# Patient Record
Sex: FEMALE | Race: WHITE | Employment: FULL TIME | URBAN - METROPOLITAN AREA
[De-identification: names, ages, dates, MRNs, and addresses within clinical notes are randomized per-mention and may not be internally consistent; named-entity substitution may affect disease eponyms.]

---

## 2020-08-19 ENCOUNTER — EMPLOYEE WELLNESS (OUTPATIENT)
Dept: OTHER | Age: 20
End: 2020-08-19

## 2020-08-19 LAB
CHOLESTEROL, TOTAL: 207 MG/DL (ref 0–199)
GLUCOSE BLD-MCNC: 70 MG/DL (ref 70–99)
HDLC SERPL-MCNC: 55 MG/DL (ref 40–60)
LDL CHOLESTEROL CALCULATED: 128 MG/DL
TRIGL SERPL-MCNC: 119 MG/DL (ref 0–150)

## 2020-10-19 VITALS — WEIGHT: 169 LBS

## 2022-01-04 ENCOUNTER — OFFICE VISIT (OUTPATIENT)
Dept: OBGYN CLINIC | Age: 22
End: 2022-01-04
Payer: COMMERCIAL

## 2022-01-04 VITALS
DIASTOLIC BLOOD PRESSURE: 70 MMHG | TEMPERATURE: 97.8 F | HEART RATE: 87 BPM | SYSTOLIC BLOOD PRESSURE: 110 MMHG | WEIGHT: 159.4 LBS

## 2022-01-04 DIAGNOSIS — B37.31 YEAST VAGINITIS: ICD-10-CM

## 2022-01-04 DIAGNOSIS — N91.2 AMENORRHEA: Primary | ICD-10-CM

## 2022-01-04 DIAGNOSIS — Z11.3 SCREENING EXAMINATION FOR STD (SEXUALLY TRANSMITTED DISEASE): ICD-10-CM

## 2022-01-04 DIAGNOSIS — Z32.01 POSITIVE PREGNANCY TEST: ICD-10-CM

## 2022-01-04 DIAGNOSIS — N91.2 AMENORRHEA: ICD-10-CM

## 2022-01-04 DIAGNOSIS — Z12.4 PAP SMEAR FOR CERVICAL CANCER SCREENING: ICD-10-CM

## 2022-01-04 DIAGNOSIS — Z01.419 WOMEN'S ANNUAL ROUTINE GYNECOLOGICAL EXAMINATION: Primary | ICD-10-CM

## 2022-01-04 LAB
CRL: NORMAL
SAC DIAMETER: NORMAL

## 2022-01-04 PROCEDURE — 76801 OB US < 14 WKS SINGLE FETUS: CPT | Performed by: OBSTETRICS & GYNECOLOGY

## 2022-01-04 PROCEDURE — 81025 URINE PREGNANCY TEST: CPT | Performed by: OBSTETRICS & GYNECOLOGY

## 2022-01-04 PROCEDURE — 99385 PREV VISIT NEW AGE 18-39: CPT | Performed by: OBSTETRICS & GYNECOLOGY

## 2022-01-04 RX ORDER — ONDANSETRON 4 MG/1
4 TABLET, ORALLY DISINTEGRATING ORAL EVERY 8 HOURS PRN
Qty: 20 TABLET | Refills: 1 | Status: ON HOLD
Start: 2022-01-04 | End: 2022-07-22 | Stop reason: HOSPADM

## 2022-01-04 RX ORDER — PRASTERONE (DHEA) 50 MG
CAPSULE ORAL
COMMUNITY
End: 2022-08-04

## 2022-01-04 RX ORDER — MULTIVIT-MIN/IRON/FOLIC ACID/K 18-600-40
CAPSULE ORAL
COMMUNITY
End: 2022-08-04

## 2022-01-04 RX ORDER — CHLORAL HYDRATE 500 MG
3000 CAPSULE ORAL 3 TIMES DAILY
Status: ON HOLD | COMMUNITY
End: 2022-07-22 | Stop reason: HOSPADM

## 2022-01-04 NOTE — PROGRESS NOTES
Patient called in to check if it is okay to get her booster for COVID and per Dr. Eugenia Garcia it is okay. Patient had COVID in Nov, 2021 and is almost in her 2nd trimester.

## 2022-01-05 LAB
BACTERIA: ABNORMAL /HPF
BILIRUBIN URINE: NEGATIVE
BLOOD, URINE: NEGATIVE
C TRACH DNA GENITAL QL NAA+PROBE: NEGATIVE
CANDIDA SPECIES, DNA PROBE: NORMAL
CLARITY: ABNORMAL
COLOR: YELLOW
COMMENT UA: ABNORMAL
EPITHELIAL CELLS, UA: 5 /HPF (ref 0–5)
GARDNERELLA VAGINALIS, DNA PROBE: NORMAL
GLUCOSE URINE: NEGATIVE MG/DL
HYALINE CASTS: 0 /LPF (ref 0–8)
KETONES, URINE: NEGATIVE MG/DL
LEUKOCYTE ESTERASE, URINE: NEGATIVE
MICROSCOPIC EXAMINATION: YES
N. GONORRHOEAE DNA: NEGATIVE
NITRITE, URINE: NEGATIVE
PH UA: 7.5 (ref 5–8)
PROTEIN UA: NEGATIVE MG/DL
RBC UA: ABNORMAL /HPF (ref 0–4)
SPECIFIC GRAVITY UA: 1.01 (ref 1–1.03)
TRICHOMONAS VAGINALIS DNA: NORMAL
URINE TYPE: ABNORMAL
UROBILINOGEN, URINE: 0.2 E.U./DL
WBC UA: 5 /HPF (ref 0–5)

## 2022-01-06 LAB — URINE CULTURE, ROUTINE: NORMAL

## 2022-01-06 NOTE — PROGRESS NOTES
Subjective:      Patient ID: Parvin Spence is a 19 y.o. female. HPI  20 y/o  female presents for well woman examination and evaluation secondary to amenorrhea/missed menses. No history of pap smear. Menarche occurred in 8th grade. Menses occur every month x 5 days, medium flow, painful in the beginning. FDLMP: 10/11/21. Denies vaginal bleeding and discharge since October. History is positive for small left ovarian cyst--diagnosed by prior provider. Has noted nausea and vomiting--resolved 1 week ago. Has required Zofran. Review of Systems   Constitutional: Negative for activity change, appetite change, chills, fatigue, fever and unexpected weight change. Respiratory: Negative for shortness of breath. Cardiovascular: Negative for chest pain. Gastrointestinal: Negative for abdominal distention, abdominal pain, constipation, diarrhea, nausea and vomiting. Endocrine: Negative for cold intolerance and heat intolerance. Genitourinary: Positive for menstrual problem and vaginal discharge. Negative for difficulty urinating, dyspareunia, dysuria, frequency, genital sores, hematuria, pelvic pain, vaginal bleeding and vaginal pain. Skin: Negative for rash. Neurological: Negative for headaches. Hematological: Does not bruise/bleed easily. Objective:   Physical Exam  Vitals and nursing note reviewed. Exam conducted with a chaperone present. Constitutional:       General: She is not in acute distress. Appearance: Normal appearance. She is well-developed. She is not ill-appearing or toxic-appearing. HENT:      Head: Normocephalic and atraumatic. Neck:      Thyroid: No thyromegaly. Trachea: Trachea normal.   Cardiovascular:      Rate and Rhythm: Normal rate and regular rhythm. Heart sounds: Normal heart sounds, S1 normal and S2 normal.   Pulmonary:      Effort: Pulmonary effort is normal. No respiratory distress. Breath sounds: Normal breath sounds.    Chest: x 1.86 cm. There is a single intrauterine pregnancy identified. A fetal pole is noted with a CRL measuring 5.85 cm, consistent with gestational age of 16 weeks and 2 days and EDC of 07/17/2022. There is a 1 day discrepancy when compared with the gestational age of 16 weeks and 1 day and ED[facility] 07[facility]). Yolk sac is absent. Fetal cardiac activity is present at 158 bpm.     IMPRESSION:    Single IUP with cardiac activity. Imaging is limited secondary to bowel gas. Patient is well aware of the limitations of ultrasound in the detection of anomalies. Assessment:       Diagnosis Orders   1. Women's annual routine gynecological examination  URINALYSIS WITH MICROSCOPIC    Culture, Urine    PAP SMEAR    VAGINAL PATHOGENS PROBE *A    C.trachomatis N.gonorrhoeae DNA   2. Pap smear for cervical cancer screening  PAP SMEAR   3. Positive pregnancy test  URINALYSIS WITH MICROSCOPIC    Culture, Urine    POCT urine pregnancy   4. Amenorrhea  URINALYSIS WITH MICROSCOPIC    Culture, Urine    VAGINAL PATHOGENS PROBE *A    C.trachomatis N.gonorrhoeae DNA   5. Yeast vaginitis  VAGINAL PATHOGENS PROBE *A   6. Screening examination for STD (sexually transmitted disease)  VAGINAL PATHOGENS PROBE *A    C.trachomatis N.gonorrhoeae DNA           Plan:      Orders Placed This Encounter   Procedures    Culture, Urine    C.trachomatis N.gonorrhoeae DNA    URINALYSIS WITH MICROSCOPIC    PAP SMEAR    VAGINAL PATHOGENS PROBE *A    PAP SMEAR    POCT urine pregnancy     Continue prenatal vitamin with folic acid. Ultrasound result reviewed. Options for treatment reviewed: NT, quad screen, free fetal DNA. Risks and benefits discussed. Follow up prn and 4 weeks for initial prenatal visit.    Rx terazole  Rx refill faviola Quintero DO

## 2022-01-09 ASSESSMENT — ENCOUNTER SYMPTOMS
DIARRHEA: 0
VOMITING: 0
ABDOMINAL PAIN: 0
NAUSEA: 0
SHORTNESS OF BREATH: 0
ABDOMINAL DISTENTION: 0
CONSTIPATION: 0

## 2022-01-27 ENCOUNTER — TELEPHONE (OUTPATIENT)
Dept: OBGYN CLINIC | Age: 22
End: 2022-01-27

## 2022-01-27 RX ORDER — ONDANSETRON 4 MG/1
4 TABLET, FILM COATED ORAL EVERY 8 HOURS PRN
Qty: 20 TABLET | Refills: 1 | Status: ON HOLD
Start: 2022-01-27 | End: 2022-07-22 | Stop reason: HOSPADM

## 2022-01-27 NOTE — TELEPHONE ENCOUNTER
Patient called and stated since she has started the zofran she is dealing with headaches, denies any blurred vision, or swelling. Is asking if there is another option besides the dissolvable tablet. stated the one she took before was a tablet she swallowed, and didn't seem to give her headaches. Stated she has used tylenol for the headaches and it does seem to help when they come on. Please advise.      Routing to Dr. Sofia Land

## 2022-01-31 ENCOUNTER — INITIAL PRENATAL (OUTPATIENT)
Dept: OBGYN CLINIC | Age: 22
End: 2022-01-31
Payer: COMMERCIAL

## 2022-01-31 VITALS — DIASTOLIC BLOOD PRESSURE: 60 MMHG | HEART RATE: 76 BPM | WEIGHT: 160 LBS | SYSTOLIC BLOOD PRESSURE: 100 MMHG

## 2022-01-31 DIAGNOSIS — O21.9 NAUSEA AND VOMITING IN PREGNANCY: ICD-10-CM

## 2022-01-31 DIAGNOSIS — Z34.02 PRIMIGRAVIDA IN SECOND TRIMESTER: Primary | ICD-10-CM

## 2022-01-31 LAB
AMPHETAMINE SCREEN, URINE: NORMAL
BARBITURATE SCREEN URINE: NORMAL
BASOPHILS ABSOLUTE: 0 K/UL (ref 0–0.2)
BASOPHILS RELATIVE PERCENT: 0.5 %
BENZODIAZEPINE SCREEN, URINE: NORMAL
BUPRENORPHINE URINE: NORMAL
CANNABINOID SCREEN URINE: NORMAL
COCAINE METABOLITE SCREEN URINE: NORMAL
EOSINOPHILS ABSOLUTE: 0.1 K/UL (ref 0–0.6)
EOSINOPHILS RELATIVE PERCENT: 0.8 %
HCT VFR BLD CALC: 36.5 % (ref 36–48)
HEMOGLOBIN: 12.4 G/DL (ref 12–16)
HEPATITIS B SURFACE ANTIGEN INTERPRETATION: NORMAL
LYMPHOCYTES ABSOLUTE: 1.6 K/UL (ref 1–5.1)
LYMPHOCYTES RELATIVE PERCENT: 22.8 %
Lab: NORMAL
MCH RBC QN AUTO: 32.5 PG (ref 26–34)
MCHC RBC AUTO-ENTMCNC: 34.1 G/DL (ref 31–36)
MCV RBC AUTO: 95.3 FL (ref 80–100)
METHADONE SCREEN, URINE: NORMAL
MONOCYTES ABSOLUTE: 0.5 K/UL (ref 0–1.3)
MONOCYTES RELATIVE PERCENT: 7.4 %
NEUTROPHILS ABSOLUTE: 4.9 K/UL (ref 1.7–7.7)
NEUTROPHILS RELATIVE PERCENT: 68.5 %
OPIATE SCREEN URINE: NORMAL
OXYCODONE URINE: NORMAL
PDW BLD-RTO: 13.9 % (ref 12.4–15.4)
PH UA: 6
PHENCYCLIDINE SCREEN URINE: NORMAL
PLATELET # BLD: 251 K/UL (ref 135–450)
PMV BLD AUTO: 8.7 FL (ref 5–10.5)
PROPOXYPHENE SCREEN: NORMAL
RBC # BLD: 3.83 M/UL (ref 4–5.2)
RUBELLA ANTIBODY IGG: 361 IU/ML
TSH REFLEX: 2.1 UIU/ML (ref 0.27–4.2)
WBC # BLD: 7.2 K/UL (ref 4–11)

## 2022-01-31 PROCEDURE — 36415 COLL VENOUS BLD VENIPUNCTURE: CPT | Performed by: OBSTETRICS & GYNECOLOGY

## 2022-01-31 PROCEDURE — 0500F INITIAL PRENATAL CARE VISIT: CPT | Performed by: OBSTETRICS & GYNECOLOGY

## 2022-01-31 NOTE — PROGRESS NOTES
Temp: 97.4  Maternal emotional well being screening form completed and reviewed with patient. Current score is 0. Patient given referral to [address] [place] [address] ([phone]): No    Patient presents to office for urine drug screen. Patient escorted to exam room, personal belongings left if room and door closed. Excorted patient to restroom. Clean catch urine collection explained. Patient notified to not flush toilet or wash hands and bring specimen out of restroom. Patient initialed ID labels and witnessed application of labels to specimen. Urine temperature read: 94.0 degree F. Patient permitted to return to restroom to flush toilet and wash hands. Escorted patient back to exam room. Kassie Rowe MA    Blood draw from L Cephalic x 1 attempt without difficulty. 3 SST, 1 PST, 2 LAV tubes drawn. Patient tolerated well.  Kassie Rowe MA

## 2022-02-01 LAB
ABO/RH: NORMAL
ANTIBODY SCREEN: NORMAL
HIV AG/AB: NORMAL
HIV ANTIGEN: NORMAL
HIV-1 ANTIBODY: NORMAL
HIV-2 AB: NORMAL
RPR: NORMAL
VARICELLA-ZOSTER VIRUS AB, IGG: NORMAL

## 2022-02-02 LAB
AFP INTERPRETATION: NORMAL
AFP MOM: 0.58
AFP SPECIMEN: NORMAL
D-INHIBIN: 86 PG/ML
DATING: NORMAL
EER MATERNAL SCREEN AFP, HCG, EST, INH: NORMAL
ESTIMATED DUE DATE: NORMAL
FETUS COUNT: NORMAL
GESTATIONAL AGE CALC AT COLLECT: NORMAL
HISTORY OF ANEUPLOIDY?: NO
HISTORY/NEURAL TUBE DEFECTS: NO
INSULIN DEP. DIABETIC: NO
MATERNAL AGE AT EDD: 24.2 YR
MATERNAL WEIGHT: NORMAL
MOM FOR HCG, 2ND TRIMESTER: 0.61
MOM FOR UE3: 0.73
MOM INHIBN: 0.58
PATIENT'S HCG, 2ND TRIMESTER: NORMAL IU/L
PT AFP: 19 NG/ML
PT UE3: 0.68 NG/ML
RACE: NORMAL
SMOKING: NO

## 2022-02-20 PROBLEM — Z34.02 PRIMIGRAVIDA IN SECOND TRIMESTER: Status: ACTIVE | Noted: 2022-02-20

## 2022-02-20 NOTE — PROGRESS NOTES
20 y/o  female at 16 weeks 0 days gestation with [facility] 22 presents for initial prenatal visit. FDLMP: 10/11/21  Pregnancy is complicated by nausea and vomiting. Denies vaginal bleeding, loss of fluid and pelvic pain. Denies headache, vision changes and RUQ pain. Admits to nausea, denies vomiting x 2 weeks. Denies fever, chills, chest pain, shortness of breath, diarrhea, constipation, dysuria and hematuria. FDLMP: 10/11/21. Denies vaginal bleeding and discharge since October. History is positive for small left ovarian cyst--diagnosed by prior provider. Has noted nausea and vomiting--resolved 1 week ago. Has required Zofran. Diagnosis Orders   1. Primigravida in second trimester  Type and Screen    CBC Auto Differential    Rubella antibody, IgG    Hepatitis B Surface Antigen    TSH with Reflex    HIV Screen    Varicella Zoster Antibody, IgG    Maternal screen 4   2. Nausea and vomiting in pregnancy       Orders Placed This Encounter   Procedures    CBC Auto Differential    Rubella antibody, IgG    Hepatitis B Surface Antigen    TSH with Reflex    HIV Screen    Varicella Zoster Antibody, IgG    Maternal screen 4    RPR Reflex to Titer and TPPA    Drug Screen Multi Urine With Bup    Type and Screen     Follow up prn and 2-3 weeks for prenatal visit and ultrasound.

## 2022-02-28 ENCOUNTER — ROUTINE PRENATAL (OUTPATIENT)
Dept: OBGYN CLINIC | Age: 22
End: 2022-02-28

## 2022-02-28 ENCOUNTER — OFFICE VISIT (OUTPATIENT)
Dept: OBGYN CLINIC | Age: 22
End: 2022-02-28
Payer: COMMERCIAL

## 2022-02-28 VITALS — DIASTOLIC BLOOD PRESSURE: 60 MMHG | HEART RATE: 81 BPM | WEIGHT: 165.2 LBS | SYSTOLIC BLOOD PRESSURE: 94 MMHG

## 2022-02-28 DIAGNOSIS — Z34.02 PRIMIGRAVIDA IN SECOND TRIMESTER: Primary | ICD-10-CM

## 2022-02-28 PROCEDURE — 0502F SUBSEQUENT PRENATAL CARE: CPT | Performed by: OBSTETRICS & GYNECOLOGY

## 2022-02-28 PROCEDURE — 76805 OB US >/= 14 WKS SNGL FETUS: CPT | Performed by: OBSTETRICS & GYNECOLOGY

## 2022-03-01 LAB
ABDOMINAL CIRCUMFERENCE: NORMAL
BIPARIETAL DIAMETER: NORMAL
ESTIMATED FETAL WEIGHT: NORMAL
FEMORAL DIAMETER: NORMAL
HC/AC: NORMAL
HEAD CIRCUMFERENCE: NORMAL

## 2022-03-05 NOTE — PROGRESS NOTES
18 y/o  female at 20 weeks 0 days gestation with [facility] 22 presents for prenatal visit and ultrasound   FDLMP: 10/11/21  Pregnancy has been complicated by nausea and vomiting. Denies vaginal bleeding, loss of fluid and pelvic pain. Denies vaginal bleeding since the first trimester. Denies headache, vision changes and RUQ pain. Admits to some diarrhea. Denies fever, chills, chest pain, shortness of breath, nausea, vomiting, constipation, dysuria and hematuria. Maternal Wellness Questionnaire reviewed--no concerns. OBSTETRIC ULTRASOUND -- SECOND TRIMESTER       DATE:  2022       PHYSICIAN: LAURA Richardson       SONOGRAPHER: Bakari Mayberry RDMS       INDICATION:  Second trimester, Anatomy screening       TYPE OF SCAN: abdominal vaginal 3.5 MHz 5MHz       FINDINGS:         A single viable intrauterine pregnancy is noted in cephalic presentation. Cardiac and somatic activity are noted.       The following values were obtained:   Fetal heart rate 149 bpm   BPD 4.79cm 70.1 %   Head Circumference 18.44cm 77.6 %    Abdominal Circumference 15.98cm 78.8 %   Femur Length 3.64cm 89.1 %   Humerus Length 3.29cm 88.3 %   Cerebellum 2.09cm 72.6 %   Amniotic fluid DVP 6.07cm   EFW 408g 96.1 percentile       Subjective amniotic fluid volume is normal. Based on sonographic criteria, the estimated fetal age is 20 weeks and 6 days with EDC of 2022. Ihsan Fuelling is a 6 day discordance with the established EDC of 2022. The patient has an anterior placenta that is adequate distance in relation to the internal cervical os. The evaluation of the lower uterine segment and cervix reveals normal appearing anatomy. Transvaginal cervical length is 5.29cm with no funneling noted. The uterus is unremarkable/gravid.  Maternal ovaries and adnexae are not well visualized due to the size of the uterus and patient's gravid state.       Normal Anatomy Seen:   Spine CSP   LVOT Kidneys Lateral ventricles   RVOT Umbilical arteries Cerebellum   Ductal arch Bladder Cisterna magna   Aortic arch Nuchal fold   Diaphragm Upper extremities   Stomach Lower extremities   ACI PCI Choroid plexus       Suboptimal anatomy seen: 4 chamber heart, face, nose/lips, profile.       Abnormal anatomy seen:       The fetal genitalia is noted to be Male.        IMPRESSION:   Single living IUP. No gross structural abnormalities are visualized. Amniotic fluid volume is subjectively normal.       The patient is well aware of the limitations of ultrasound in the detection of fetal anomalies.  The scan is limited by fetal position.        Diagnosis Orders   1. Primigravida in second trimester       Repeat ultrasound in 4 weeks--growth, 4 chamber heart, face, nose/lips, profile.   Follow up prn and 4 weeks for prenatal visit and ultrasound

## 2022-03-25 ENCOUNTER — OFFICE VISIT (OUTPATIENT)
Dept: OBGYN CLINIC | Age: 22
End: 2022-03-25
Payer: COMMERCIAL

## 2022-03-25 ENCOUNTER — ROUTINE PRENATAL (OUTPATIENT)
Dept: OBGYN CLINIC | Age: 22
End: 2022-03-25

## 2022-03-25 VITALS — WEIGHT: 165.2 LBS | DIASTOLIC BLOOD PRESSURE: 62 MMHG | SYSTOLIC BLOOD PRESSURE: 110 MMHG | HEART RATE: 83 BPM

## 2022-03-25 DIAGNOSIS — Z34.02 PRIMIGRAVIDA IN SECOND TRIMESTER: Primary | ICD-10-CM

## 2022-03-25 PROCEDURE — 0502F SUBSEQUENT PRENATAL CARE: CPT | Performed by: OBSTETRICS & GYNECOLOGY

## 2022-03-25 PROCEDURE — 76816 OB US FOLLOW-UP PER FETUS: CPT | Performed by: OBSTETRICS & GYNECOLOGY

## 2022-03-25 NOTE — PROGRESS NOTES
Temp: 97.8  Maternal emotional well being screening form completed and reviewed with patient. Current score is 1. Patient given referral to [address] [place] [address] ([phone]):  No

## 2022-03-29 NOTE — PROGRESS NOTES
18 y/o  female at 23 weeks 4 days gestation with [facility] 22 presents for prenatal visit and ultrasound   FDLMP: 10/11/21  Pregnancy has been complicated by nausea and vomiting. Denies vaginal bleeding, loss of fluid and pelvic pain. Denies vaginal bleeding since the first trimester. Denies headache, vision changes and RUQ pain. Denies fever, chills, chest pain, shortness of breath, nausea, vomiting, diarrhea, constipation, dysuria and hematuria. Maternal Wellness Questionnaire reviewed--no concerns. OBSTETRIC ULTRASOUND GROWTH    DATE: 3/25/22    PHYSICIAN: LAURA Bunn D.O.     SONOGRAPHER: Vernon Mcfadden RDMS    INDICATION: Growth, anatomy follow up    TYPE OF SCAN: abdominal    FINDINGS:  A single viable intrauterine pregnancy is noted in cephalic presentation. Cardiac and somatic activity are noted. The following values were obtained:   Fetal heart rate    138 bpm   BPD      6.13cm  87.5 %   Head Circumference    22.88cm  82.3 %    Abdominal Circumference   19.76cm  68.8 %   Femur Length     4.59cm  86.0 %   Amniotic fluid DVP    6.33cm   EFW      735g  90.4 percentile    Amniotic fluid volume is normal. Based on sonographic criteria the estimated fetal age is 24 weeks and 6 days with EDC of 22. There is a 9 day discordance with the established EDC of 22. The patient has an anterior placenta that is adequate distance in relation to the internal cervical os. The evaluation of the lower uterine segment and cervix reveals normal appearing anatomy. The uterus is unremarkable/gravid. Maternal ovaries and adnexae are not well visualized due to the size of the uterus and patient's gravid state. Anatomy seen includes: 4 chamber heart, face, nose/lips, profile, stomach, kidneys, bladder    IMPRESSION:  Single live IUP in the second trimester. Adequate interval fetal growth. Imaging is limited secondary to fetal position.   The patient is well aware of the limitations of ultrasound in the detection of anomalies. Diagnosis Orders   1. Primigravida in second trimester       Ultrasound result reviewed--reassurance. EFW: 90%. Repeat ultrasound for growth at 32 weeks gestation  Follow up prn and 4 weeks for prenatal visit, 1 hour GTT, CBC and tdap.

## 2022-04-20 ENCOUNTER — OFFICE VISIT (OUTPATIENT)
Dept: FAMILY MEDICINE CLINIC | Age: 22
End: 2022-04-20
Payer: COMMERCIAL

## 2022-04-20 VITALS
RESPIRATION RATE: 16 BRPM | DIASTOLIC BLOOD PRESSURE: 72 MMHG | HEART RATE: 93 BPM | SYSTOLIC BLOOD PRESSURE: 108 MMHG | BODY MASS INDEX: 25.91 KG/M2 | WEIGHT: 171 LBS | HEIGHT: 68 IN | OXYGEN SATURATION: 99 %

## 2022-04-20 DIAGNOSIS — L70.8 OTHER ACNE: ICD-10-CM

## 2022-04-20 DIAGNOSIS — Z83.79 FAMILY HISTORY OF ULCERATIVE COLITIS: ICD-10-CM

## 2022-04-20 DIAGNOSIS — Z00.00 WELL ADULT HEALTH CHECK: Primary | ICD-10-CM

## 2022-04-20 DIAGNOSIS — F41.9 ANXIETY: ICD-10-CM

## 2022-04-20 PROCEDURE — 99385 PREV VISIT NEW AGE 18-39: CPT | Performed by: FAMILY MEDICINE

## 2022-04-20 RX ORDER — CLINDAMYCIN PHOSPHATE 10 MG/G
GEL TOPICAL
Qty: 1 EACH | Refills: 5 | Status: SHIPPED | OUTPATIENT
Start: 2022-04-20 | End: 2022-04-27

## 2022-04-20 ASSESSMENT — PATIENT HEALTH QUESTIONNAIRE - PHQ9
6. FEELING BAD ABOUT YOURSELF - OR THAT YOU ARE A FAILURE OR HAVE LET YOURSELF OR YOUR FAMILY DOWN: 0
10. IF YOU CHECKED OFF ANY PROBLEMS, HOW DIFFICULT HAVE THESE PROBLEMS MADE IT FOR YOU TO DO YOUR WORK, TAKE CARE OF THINGS AT HOME, OR GET ALONG WITH OTHER PEOPLE: 0
SUM OF ALL RESPONSES TO PHQ QUESTIONS 1-9: 0
5. POOR APPETITE OR OVEREATING: 0
9. THOUGHTS THAT YOU WOULD BE BETTER OFF DEAD, OR OF HURTING YOURSELF: 0
SUM OF ALL RESPONSES TO PHQ9 QUESTIONS 1 & 2: 0
4. FEELING TIRED OR HAVING LITTLE ENERGY: 0
7. TROUBLE CONCENTRATING ON THINGS, SUCH AS READING THE NEWSPAPER OR WATCHING TELEVISION: 0
SUM OF ALL RESPONSES TO PHQ QUESTIONS 1-9: 0
SUM OF ALL RESPONSES TO PHQ QUESTIONS 1-9: 0
1. LITTLE INTEREST OR PLEASURE IN DOING THINGS: 0
3. TROUBLE FALLING OR STAYING ASLEEP: 0
SUM OF ALL RESPONSES TO PHQ QUESTIONS 1-9: 0
2. FEELING DOWN, DEPRESSED OR HOPELESS: 0
8. MOVING OR SPEAKING SO SLOWLY THAT OTHER PEOPLE COULD HAVE NOTICED. OR THE OPPOSITE, BEING SO FIGETY OR RESTLESS THAT YOU HAVE BEEN MOVING AROUND A LOT MORE THAN USUAL: 0

## 2022-04-20 NOTE — PATIENT INSTRUCTIONS
Jaime Grewal (and associates)  [address]  [address] [facility] Aileen Bolaños, [address]  Phone: [phone] [address]  [address] [facility]  [address]  [place], [address]  [phone]    Database for Acceptance and Commitment Therapy for CBT  [email or web address]  Then search your area for ACT therapist.    If having issues finding one that is covered just message me and we will set you up with [facility]

## 2022-04-20 NOTE — PROGRESS NOTES
4/20/2022    Carloz Jane is a 19 y.o. female here to establish care with me. HPI    Originally from the [facility] area    32 weeks pregnant with a boy  Works from home for [employer] works for [employer]. They both work from home    [facility] [employer] [facility]    Family hx of  in [facility]    Mental health  - has a counselor she has been seeing in [facility], now no longer in network for her insurance. Mostly to help with maintenance therapy for anxiety-related issues. Review of Systems    Prior to Visit Medications    Medication Sig Taking? Authorizing Provider   clindamycin (CLEOCIN-T) 1 % gel Apply topically 2 times daily. Yes Konstantin Lofton MD   Loratadine (CLARITIN PO) Take by mouth Yes Historical Provider, MD   ondansetron (ZOFRAN) 4 MG tablet Take 1 tablet by mouth every 8 hours as needed for Nausea Yes Jos Bunn DO   [id number] PO Take by mouth Yes Historical Provider, MD   Omega-3 Fatty Acids (FISH OIL) 1000 MG CAPS Take 3,000 mg by mouth 3 times daily Yes Historical Provider, MD   DHEA 50 MG CAPS Take by mouth Yes Historical Provider, MD   Cholecalciferol (VITAMIN D) 50 MCG (2000 UT) CAPS capsule Take by mouth Yes Historical Provider, MD   ondansetron (ZOFRAN-ODT) 4 MG disintegrating tablet Take 1 tablet by mouth every 8 hours as needed for Nausea Yes Gloria Ma DO     Past Medical History:   Diagnosis Date    Psychiatric problem      No past surgical history on file.   Family History   Problem Relation Age of Onset    Diabetes Paternal Grandmother    [de-identified] Abortions Mother     Spont Abortions Sister      Social History     Socioeconomic History    Marital status: Single     Spouse name: None    Number of children: None    Years of education: None    Highest education level: None   Occupational History    None   Tobacco Use    Smoking status: Never Smoker    Smokeless tobacco: Never Used   Vaping Use    Vaping Use: Never used   Substance and Sexual Activity    Alcohol use: Not Currently     Comment: socially    Drug use: Never    Sexual activity: None   Other Topics Concern    None   Social History Narrative    None     Social Determinants of Health     Financial Resource Strain:     Difficulty of Paying Living Expenses: Not on file   Food Insecurity:     Worried About Running Out of Food in the Last Year: Not on file    Mary of Food in the Last Year: Not on file   Transportation Needs:     Lack of Transportation (Medical): Not on file    Lack of Transportation (Non-Medical):  Not on file   Physical Activity:     Days of Exercise per Week: Not on file    Minutes of Exercise per Session: Not on file   Stress:     Feeling of Stress : Not on file   Social Connections:     Frequency of Communication with Friends and Family: Not on file    Frequency of Social Gatherings with Friends and Family: Not on file    Attends Jainism Services: Not on file    Active Member of [address] [place] [address] [employer] or Organizations: Not on file    Attends Club or Organization Meetings: Not on file    Marital Status: Not on file   Intimate Partner Violence:     Fear of Current or Ex-Partner: Not on file    Emotionally Abused: Not on file    Physically Abused: Not on file    Sexually Abused: Not on file   Housing Stability:     Unable to Pay for Housing in the Last Year: Not on file    Number of [place] in the Last Year: Not on file    Unstable Housing in the Last Year: Not on file     Allergies   Allergen Reactions    Seasonal        LABS:  Lab Results   Component Value Date    GLUCOSE 70 08/19/2020     No results found for: NA, K, CREATININE, GLU  Cholesterol, Total   Date Value Ref Range Status   08/19/2020 207 (H) 0 - 199 mg/dL Final     LDL Calculated   Date Value Ref Range Status   08/19/2020 128 (H) <100 mg/dL Final     HDL   Date Value Ref Range Status   08/19/2020 55 40 - 60 mg/dL Final     Triglycerides   Date Value Ref Range Status   08/19/2020 119 0 - 150 mg/dL Final     No results found for: ALT, AST, GGT, ALKPHOS, BILITOT   Lab Results   Component Value Date    WBC 7.2 01/31/2022    HGB 12.4 01/31/2022    HCT 36.5 01/31/2022    MCV 95.3 01/31/2022     01/31/2022     PHYSICAL EXAM  /72 (Site: Right Upper Arm, Position: Sitting, Cuff Size: Large Adult)   Pulse 93   Resp 16   Ht 5' 7.5\" (1.715 m)   Wt 171 lb (77.6 kg)   LMP 10/11/2021 (Approximate)   SpO2 99%   BMI 26.39 kg/m²     BP Readings from Last 3 Encounters:   04/20/22 108/72   03/25/22 110/62   02/28/22 94/60     Wt Readings from Last 3 Encounters:   04/20/22 171 lb (77.6 kg)   03/25/22 165 lb 3.2 oz (74.9 kg)   02/28/22 165 lb 3.2 oz (74.9 kg)      Physical Exam  Constitutional:       Appearance: She is well-developed. HENT:      Head: Normocephalic and atraumatic. Right Ear: Tympanic membrane normal.      Ears:      Comments: L TM with impacted cerumen, removed by irrigation  Eyes:      Conjunctiva/sclera: Conjunctivae normal.   Neck:      Thyroid: No thyromegaly. Cardiovascular:      Rate and Rhythm: Normal rate and regular rhythm. Heart sounds: Normal heart sounds. No murmur heard. Pulmonary:      Effort: Pulmonary effort is normal.      Breath sounds: Normal breath sounds. No wheezing. Abdominal:      Comments: Gravid uterus   Musculoskeletal:         General: Normal range of motion. Cervical back: Normal range of motion and neck supple. Lymphadenopathy:      Cervical: No cervical adenopathy. Skin:     General: Skin is warm and dry. Findings: No rash. Comments: Normal turgor   Neurological:      Mental Status: She is alert and oriented to person, place, and time. Deep Tendon Reflexes: Reflexes normal.   Psychiatric:         Thought Content: Thought content normal.         ASSESSMENT/PLAN:  1. Well adult health check    2. Family history of ulcerative colitis - father    3. Anxiety    4. Other acne  - clindamycin (CLEOCIN-T) 1 % gel;  Apply topically 2 times daily. Dispense: 1 each; Refill: 5  - Juany Duran MD, Dermatology, [facility]    Discussed topical tx for acne during pregnancy - can use clinda sparingly (now in 3rd trimester). Referral to [address] [place] [address] for counselors for anxiety.  Can see our [facility] [place] here if having any issues with finding one near her or insurance issues    F/u annually

## 2022-04-22 ENCOUNTER — ROUTINE PRENATAL (OUTPATIENT)
Dept: OBGYN CLINIC | Age: 22
End: 2022-04-22
Payer: COMMERCIAL

## 2022-04-22 VITALS
HEART RATE: 80 BPM | SYSTOLIC BLOOD PRESSURE: 110 MMHG | BODY MASS INDEX: 26.85 KG/M2 | DIASTOLIC BLOOD PRESSURE: 68 MMHG | WEIGHT: 174 LBS

## 2022-04-22 DIAGNOSIS — Z34.02 PRIMIGRAVIDA IN SECOND TRIMESTER: Primary | ICD-10-CM

## 2022-04-22 LAB
BASOPHILS ABSOLUTE: 0 K/UL (ref 0–0.2)
BASOPHILS RELATIVE PERCENT: 0.3 %
EOSINOPHILS ABSOLUTE: 0.1 K/UL (ref 0–0.6)
EOSINOPHILS RELATIVE PERCENT: 0.6 %
GLUCOSE CHALLENGE: 84 MG/DL
HCT VFR BLD CALC: 36.1 % (ref 36–48)
HEMOGLOBIN: 12.4 G/DL (ref 12–16)
LYMPHOCYTES ABSOLUTE: 1.6 K/UL (ref 1–5.1)
LYMPHOCYTES RELATIVE PERCENT: 17.7 %
MCH RBC QN AUTO: 33.2 PG (ref 26–34)
MCHC RBC AUTO-ENTMCNC: 34.3 G/DL (ref 31–36)
MCV RBC AUTO: 96.8 FL (ref 80–100)
MONOCYTES ABSOLUTE: 0.6 K/UL (ref 0–1.3)
MONOCYTES RELATIVE PERCENT: 6.4 %
NEUTROPHILS ABSOLUTE: 7 K/UL (ref 1.7–7.7)
NEUTROPHILS RELATIVE PERCENT: 75 %
PDW BLD-RTO: 12.9 % (ref 12.4–15.4)
PLATELET # BLD: 252 K/UL (ref 135–450)
PMV BLD AUTO: 8.6 FL (ref 5–10.5)
RBC # BLD: 3.73 M/UL (ref 4–5.2)
WBC # BLD: 9.3 K/UL (ref 4–11)

## 2022-04-22 PROCEDURE — 90715 TDAP VACCINE 7 YRS/> IM: CPT | Performed by: OBSTETRICS & GYNECOLOGY

## 2022-04-22 PROCEDURE — 36415 COLL VENOUS BLD VENIPUNCTURE: CPT | Performed by: OBSTETRICS & GYNECOLOGY

## 2022-04-22 PROCEDURE — 90471 IMMUNIZATION ADMIN: CPT | Performed by: OBSTETRICS & GYNECOLOGY

## 2022-04-22 PROCEDURE — 0502F SUBSEQUENT PRENATAL CARE: CPT | Performed by: OBSTETRICS & GYNECOLOGY

## 2022-04-22 NOTE — PROGRESS NOTES
Temp: 96.8  Maternal emotional well being screening form completed and reviewed with patient. Current score is 0. Patient given referral to [address] [place] [address] ([phone]): No    glucola 50G given at 10:53am, completed at 10:56am.    Blood draw from L Cephalic x 1 attempt without difficulty. 1 SST, 1 LAV tubes drawn. Patient tolerated well. Kayy Day MA    12:13 PM Given Tdap (Adacel) vaccine 0.5mL IM  Site:Left deltoid. Lot #[id number]  Expiration Date: 03/18/2024  . [address] #[id number]. Patient tolerated well. No reaction noted after 20 minutes. VIS sheet provided.   Administered by: Kayy Day MA

## 2022-04-22 NOTE — PROGRESS NOTES
20 y/o  female at 27 weeks 4 days gestation with [facility] 22 presents for prenatal visit   FDP: 10/11/21  Pregnancy has been complicated by nausea and vomiting--improved. Denies vaginal bleeding, loss of fluid and pelvic pain. Denies vaginal bleeding since the first trimester. Positive fetal movement. Denies headache, vision changes and RUQ pain. Denies fever, chills, chest pain, shortness of breath, nausea, vomiting, diarrhea, constipation, dysuria and hematuria. Maternal Wellness Questionnaire reviewed--no concerns. Diagnosis Orders   1. Primigravida in second trimester  Glucose Challenge Gestational    CBC with Auto Differential     Orders Placed This Encounter   Procedures    Tdap (age 6y and older) IM (BOOSTRIX)    Glucose Challenge Gestational    CBC with Auto Differential       EFW 90% at 24 weeks gestation.   Growth scan scheduled for 32 weeks gestation  Follow up prn and 2 weeks for prenatal visit

## 2022-04-27 ENCOUNTER — PATIENT MESSAGE (OUTPATIENT)
Dept: FAMILY MEDICINE CLINIC | Age: 22
End: 2022-04-27

## 2022-04-28 RX ORDER — CLINDAMYCIN PHOSPHATE 10 MG/G
GEL TOPICAL
Qty: 1 EACH | Refills: 5 | Status: SHIPPED | OUTPATIENT
Start: 2022-04-28 | End: 2022-05-05

## 2022-04-28 NOTE — TELEPHONE ENCOUNTER
From: Davon Norris  To: Dr. Sol Lofton  Sent: 4/27/2022 5:25 PM EDT  Subject: Clindamycin Prescription     Hi Dr Lofton,    I know we discussed some gel to help my acne! I honestly forgot and havent seen anything come through the pharmacy. Was the prescription sent? Thank you!   Atul Huerta

## 2022-05-02 ENCOUNTER — ROUTINE PRENATAL (OUTPATIENT)
Dept: OBGYN CLINIC | Age: 22
End: 2022-05-02

## 2022-05-02 VITALS
SYSTOLIC BLOOD PRESSURE: 98 MMHG | DIASTOLIC BLOOD PRESSURE: 62 MMHG | BODY MASS INDEX: 26.82 KG/M2 | HEART RATE: 84 BPM | WEIGHT: 173.8 LBS

## 2022-05-02 DIAGNOSIS — O26.843 UTERINE SIZE DATE DISCREPANCY PREGNANCY, THIRD TRIMESTER: ICD-10-CM

## 2022-05-02 DIAGNOSIS — Z34.03 PRIMIGRAVIDA IN THIRD TRIMESTER: Primary | ICD-10-CM

## 2022-05-02 PROCEDURE — 0502F SUBSEQUENT PRENATAL CARE: CPT | Performed by: OBSTETRICS & GYNECOLOGY

## 2022-05-02 NOTE — PROGRESS NOTES
18 y/o  female at 29 weeks 0 days gestation with [facility] 22 presents for prenatal visit   FDP: 10/11/21  Pregnancy has been complicated by nausea and vomiting--improved/resolved and growth measured at 90th % at 24 weeks. Denies vaginal bleeding, loss of fluid and pelvic pain. Denies vaginal bleeding since the first trimester. Positive fetal movement. Denies headache, vision changes and RUQ pain. Denies fever, chills, chest pain, shortness of breath, nausea, vomiting, diarrhea, constipation, dysuria and hematuria. Maternal Wellness Questionnaire reviewed--no concerns. Diagnosis Orders   1. Primigravida in third trimester     2. Uterine size date discrepancy pregnancy, third trimester       Follow up prn and 2 weeks for prenatal visit and ultrasound for growth.

## 2022-05-18 ENCOUNTER — ROUTINE PRENATAL (OUTPATIENT)
Dept: OBGYN CLINIC | Age: 22
End: 2022-05-18

## 2022-05-18 ENCOUNTER — OFFICE VISIT (OUTPATIENT)
Dept: OBGYN CLINIC | Age: 22
End: 2022-05-18
Payer: COMMERCIAL

## 2022-05-18 VITALS
BODY MASS INDEX: 27.96 KG/M2 | HEART RATE: 80 BPM | SYSTOLIC BLOOD PRESSURE: 116 MMHG | DIASTOLIC BLOOD PRESSURE: 78 MMHG | WEIGHT: 181.2 LBS

## 2022-05-18 DIAGNOSIS — Z34.03 PRIMIGRAVIDA IN THIRD TRIMESTER: Primary | ICD-10-CM

## 2022-05-18 DIAGNOSIS — O26.843 UTERINE SIZE DATE DISCREPANCY PREGNANCY, THIRD TRIMESTER: Primary | ICD-10-CM

## 2022-05-18 DIAGNOSIS — F41.9 ANXIETY: ICD-10-CM

## 2022-05-18 DIAGNOSIS — O26.843 UTERINE SIZE DATE DISCREPANCY PREGNANCY, THIRD TRIMESTER: ICD-10-CM

## 2022-05-18 DIAGNOSIS — Z34.03 PRIMIGRAVIDA IN THIRD TRIMESTER: ICD-10-CM

## 2022-05-18 PROCEDURE — 76816 OB US FOLLOW-UP PER FETUS: CPT | Performed by: OBSTETRICS & GYNECOLOGY

## 2022-05-18 PROCEDURE — 0502F SUBSEQUENT PRENATAL CARE: CPT | Performed by: OBSTETRICS & GYNECOLOGY

## 2022-05-18 RX ORDER — CLINDAMYCIN PHOSPHATE 10 MG/G
GEL TOPICAL 2 TIMES DAILY
COMMUNITY
End: 2022-08-03 | Stop reason: SDUPTHER

## 2022-05-20 NOTE — PROGRESS NOTES
22 y/o  female at 31 weeks 2 days gestation with [facility] 22 presents for prenatal visit and ultrasound for growth  FDLMP: 10/11/21  Pregnancy has been complicated by nausea and vomiting--improved/resolved and growth measured at 90th % at 24 weeks. Admits to ligamentous pain--has been utilizing belly band. Denies vaginal bleeding, loss of fluid and pelvic pain. Denies vaginal bleeding since the first trimester. Positive fetal movement. Denies headache, vision changes and RUQ pain. Denies fever, chills, chest pain, shortness of breath, nausea, vomiting, diarrhea, constipation, dysuria and hematuria. Maternal Wellness Questionnaire reviewed--no concerns. OBSTETRIC ULTRASOUND GROWTH    DATE: 22    PHYSICIAN: LAURA Bunn D.O.     SONOGRAPHER: PUNEET Dooley RDMS    INDICATION: Growth    TYPE OF SCAN: abdominal    FINDINGS:  A single viable intrauterine pregnancy is noted in [place] presentation. Cardiac and somatic activity are noted. The following values were obtained:   Fetal heart rate    137 bpm   BPD      8.50cm  98.1 %   Head Circumference    30.78cm  89.6 %    Abdominal Circumference   30.26cm  76.9 %   Femur Length     29.01cm  89.9 %   Amniotic fluid index    5.86cm   EFW      19.2g  66.4 percentile    Amniotic fluid volume is normal. Based on sonographic criteria the estimated fetal age is 33 weeks and 0 days with EDC of 22. There is a 12 day discordance with the established EDC of 22. The patient has an anterior placenta that is adequate distance in relation to the internal cervical os. The evaluation of the lower uterine segment and cervix reveals normal appearing anatomy. The uterus is unremarkable/gravid. Maternal ovaries and adnexae are not well visualized due to the size of the uterus and patient's gravid state. Anatomy seen includes: heart, stomach, kidneys, bladder    IMPRESSION:  Single live IUP in the third trimester. Adequate interval fetal growth. Imaging is limited secondary to fetal position. The patient is well aware of the limitations of ultrasound in the detection of anomalies. Ultrasound result reviewed--reassurance. Follow up prn and 2 weeks for prenatal visit.

## 2022-06-01 ENCOUNTER — ROUTINE PRENATAL (OUTPATIENT)
Dept: OBGYN CLINIC | Age: 22
End: 2022-06-01

## 2022-06-01 VITALS
WEIGHT: 182 LBS | DIASTOLIC BLOOD PRESSURE: 88 MMHG | HEART RATE: 94 BPM | BODY MASS INDEX: 28.08 KG/M2 | SYSTOLIC BLOOD PRESSURE: 130 MMHG

## 2022-06-01 DIAGNOSIS — O26.843 UTERINE SIZE DATE DISCREPANCY PREGNANCY, THIRD TRIMESTER: ICD-10-CM

## 2022-06-01 DIAGNOSIS — Z34.03 PRIMIGRAVIDA IN THIRD TRIMESTER: Primary | ICD-10-CM

## 2022-06-01 DIAGNOSIS — F41.9 ANXIETY: ICD-10-CM

## 2022-06-01 PROCEDURE — 0502F SUBSEQUENT PRENATAL CARE: CPT | Performed by: OBSTETRICS & GYNECOLOGY

## 2022-06-01 NOTE — PROGRESS NOTES
Return OB Office Visit    CC:   Chief Complaint   Patient presents with    Routine Prenatal Visit       HPI:  Pt seen and examined. No concerns/complaints. Denies VB, LOF, ctx. +FM. Maternal wellness questionnaire reviewed - no concerns today. Objective:  /88   Pulse 94   Wt 182 lb (82.6 kg)   LMP 10/11/2021 (Approximate)   BMI 28.08 kg/m²   Gen: AO, NAD  Abd: Soft, NT  FHT: 145  FH: 33cm, unk by [place]  Ext: Mild LE edema    Assessment/Plan:  23 y.o.  at 33w2d (Estimated Date of Delivery: 22) presents for FLAKO appointment:      Diagnosis Orders   1. Primigravida in third trimester     2. Anxiety     3.  Uterine size date discrepancy pregnancy, third trimester       Doing well today, routine care  - FWB reassuring on FH/doptones today  - last growth   wnl  - return/labor precautions reviewed    Dispo: RTC in 2 weeks  Cari Neri MD

## 2022-06-14 ENCOUNTER — ROUTINE PRENATAL (OUTPATIENT)
Dept: OBGYN CLINIC | Age: 22
End: 2022-06-14

## 2022-06-14 VITALS
WEIGHT: 184.2 LBS | SYSTOLIC BLOOD PRESSURE: 130 MMHG | DIASTOLIC BLOOD PRESSURE: 84 MMHG | BODY MASS INDEX: 28.42 KG/M2 | HEART RATE: 105 BPM

## 2022-06-14 DIAGNOSIS — O26.899 CARPAL TUNNEL SYNDROME DURING PREGNANCY: ICD-10-CM

## 2022-06-14 DIAGNOSIS — O26.843 UTERINE SIZE DATE DISCREPANCY PREGNANCY, THIRD TRIMESTER: ICD-10-CM

## 2022-06-14 DIAGNOSIS — O21.9 NAUSEA AND VOMITING IN PREGNANCY: ICD-10-CM

## 2022-06-14 DIAGNOSIS — G56.00 CARPAL TUNNEL SYNDROME DURING PREGNANCY: ICD-10-CM

## 2022-06-14 DIAGNOSIS — F41.9 ANXIETY: ICD-10-CM

## 2022-06-14 DIAGNOSIS — Z34.03 PRIMIGRAVIDA IN THIRD TRIMESTER: Primary | ICD-10-CM

## 2022-06-14 PROCEDURE — 0502F SUBSEQUENT PRENATAL CARE: CPT | Performed by: OBSTETRICS & GYNECOLOGY

## 2022-06-14 NOTE — PROGRESS NOTES
23 y.o.  at 35w1d EGA Estimated Date of Delivery: 22 here for FLAKO:     Pt seen and examined. No concerns/complaints for carpal tunnel. Works at [employer] and has noticed some swelling in her hands. Gave recommendations for wrist braces both at work and at night as needed. .  Denies VB, LOF, CTX. Endorses (+) FM. Denies fevers / chills / chest pain / shortness of breath. Denies HA, changes with vision, RUQ pain, edema. MWQ reviewed. Pregnancy has been complicated by nausea and vomiting--improved/resolved and growth measured at 90th % at 24 weeks. - last growth US  wnl  Admits to ligamentous pain--has been utilizing belly band. Objective:  /84   Pulse (!) 105   Wt 184 lb 3.2 oz (83.6 kg)   LMP 10/11/2021 (Approximate)   BMI 28.42 kg/m²   Gen: AO, NAD  Abd: Soft, NT, gravid   Ext: Mild LE edema  OMM: Increased lumbar lordosis    Assessment/Plan:   Diagnosis Orders   1. Primigravida in third trimester     2. Uterine size date discrepancy pregnancy, third trimester     3. Anxiety     4. Nausea and vomiting in pregnancy     5. Carpal tunnel syndrome during pregnancy        - reassuring maternal / fetal status   - questions addressed     Follow Up  Return OB precautions reviewed   Return in about 1 week (around 2022) for Return OB visit, Labs. Approximately 20 minutes spent in room counseling patient on condition and coordination of care with over 50% in direct face to face counseling.      Maggi Quiroga DO

## 2022-06-16 NOTE — TELEPHONE ENCOUNTER
Dr. Jeremy Ruiz, patient interested in free prenatal vitamins and iron per response from [id number] [facility]. Order for [facility] Baby Box pending for your convenience. Thank you,  Susana Shearer. Garett, PharmD  [address] [facility]  Department, toll free: [phone]  [address]      =======================================================================    [place] REVIEW - Be Well With Baby    TABITHA Byrd is a 23 y.o. female currently identified as interested in receiving a [facility] \"Baby Box\" containing a 1 year supply of prenatal vitamins from [facility]. Contents of Baby Box:   Welcome Letter   6 month supply of Nature's Blend Prenatal Multivitamin with Minerals (Take 1 softgel once daily) with 1 refill    Thank you  Yanira Bajwa, PharmD  [address] [facility]  Department, toll free: [phone]  [address]

## 2022-06-22 ENCOUNTER — ROUTINE PRENATAL (OUTPATIENT)
Dept: OBGYN CLINIC | Age: 22
End: 2022-06-22

## 2022-06-22 VITALS
BODY MASS INDEX: 28.39 KG/M2 | DIASTOLIC BLOOD PRESSURE: 78 MMHG | WEIGHT: 184 LBS | SYSTOLIC BLOOD PRESSURE: 115 MMHG | HEART RATE: 97 BPM

## 2022-06-22 DIAGNOSIS — O26.843 UTERINE SIZE DATE DISCREPANCY PREGNANCY, THIRD TRIMESTER: ICD-10-CM

## 2022-06-22 DIAGNOSIS — Z34.03 PRIMIGRAVIDA IN THIRD TRIMESTER: Primary | ICD-10-CM

## 2022-06-22 PROCEDURE — 0502F SUBSEQUENT PRENATAL CARE: CPT | Performed by: OBSTETRICS & GYNECOLOGY

## 2022-06-24 NOTE — PROGRESS NOTES
22 y/o  female at 39 weeks 2 days gestation with [facility] 22 presents for prenatal visit   FDP: 10/11/21  Pregnancy has been complicated by nausea and vomiting--improved/resolved and growth measured at 90th % at 24 weeks. Follow up ultrasound on 22--66%  Denies vaginal bleeding, loss of fluid and pelvic pain. Denies vaginal bleeding since the first trimester. Positive fetal movement. Denies headache, vision changes and RUQ pain. Denies fever, chills, chest pain, shortness of breath, nausea, vomiting, diarrhea, constipation, dysuria and hematuria. Maternal Wellness Questionnaire reviewed--no concerns. Closed/thick/high vertex confirmed with ultrasound     Diagnosis Orders   1. Primigravida in third trimester  Culture, Strep B Screen, Vaginal/Rectal   2. Uterine size date discrepancy pregnancy, third trimester       Orders Placed This Encounter   Procedures    Culture, Strep B Screen, Vaginal/Rectal     Labor precautions, kick counts  Follow up prn and 1 week for prenatal visit.

## 2022-06-25 LAB
GROUP B STREP CULTURE: ABNORMAL
ORGANISM: ABNORMAL

## 2022-06-29 ENCOUNTER — ROUTINE PRENATAL (OUTPATIENT)
Dept: OBGYN CLINIC | Age: 22
End: 2022-06-29

## 2022-06-29 VITALS
HEART RATE: 88 BPM | SYSTOLIC BLOOD PRESSURE: 122 MMHG | WEIGHT: 185.4 LBS | DIASTOLIC BLOOD PRESSURE: 82 MMHG | BODY MASS INDEX: 28.61 KG/M2

## 2022-06-29 DIAGNOSIS — Z34.03 PRIMIGRAVIDA IN THIRD TRIMESTER: Primary | ICD-10-CM

## 2022-06-29 DIAGNOSIS — O99.820 GROUP B STREPTOCOCCUS CARRIER, +RV CULTURE, CURRENTLY PREGNANT: ICD-10-CM

## 2022-06-29 DIAGNOSIS — F41.9 ANXIETY: ICD-10-CM

## 2022-06-29 PROCEDURE — 0502F SUBSEQUENT PRENATAL CARE: CPT | Performed by: OBSTETRICS & GYNECOLOGY

## 2022-06-29 NOTE — PROGRESS NOTES
TEMP 97.8 f   INFARED    Maternal emotional well being screening form completed and reviewed with patient. Current score is 0. Patient given referral to [facility] ([phone]):  No

## 2022-06-30 PROBLEM — O99.820 GROUP B STREPTOCOCCUS CARRIER, +RV CULTURE, CURRENTLY PREGNANT: Status: ACTIVE | Noted: 2022-06-30

## 2022-06-30 NOTE — PROGRESS NOTES
22 y/o  female at 37 weeks 2 days gestation with [facility] 22 presents for prenatal visit   [facility]  Pregnancy has been complicated by nausea and vomiting--improved/resolved and growth measured at 90th % at 24 weeks. Follow up ultrasound on 22--66%  Denies vaginal bleeding, loss of fluid and pelvic pain. Denies vaginal bleeding since the first trimester. Positive fetal movement. Denies headache, vision changes and RUQ pain. Denies fever, chills, chest pain, shortness of breath, nausea, vomiting, diarrhea, constipation, dysuria and hematuria. Maternal Wellness Questionnaire reviewed--no concerns. Closed/thick/high vertex confirmed with ultrasound     Diagnosis Orders   1. Primigravida in third trimester     2. Anxiety     3.  Group B Streptococcus carrier, +RV culture, currently pregnant         Labor precautions, kick counts  Follow up prn and 1 week for prenatal visit

## 2022-07-06 ENCOUNTER — ROUTINE PRENATAL (OUTPATIENT)
Dept: OBGYN CLINIC | Age: 22
End: 2022-07-06

## 2022-07-06 VITALS
DIASTOLIC BLOOD PRESSURE: 82 MMHG | BODY MASS INDEX: 29.29 KG/M2 | WEIGHT: 189.8 LBS | SYSTOLIC BLOOD PRESSURE: 115 MMHG | HEART RATE: 86 BPM

## 2022-07-06 DIAGNOSIS — O26.843 UTERINE SIZE DATE DISCREPANCY PREGNANCY, THIRD TRIMESTER: ICD-10-CM

## 2022-07-06 DIAGNOSIS — O99.820 GROUP B STREPTOCOCCUS CARRIER, +RV CULTURE, CURRENTLY PREGNANT: ICD-10-CM

## 2022-07-06 DIAGNOSIS — F41.9 ANXIETY: ICD-10-CM

## 2022-07-06 DIAGNOSIS — Z34.03 PRIMIGRAVIDA IN THIRD TRIMESTER: Primary | ICD-10-CM

## 2022-07-06 PROCEDURE — 0502F SUBSEQUENT PRENATAL CARE: CPT | Performed by: OBSTETRICS & GYNECOLOGY

## 2022-07-06 NOTE — PROGRESS NOTES
Temp 98.2 F   infared      Maternal emotional well being screening form completed and reviewed with patient. Current score is 0. Patient given referral to [facility] ([phone]):  No

## 2022-07-12 NOTE — PROGRESS NOTES
24 y/o  female at 45 weeks 2 days gestation with [facility] 22 presents for prenatal visit   FDP: 10/11/21   Pregnancy has been complicated by nausea and vomiting--improved/resolved and growth measured at 90th % at 24 weeks. Follow up ultrasound on 22--66%  Denies vaginal bleeding, loss of fluid and pelvic pain. Denies vaginal bleeding since the first trimester. Positive fetal movement. Denies headache, vision changes and RUQ pain. Denies fever, chills, chest pain, shortness of breath, nausea, vomiting, diarrhea, constipation, dysuria and hematuria. Maternal Wellness Questionnaire reviewed--no concerns. Closed/50/high vertex confirmed with ultrasound   Diagnosis Orders   1. Primigravida in third trimester     2. Uterine size date discrepancy pregnancy, third trimester     3. Group B Streptococcus carrier, +RV culture, currently pregnant     4. Anxiety         Options reviewed: induction of labor versus expectant management. Risks and benefits discussed.    Labor precautions, kick counts  Follow up prn and 1 week for prenatal visit

## 2022-07-13 ENCOUNTER — ROUTINE PRENATAL (OUTPATIENT)
Dept: OBGYN CLINIC | Age: 22
End: 2022-07-13

## 2022-07-13 VITALS
BODY MASS INDEX: 29.72 KG/M2 | HEART RATE: 98 BPM | DIASTOLIC BLOOD PRESSURE: 76 MMHG | SYSTOLIC BLOOD PRESSURE: 117 MMHG | WEIGHT: 192.6 LBS

## 2022-07-13 DIAGNOSIS — O99.820 GROUP B STREPTOCOCCUS CARRIER, +RV CULTURE, CURRENTLY PREGNANT: ICD-10-CM

## 2022-07-13 DIAGNOSIS — O26.843 UTERINE SIZE DATE DISCREPANCY PREGNANCY, THIRD TRIMESTER: ICD-10-CM

## 2022-07-13 DIAGNOSIS — Z34.03 PRIMIGRAVIDA IN THIRD TRIMESTER: Primary | ICD-10-CM

## 2022-07-13 PROCEDURE — 0502F SUBSEQUENT PRENATAL CARE: CPT | Performed by: OBSTETRICS & GYNECOLOGY

## 2022-07-13 NOTE — PROGRESS NOTES
Temp 98.2 F   infared   Maternal emotional well being screening form completed and reviewed with patient. Current score is 0. Patient given referral to [facility] ([phone]):  No

## 2022-07-18 ENCOUNTER — HOSPITAL ENCOUNTER (INPATIENT)
Age: 22
LOS: 3 days | Discharge: HOME | End: 2022-07-22
Attending: OBSTETRICS & GYNECOLOGY | Admitting: OBSTETRICS & GYNECOLOGY
Payer: COMMERCIAL

## 2022-07-18 ENCOUNTER — TELEPHONE (OUTPATIENT)
Dept: OBGYN CLINIC | Age: 22
End: 2022-07-18

## 2022-07-18 ENCOUNTER — ROUTINE PRENATAL (OUTPATIENT)
Dept: OBGYN CLINIC | Age: 22
End: 2022-07-18

## 2022-07-18 VITALS
HEART RATE: 84 BPM | SYSTOLIC BLOOD PRESSURE: 127 MMHG | DIASTOLIC BLOOD PRESSURE: 72 MMHG | WEIGHT: 196.8 LBS | BODY MASS INDEX: 30.37 KG/M2

## 2022-07-18 DIAGNOSIS — O26.843 UTERINE SIZE DATE DISCREPANCY PREGNANCY, THIRD TRIMESTER: ICD-10-CM

## 2022-07-18 DIAGNOSIS — O99.820 GROUP B STREPTOCOCCUS CARRIER, +RV CULTURE, CURRENTLY PREGNANT: ICD-10-CM

## 2022-07-18 DIAGNOSIS — Z34.03 PRIMIGRAVIDA IN THIRD TRIMESTER: Primary | ICD-10-CM

## 2022-07-18 PROCEDURE — 0502F SUBSEQUENT PRENATAL CARE: CPT | Performed by: OBSTETRICS & GYNECOLOGY

## 2022-07-18 NOTE — TELEPHONE ENCOUNTER
Patient called the office and stated she feels like she may be having contractions. Stated they were about a minute in duration, about 10 mins apart, 5 mins apart, 5 mins apart, then lasted about 2.5 mins. +FM movement. Denies any vaginal discharge, stated she has some pressure. Was given labor/FM? discharge/bleeding/pain precautions. When to seek care in L&D and to call office. Verbalized understanding.      Routing to Dr. Jerson Ordaz Dr/ Lio Love (on call)

## 2022-07-19 ENCOUNTER — ANESTHESIA (OUTPATIENT)
Dept: LABOR AND DELIVERY | Age: 22
End: 2022-07-19
Payer: COMMERCIAL

## 2022-07-19 ENCOUNTER — ANESTHESIA EVENT (OUTPATIENT)
Dept: LABOR AND DELIVERY | Age: 22
End: 2022-07-19
Payer: COMMERCIAL

## 2022-07-19 PROBLEM — Z37.9 NORMAL LABOR: Status: ACTIVE | Noted: 2022-07-19

## 2022-07-19 LAB
A/G RATIO: 1.3 (ref 1.1–2.2)
ABO/RH: NORMAL
ALBUMIN SERPL-MCNC: 4.1 G/DL (ref 3.4–5)
ALP BLD-CCNC: 173 U/L (ref 40–129)
ALT SERPL-CCNC: 14 U/L (ref 10–40)
AMPHETAMINE SCREEN, URINE: NORMAL
ANION GAP SERPL CALCULATED.3IONS-SCNC: 12 MMOL/L (ref 3–16)
ANTIBODY SCREEN: NORMAL
AST SERPL-CCNC: 18 U/L (ref 15–37)
BARBITURATE SCREEN URINE: NORMAL
BASOPHILS ABSOLUTE: 0.1 K/UL (ref 0–0.2)
BASOPHILS RELATIVE PERCENT: 0.7 %
BENZODIAZEPINE SCREEN, URINE: NORMAL
BILIRUB SERPL-MCNC: 0.3 MG/DL (ref 0–1)
BUN BLDV-MCNC: 8 MG/DL (ref 7–20)
BUPRENORPHINE URINE: NORMAL
CALCIUM SERPL-MCNC: 9.5 MG/DL (ref 8.3–10.6)
CANNABINOID SCREEN URINE: NORMAL
CHLORIDE BLD-SCNC: 102 MMOL/L (ref 99–110)
CO2: 17 MMOL/L (ref 21–32)
COCAINE METABOLITE SCREEN URINE: NORMAL
CREAT SERPL-MCNC: <0.5 MG/DL (ref 0.6–1.1)
CREATININE URINE: 12.8 MG/DL (ref 28–259)
EOSINOPHILS ABSOLUTE: 0.1 K/UL (ref 0–0.6)
EOSINOPHILS RELATIVE PERCENT: 0.4 %
GFR AFRICAN AMERICAN: >60
GFR NON-AFRICAN AMERICAN: >60
GLUCOSE BLD-MCNC: 97 MG/DL (ref 70–99)
HCT VFR BLD CALC: 39.3 % (ref 36–48)
HEMOGLOBIN: 13.5 G/DL (ref 12–16)
LYMPHOCYTES ABSOLUTE: 2.2 K/UL (ref 1–5.1)
LYMPHOCYTES RELATIVE PERCENT: 14.4 %
Lab: NORMAL
MCH RBC QN AUTO: 32.3 PG (ref 26–34)
MCHC RBC AUTO-ENTMCNC: 34.4 G/DL (ref 31–36)
MCV RBC AUTO: 93.9 FL (ref 80–100)
METHADONE SCREEN, URINE: NORMAL
MONOCYTES ABSOLUTE: 0.9 K/UL (ref 0–1.3)
MONOCYTES RELATIVE PERCENT: 6 %
NEUTROPHILS ABSOLUTE: 12.1 K/UL (ref 1.7–7.7)
NEUTROPHILS RELATIVE PERCENT: 78.5 %
OPIATE SCREEN URINE: NORMAL
OXYCODONE URINE: NORMAL
PDW BLD-RTO: 13.1 % (ref 12.4–15.4)
PH UA: 6.5
PHENCYCLIDINE SCREEN URINE: NORMAL
PLATELET # BLD: 246 K/UL (ref 135–450)
PMV BLD AUTO: 8.8 FL (ref 5–10.5)
POTASSIUM SERPL-SCNC: 3.7 MMOL/L (ref 3.5–5.1)
PROPOXYPHENE SCREEN: NORMAL
PROTEIN PROTEIN: <4 MG/DL
PROTEIN/CREAT RATIO: ABNORMAL MG/DL
RBC # BLD: 4.18 M/UL (ref 4–5.2)
SODIUM BLD-SCNC: 131 MMOL/L (ref 136–145)
TOTAL PROTEIN: 7.3 G/DL (ref 6.4–8.2)
TOTAL SYPHILLIS IGG/IGM: NORMAL
URIC ACID, SERUM: 4.4 MG/DL (ref 2.6–6)
WBC # BLD: 15.4 K/UL (ref 4–11)

## 2022-07-19 PROCEDURE — 86900 BLOOD TYPING SEROLOGIC ABO: CPT

## 2022-07-19 PROCEDURE — 86901 BLOOD TYPING SEROLOGIC RH(D): CPT

## 2022-07-19 PROCEDURE — 2500000003 HC RX 250 WO HCPCS: Performed by: NURSE ANESTHETIST, CERTIFIED REGISTERED

## 2022-07-19 PROCEDURE — 6370000000 HC RX 637 (ALT 250 FOR IP): Performed by: OBSTETRICS & GYNECOLOGY

## 2022-07-19 PROCEDURE — 85025 COMPLETE CBC W/AUTO DIFF WBC: CPT

## 2022-07-19 PROCEDURE — 1220000000 HC SEMI PRIVATE OB R&B

## 2022-07-19 PROCEDURE — 6360000002 HC RX W HCPCS: Performed by: OBSTETRICS & GYNECOLOGY

## 2022-07-19 PROCEDURE — 80307 DRUG TEST PRSMV CHEM ANLYZR: CPT

## 2022-07-19 PROCEDURE — 82570 ASSAY OF URINE CREATININE: CPT

## 2022-07-19 PROCEDURE — 86850 RBC ANTIBODY SCREEN: CPT

## 2022-07-19 PROCEDURE — 2580000003 HC RX 258: Performed by: OBSTETRICS & GYNECOLOGY

## 2022-07-19 PROCEDURE — 86780 TREPONEMA PALLIDUM: CPT

## 2022-07-19 PROCEDURE — 51702 INSERT TEMP BLADDER CATH: CPT

## 2022-07-19 PROCEDURE — 51701 INSERT BLADDER CATHETER: CPT

## 2022-07-19 PROCEDURE — 80053 COMPREHEN METABOLIC PANEL: CPT

## 2022-07-19 PROCEDURE — 84550 ASSAY OF BLOOD/URIC ACID: CPT

## 2022-07-19 PROCEDURE — 3700000025 EPIDURAL BLOCK: Performed by: ANESTHESIOLOGY

## 2022-07-19 PROCEDURE — 84156 ASSAY OF PROTEIN URINE: CPT

## 2022-07-19 RX ORDER — SODIUM CHLORIDE 0.9 % (FLUSH) 0.9 %
5-40 SYRINGE (ML) INJECTION EVERY 12 HOURS SCHEDULED
Status: DISCONTINUED | OUTPATIENT
Start: 2022-07-19 | End: 2022-07-20

## 2022-07-19 RX ORDER — ONDANSETRON 2 MG/ML
4 INJECTION INTRAMUSCULAR; INTRAVENOUS EVERY 6 HOURS PRN
Status: DISCONTINUED | OUTPATIENT
Start: 2022-07-19 | End: 2022-07-20

## 2022-07-19 RX ORDER — SODIUM CHLORIDE 0.9 % (FLUSH) 0.9 %
5-40 SYRINGE (ML) INJECTION PRN
Status: DISCONTINUED | OUTPATIENT
Start: 2022-07-19 | End: 2022-07-20

## 2022-07-19 RX ORDER — SODIUM CHLORIDE 9 MG/ML
25 INJECTION, SOLUTION INTRAVENOUS PRN
Status: DISCONTINUED | OUTPATIENT
Start: 2022-07-19 | End: 2022-07-20

## 2022-07-19 RX ORDER — ACETAMINOPHEN 325 MG/1
650 TABLET ORAL EVERY 4 HOURS PRN
Status: DISCONTINUED | OUTPATIENT
Start: 2022-07-19 | End: 2022-07-20

## 2022-07-19 RX ORDER — BUPIVACAINE HYDROCHLORIDE 5 MG/ML
INJECTION, SOLUTION EPIDURAL; INTRACAUDAL PRN
Status: DISCONTINUED | OUTPATIENT
Start: 2022-07-19 | End: 2022-07-22 | Stop reason: SDUPTHER

## 2022-07-19 RX ORDER — SODIUM CHLORIDE, SODIUM LACTATE, POTASSIUM CHLORIDE, CALCIUM CHLORIDE 600; 310; 30; 20 MG/100ML; MG/100ML; MG/100ML; MG/100ML
INJECTION, SOLUTION INTRAVENOUS CONTINUOUS
Status: DISCONTINUED | OUTPATIENT
Start: 2022-07-19 | End: 2022-07-20

## 2022-07-19 RX ORDER — SODIUM CHLORIDE, SODIUM LACTATE, POTASSIUM CHLORIDE, AND CALCIUM CHLORIDE .6; .31; .03; .02 G/100ML; G/100ML; G/100ML; G/100ML
1000 INJECTION, SOLUTION INTRAVENOUS PRN
Status: DISCONTINUED | OUTPATIENT
Start: 2022-07-19 | End: 2022-07-20

## 2022-07-19 RX ORDER — SODIUM CHLORIDE, SODIUM LACTATE, POTASSIUM CHLORIDE, AND CALCIUM CHLORIDE .6; .31; .03; .02 G/100ML; G/100ML; G/100ML; G/100ML
500 INJECTION, SOLUTION INTRAVENOUS PRN
Status: DISCONTINUED | OUTPATIENT
Start: 2022-07-19 | End: 2022-07-20

## 2022-07-19 RX ORDER — BUPIVACAINE HYDROCHLORIDE 2.5 MG/ML
INJECTION, SOLUTION EPIDURAL; INFILTRATION; INTRACAUDAL PRN
Status: DISCONTINUED | OUTPATIENT
Start: 2022-07-19 | End: 2022-07-22 | Stop reason: SDUPTHER

## 2022-07-19 RX ADMIN — SODIUM CHLORIDE, POTASSIUM CHLORIDE, SODIUM LACTATE AND CALCIUM CHLORIDE: 600; 310; 30; 20 INJECTION, SOLUTION INTRAVENOUS at 04:13

## 2022-07-19 RX ADMIN — Medication 2.5 MILLION UNITS: at 11:05

## 2022-07-19 RX ADMIN — Medication 2.5 MILLION UNITS: at 19:02

## 2022-07-19 RX ADMIN — Medication 15 ML/HR: at 03:55

## 2022-07-19 RX ADMIN — Medication 2.5 MILLION UNITS: at 22:55

## 2022-07-19 RX ADMIN — Medication 2.5 MILLION UNITS: at 06:49

## 2022-07-19 RX ADMIN — ACETAMINOPHEN 650 MG: 325 TABLET ORAL at 23:55

## 2022-07-19 RX ADMIN — DEXTROSE MONOHYDRATE 5 MILLION UNITS: 5 INJECTION INTRAVENOUS at 02:57

## 2022-07-19 RX ADMIN — ACETAMINOPHEN 650 MG: 325 TABLET ORAL at 14:07

## 2022-07-19 RX ADMIN — BUPIVACAINE HYDROCHLORIDE 5 ML: 2.5 INJECTION, SOLUTION EPIDURAL; INFILTRATION; INTRACAUDAL; PERINEURAL at 03:50

## 2022-07-19 RX ADMIN — SODIUM CHLORIDE, POTASSIUM CHLORIDE, SODIUM LACTATE AND CALCIUM CHLORIDE: 600; 310; 30; 20 INJECTION, SOLUTION INTRAVENOUS at 02:57

## 2022-07-19 RX ADMIN — BUPIVACAINE HYDROCHLORIDE 7 ML: 5 INJECTION, SOLUTION EPIDURAL; INTRACAUDAL; PERINEURAL at 17:23

## 2022-07-19 RX ADMIN — SODIUM CHLORIDE, POTASSIUM CHLORIDE, SODIUM LACTATE AND CALCIUM CHLORIDE: 600; 310; 30; 20 INJECTION, SOLUTION INTRAVENOUS at 23:56

## 2022-07-19 RX ADMIN — Medication 1 MILLI-UNITS/MIN: at 07:07

## 2022-07-19 RX ADMIN — SODIUM CHLORIDE, POTASSIUM CHLORIDE, SODIUM LACTATE AND CALCIUM CHLORIDE: 600; 310; 30; 20 INJECTION, SOLUTION INTRAVENOUS at 03:33

## 2022-07-19 RX ADMIN — BUPIVACAINE HYDROCHLORIDE 7 ML: 2.5 INJECTION, SOLUTION EPIDURAL; INFILTRATION; INTRACAUDAL; PERINEURAL at 10:26

## 2022-07-19 RX ADMIN — Medication 2.5 MILLION UNITS: at 15:00

## 2022-07-19 RX ADMIN — BUPIVACAINE HYDROCHLORIDE 7 ML: 5 INJECTION, SOLUTION EPIDURAL; INTRACAUDAL; PERINEURAL at 21:15

## 2022-07-19 RX ADMIN — BUPIVACAINE HYDROCHLORIDE 5 ML: 5 INJECTION, SOLUTION EPIDURAL; INTRACAUDAL; PERINEURAL at 03:50

## 2022-07-19 ASSESSMENT — PAIN SCALES - GENERAL
PAINLEVEL_OUTOF10: 6
PAINLEVEL_OUTOF10: 5

## 2022-07-19 ASSESSMENT — PAIN DESCRIPTION - DESCRIPTORS: DESCRIPTORS: DISCOMFORT

## 2022-07-19 ASSESSMENT — PAIN - FUNCTIONAL ASSESSMENT: PAIN_FUNCTIONAL_ASSESSMENT: ACTIVITIES ARE NOT PREVENTED

## 2022-07-19 ASSESSMENT — PAIN DESCRIPTION - LOCATION
LOCATION: HEAD
LOCATION: HEAD

## 2022-07-19 NOTE — PROGRESS NOTES
24 y/o  female at 44 weeks 2 days gestation with [facility] 22 presents for prenatal visit   FDP: 10/11/21   Pregnancy has been complicated by nausea and vomiting--improved/resolved and growth measured at 90th % at 24 weeks. Follow up ultrasound on 22--66%  Denies vaginal bleeding, loss of fluid and pelvic pain. Denies vaginal bleeding since the first trimester. Admits to some abdominal tightening last week. Positive fetal movement. Denies headache, vision changes and RUQ pain. Denies fever, chills, chest pain, shortness of breath, nausea, vomiting, diarrhea, constipation, dysuria and hematuria. Maternal Wellness Questionnaire reviewed--no concerns. Closed/50/high vertex confirmed with ultrasound   Diagnosis Orders   1. Primigravida in third trimester        2. Uterine size date discrepancy pregnancy, third trimester        3. Group B Streptococcus carrier, +RV culture, currently pregnant          Options reviewed:  Expectant management versus induction of labor.    Induction of labor scheduled for 22  Antibiotics while in labor  Labor precautions, kick counts  Follow up in 1 week for prenatal visit

## 2022-07-19 NOTE — PLAN OF CARE
Problem: Pain  Goal: Verbalizes/displays adequate comfort level or baseline comfort level  2022 07 by Jayson Galvez RN  Outcome: Progressing Towards Goal  2022 0403 by Tiffanie Garcia RN  Outcome: Progressing Towards Goal  Flowsheets (Taken 2022 0000)  Verbalizes/displays adequate comfort level or baseline comfort level:   Encourage patient to monitor pain and request assistance   Assess pain using appropriate pain scale   Administer analgesics based on type and severity of pain and evaluate response   Implement non-pharmacological measures as appropriate and evaluate response   Consider cultural and social influences on pain and pain management   Notify Licensed Independent Practitioner if interventions unsuccessful or patient reports new pain     Problem: Vaginal Birth or  Section  Goal: Fetal and maternal status remain reassuring during the birth process  Description:  Birth OB-Pregnancy care plan goal which identifies if the fetal and maternal status remain reassuring during the birth process  2022 by Jayson Galvez RN  Outcome: Progressing Towards Goal  2022 0403 by Tiffanie Garcia RN  Outcome: Progressing Towards Goal     Problem: Infection - Adult  Goal: Absence of infection at discharge  2022 0732 by Jayson Galvez RN  Outcome: Progressing Towards Goal  20223 by Tiffanie Garcia RN  Outcome: Progressing Towards Goal  Goal: Absence of infection during hospitalization  2022 by Jayson Galvez RN  Outcome: Progressing Towards Goal  2022 by Tiffanie Garcia RN  Outcome: Progressing Towards Goal  Goal: Absence of fever/infection during anticipated neutropenic period  2022 07 by Jayson Galvez RN  Outcome: Progressing Towards Goal  2022 0403 by Tiffanie Garcia RN  Outcome: Progressing Towards Goal     Problem: Safety - Adult  Goal: Free from fall injury  2022 by Jayson Galvez RN  Outcome: Progressing Towards Goal  7/19/2022 0403 by Doron Morrow RN  Outcome: Progressing Towards Goal     Problem: Discharge Planning  Goal: Discharge to home or other facility with appropriate resources  7/19/2022 0732 by Chasidy Hooper RN  Outcome: Progressing Towards Goal  7/19/2022 0403 by Doron Morrow RN  Outcome: Progressing Towards Goal     Problem: Chronic Conditions and Co-morbidities  Goal: Patient's chronic conditions and co-morbidity symptoms are monitored and maintained or improved  7/19/2022 0732 by Chasidy Hooper RN  Outcome: Progressing Towards Goal  7/19/2022 0403 by Doron Morrow RN  Outcome: Progressing Towards Goal

## 2022-07-19 NOTE — H&P
Obstetrics Admission History and Physical    CC:   Chief Complaint   Patient presents with    Contractions     Contractions started on 22 at 2130       HPI: Heather Red is a 23 y.o. Thedore Portal at 40w1d who presents with complaints of contractions. Started on  at 2130, progressively got worse so came to hospital. No VB, LOF. +FM. Comfortable now with epidural, no complaints. Review of Systems: The following ROS was otherwise negative, except as noted in the HPI: constitutional, HEENT, respiratory, cardiovascular, gastrointestinal, genitourinary, skin, musculoskeletal, neurological, psych. OBGYN Provider : [facility]    Obstetrical History:  OB History    Para Term  AB Living   1 0 0 0 0 0   SAB IAB Ectopic Molar Multiple Live Births   0 0 0 0 0 0      # Outcome Date GA Lbr Reji/2nd Weight Sex Delivery Anes PTL Lv   1 Current                Gynecologic History:  Denies hx of abnl pap smears. Denies hx of STIs. Past Medical History:   Past Medical History:   Diagnosis Date    Carpal tunnel syndrome during pregnancy      - both hands    Psychiatric problem     anxiety       Medications:  No current facility-administered medications on file prior to encounter. Current Outpatient Medications on File Prior to Encounter   Medication Sig Dispense Refill    Misc. Devices KIT Nature's Blend Prenatal Multivitamin with Minerals [facility] Baby Box)  Nam Velasco 47: [address]-73; Take 1 softgel by mouth daily or as instructed by provider;  #qs for 6 months 1 kit 1    clindamycin (CLINDAGEL) 1 % gel Apply topically 2 times daily Apply topically 2 times daily.       Loratadine (CLARITIN PO) Take by mouth      ondansetron (ZOFRAN) 4 MG tablet Take 1 tablet by mouth every 8 hours as needed for Nausea (Patient not taking: Reported on 2022) 20 tablet 1    [id number] PO Take by mouth (Patient not taking: Reported on 2022)      Omega-3 Fatty Acids (FISH OIL) 1000 MG CAPS Take 3,000 mg by mouth 3 times daily      DHEA 50 MG CAPS Take by mouth      Cholecalciferol (VITAMIN D) 50 MCG (2000 UT) CAPS capsule Take by mouth      ondansetron (ZOFRAN-ODT) 4 MG disintegrating tablet Take 1 tablet by mouth every 8 hours as needed for Nausea (Patient not taking: Reported on 7/18/2022) 20 tablet 1       Allergies:  Seasonal    Surgical History:  Past Surgical History:   Procedure Laterality Date    WISDOM TOOTH EXTRACTION         Family History:  Family History   Problem Relation Age of Onset    Diabetes Paternal Grandmother     Spont Abortions Mother     [de-identified] Abortions Sister        Social History:  Social History     Substance and Sexual Activity   Alcohol Use Not Currently    Comment: socially     Social History     Substance and Sexual Activity   Drug Use Never     Social History     Tobacco Use   Smoking Status Never   Smokeless Tobacco Never       Physical Exam:  /89   Pulse 90   Temp 97.7 °F (36.5 °C) (Oral)   Resp 16   Ht 5' 6\" (1.676 m)   Wt 196 lb (88.9 kg)   LMP 10/11/2021 (Approximate)   SpO2 99%   BMI 31.64 kg/m²   Physical Exam  HENT:      Head: Normocephalic. Cardiovascular:      Rate and Rhythm: Normal rate. Pulmonary:      Effort: Pulmonary effort is normal.   Abdominal:      Palpations: Abdomen is soft. Tenderness: There is no abdominal tenderness. Neurological:      Mental Status: She is alert.    Psychiatric:         Mood and Affect: Mood normal.       Cervix: fingertip/50/-2 --> 1/80/-2 on admission, now 3/90/-2    Fetal Heart Monitor Interpretation:   FHT: 130 bpm, moderate variability, + accels, no decels  [place]: q2-4 minutes    Labs:  Admission on 07/18/2022   Component Date Value    WBC 07/19/2022 15.4 (A)    RBC 07/19/2022 4.18     Hemoglobin 07/19/2022 13.5     Hematocrit 07/19/2022 39.3     MCV 07/19/2022 93.9     MCH 07/19/2022 32.3     MCHC 07/19/2022 34.4     RDW 07/19/2022 13.1     Platelets 61/92/3206 246     MPV 07/19/2022 8.8     Neutrophils % 07/19/2022 78.5     Lymphocytes % 07/19/2022 14.4     Monocytes % 07/19/2022 6.0     Eosinophils % 07/19/2022 0.4     Basophils % 07/19/2022 0.7     Neutrophils Absolute 07/19/2022 12.1 (A)    Lymphocytes Absolute 07/19/2022 2.2     Monocytes Absolute 07/19/2022 0.9     Eosinophils Absolute 07/19/2022 0.1     Basophils Absolute 07/19/2022 0.1     ABO/Rh 07/19/2022 A POS     Antibody Screen 07/19/2022 NEG     Total Syphillis IgG/IgM 07/19/2022 Non-Reactive     Amphetamine Screen, Urine 07/19/2022 Neg     Barbiturate Screen, Ur 07/19/2022 Neg     Benzodiazepine Screen, U* 07/19/2022 Neg     Cannabinoid Scrn, Ur 07/19/2022 Neg     Cocaine Metabolite Scree* 07/19/2022 Neg     Opiate Scrn, Ur 07/19/2022 Neg     PCP Screen, Urine 07/19/2022 Neg     Methadone Screen, Urine 07/19/2022 Neg     Propoxyphene Scrn, Ur 07/19/2022 Neg     Oxycodone Urine 07/19/2022 Neg     Buprenorphine Urine 07/19/2022 Neg     pH, UA 07/19/2022 6.5     Drug Screen Comment: 07/19/2022 see below     Sodium 07/19/2022 131 (A)    Potassium 07/19/2022 3.7     Chloride 07/19/2022 102     CO2 07/19/2022 17 (A)    Anion Gap 07/19/2022 12     Glucose 07/19/2022 97     BUN 07/19/2022 8     CREATININE 07/19/2022 <0.5 (A)    GFR Non- 07/19/2022 >60     GFR  07/19/2022 >60     Calcium 07/19/2022 9.5     Total Protein 07/19/2022 7.3     Albumin 07/19/2022 4.1     Albumin/Globulin Ratio 07/19/2022 1.3     Total Bilirubin 07/19/2022 0.3     Alkaline Phosphatase 07/19/2022 173 (A)    ALT 07/19/2022 14     AST 07/19/2022 18     Uric Acid, Serum 07/19/2022 4.4     Protein, Ur 07/19/2022 <4.00     Creatinine, Ur 07/19/2022 12.8 (A)    Protein/Creat Ratio 07/19/2022 see below        Assessment/Plan:   23 y.o. Bernard Phillips at 40w1d admitted for labor    FWB - reassuring  - cEFM  - intermittent category 2 tracing, improved now and category 1 with accels    2.  Labor  - admitted to L&D  - brief period of pitocin with late decelerations so this was discontinued  - making cervical change now, will continue to monitor off pitocin and recheck in 2-3 hours or PRN pain/pressure  - comfortable with epidural    3. Elevated blood pressure  - on admission, normotensive mostly since epidural  - HELLP labs stable  - continue to monitor    4.  A+/ab-, RI, GBS+  - on PCN    Dispo: admit for labor, delivery and Jane Hargrove MD Yes - the patient is able to be screened

## 2022-07-19 NOTE — PROGRESS NOTES
Informed Dr Pietro Astudillo that pt is now fingertip/90/-3, rates pain 9/10, chris every 3 - 5 minutes consistently, CAT I tracing, and Bps 130s-140s/80s-90s. Received order to admit pt, start Penicillin, obtain PEC labs, recheck cervix in 2 hours (call Dr Pietro Astudillo) with possibly starting pitocin. Pt does not have any covid symptoms and fully vaccinated with booster. Received order not to do rapid covid swab. Discussed plan with pt. Pt and  agree with plan.

## 2022-07-19 NOTE — PROGRESS NOTES
Labor Progress Note    Subjective:   Pt seen and examined. Doing well, no concerns/complaints. +FM. Some increased pain earlier, improved with epidural redose. No VB/LOF. Objective:  BP (!) 143/92   Pulse 80   Temp 97.8 °F (36.6 °C) (Oral)   Resp 16   Ht 5' 6\" (1.676 m)   Wt 196 lb (88.9 kg)   LMP 10/11/2021 (Approximate)   SpO2 99%   BMI 31.64 kg/m²   Cervix: 4/90/-1      Fetal Monitoring Interpretation   FHTs: 130 baseline, mod domi, +accels, intermittent variable and late decels with <50% of contractions  [place]: q2-4 min, pitocin now at 1mU    Infusions:    lactated ringers Stopped (07/19/22 0932)    sodium chloride      oxytocin 1 yuniel-units/min (07/19/22 1633)     ? Assessment/Plan:  1. Intrapartum  - FHTs overall reassuring, curretnly category 1 with intermittent cat 2  - Continue expectant mgmt, titrate pitocin per protocol and recheck in 3-4 hours.  If unchanged at that time consider AROM with internal monitors as needed    Shira Mora MD

## 2022-07-19 NOTE — PROGRESS NOTES
40 weeks 0 days, Spoke with Dr Pietro Astudillo regarding pt's admission to triage for chris every 4 - 5 minutes, that started at 2130 this evening. Informed Dr Pietro Astudillo of initial /89 and repeat BP 15 min later was 128/80. Pt is fingertip/50/-2. Received orders from Dr Pietro Astudillo to continuing monitoring, recheck cervix in 1 hour and call back.

## 2022-07-19 NOTE — ANESTHESIA PRE PROCEDURE
Department of Anesthesiology  Preprocedure Note       Name:  Maddie Thomas   Age:  23 y. o.  :  2000                                          MRN:  [id number]         Date:  2022      Surgeon: * No surgeons listed *    Procedure: * No procedures listed *    Medications prior to admission:   Prior to Admission medications    Medication Sig Start Date End Date Taking? Authorizing Provider   Misc. Devices KIT Nature's Blend Prenatal Multivitamin with Minerals [facility] Baby Box)  Nam Velasco 45: [address]-61; Take 1 softgel by mouth daily or as instructed by provider;  #qs for 6 months 22   Nereyda Avitia MD   clindamycin (CLINDAGEL) 1 % gel Apply topically 2 times daily Apply topically 2 times daily.     Historical Provider, MD   Loratadine (CLARITIN PO) Take by mouth    Historical Provider, MD   ondansetron (ZOFRAN) 4 MG tablet Take 1 tablet by mouth every 8 hours as needed for Nausea  Patient not taking: Reported on 2022   Dipika Bunn DO   [id number] PO Take by mouth  Patient not taking: Reported on 2022    Historical Provider, MD   Omega-3 Fatty Acids (FISH OIL) 1000 MG CAPS Take 3,000 mg by mouth 3 times daily    Historical Provider, MD   DHEA 50 MG CAPS Take by mouth    Historical Provider, MD   Cholecalciferol (VITAMIN D) 50 MCG (2000) CAPS capsule Take by mouth    Historical Provider, MD   ondansetron (ZOFRAN-ODT) 4 MG disintegrating tablet Take 1 tablet by mouth every 8 hours as needed for Nausea  Patient not taking: Reported on 2022   Dipika Bunn DO       Current medications:    Current Facility-Administered Medications   Medication Dose Route Frequency Provider Last Rate Last Admin    lactated ringers infusion   IntraVENous Continuous Kristeen Pies,  mL/hr at 22 0333 New Bag at 22 0333    lactated ringers bolus  500 mL IntraVENous PRN Kristeen Pies, DO        Or    lactated ringers bolus  1,000 mL IntraVENous PRN Rolando Bethel, DO        sodium chloride flush 0.9 % injection 5-40 mL  5-40 mL IntraVENous 2 times per day Rolando Bethel, DO        sodium chloride flush 0.9 % injection 5-40 mL  5-40 mL IntraVENous PRN Rolando Bethel, DO        0.9 % sodium chloride infusion  25 mL IntraVENous PRN Rolando Bethel, DO        ondansetron [facility] injection 4 mg  4 mg IntraVENous Q6H PRN Rolando Bethel, DO        acetaminophen (TYLENOL) tablet 650 mg  650 mg Oral Q4H PRN Rolando Bethel, DO        penicillin G potassium in d5w IVPB 2.5 Million Units  2.5 Million Units IntraVENous Q4H Rolando Bethel, DO        witch hazel-glycerin (TUCKS) pad   Topical PRN Rolando Bethel, DO        benzocaine-menthol (DERMOPLAST) 20-0.5 % spray   Topical PRN Rolando Bethel, DO        sodium chloride 0.9 % 200 mL with fentaNYL 500 mcg, bupivacaine 0.5% 50 mL (OB) epidural                Allergies:     Allergies   Allergen Reactions    Seasonal Other (See Comments)     Sneezing, runny nose, cough       Problem List:    Patient Active Problem List   Diagnosis Code    Primigravida in third trimester Z34.03    Family history of ulcerative colitis - father Z80.78   [facility] Anxiety F41.9    Other acne L70.8    Uterine size date discrepancy pregnancy, third trimester O26.843    Group B Streptococcus carrier, +RV culture, currently pregnant O99.820    Normal labor O80, Z37.9       Past Medical History:        Diagnosis Date    Carpal tunnel syndrome during pregnancy     2022 - both hands    Psychiatric problem        Past Surgical History:        Procedure Laterality Date    WISDOM TOOTH EXTRACTION         Social History:    Social History     Tobacco Use    Smoking status: Never    Smokeless tobacco: Never   Substance Use Topics    Alcohol use: Not Currently     Comment: socially                                Counseling given: Not Answered      Vital Signs (Current):   Vitals:    07/19/22 0343 07/19/22 0346 07/19/22 0350 07/19/22 0352   BP: (!) 140/101 132/88 138/88 (!) 145/83   Pulse: 93 89 (!) 103 100   Resp: 18      Temp:       TempSrc:       SpO2:       Weight:       Height:                                                  BP Readings from Last 3 Encounters:   07/19/22 (!) 145/83   07/18/22 127/72   07/13/22 117/76       NPO Status:                                                                                 BMI:   Wt Readings from Last 3 Encounters:   07/18/22 196 lb (88.9 kg)   07/18/22 196 lb 12.8 oz (89.3 kg)   07/13/22 192 lb 9.6 oz (87.4 kg)     Body mass index is 31.64 kg/m². CBC:   Lab Results   Component Value Date/Time    WBC 15.4 07/19/2022 02:50 AM    RBC 4.18 07/19/2022 02:50 AM    HGB 13.5 07/19/2022 02:50 AM    HCT 39.3 07/19/2022 02:50 AM    MCV 93.9 07/19/2022 02:50 AM    RDW 13.1 07/19/2022 02:50 AM     07/19/2022 02:50 AM       CMP:   Lab Results   Component Value Date/Time     07/19/2022 02:50 AM    K 3.7 07/19/2022 02:50 AM     07/19/2022 02:50 AM    CO2 17 07/19/2022 02:50 AM    BUN 8 07/19/2022 02:50 AM    CREATININE <0.5 07/19/2022 02:50 AM    GFRAA >60 07/19/2022 02:50 AM    AGRATIO 1.3 07/19/2022 02:50 AM    LABGLOM >60 07/19/2022 02:50 AM    GLUCOSE 97 07/19/2022 02:50 AM    PROT 7.3 07/19/2022 02:50 AM    CALCIUM 9.5 07/19/2022 02:50 AM    BILITOT 0.3 07/19/2022 02:50 AM    ALKPHOS 173 07/19/2022 02:50 AM    AST 18 07/19/2022 02:50 AM    ALT 14 07/19/2022 02:50 AM       POC Tests: No results for input(s): POCGLU, POCNA, POCK, POCCL, POCBUN, POCHEMO, POCHCT in the last 72 hours.     Coags: No results found for: PROTIME, INR, APTT    HCG (If Applicable): No results found for: PREGTESTUR, PREGSERUM, HCG, HCGQUANT     ABGs: No results found for: PHART, PO2ART, WGZ8CZO, OQL5UPX, BEART, W0MBBEVI     Type & Screen (If Applicable):  No results found for: LABABO, LABRH    Drug/Infectious Status (If Applicable):  No results found for: HIV, HEPCAB    COVID-19 Screening (If Applicable): No results found for: COVID19        Anesthesia Evaluation  Patient summary reviewed and Nursing notes reviewed no history of anesthetic complications:   Airway: Mallampati: II  TM distance: >3 FB   Neck ROM: full  Mouth opening: > = 3 FB   Dental:          Pulmonary:Negative Pulmonary ROS and normal exam                               Cardiovascular:Negative CV ROS  Exercise tolerance: good (>4 METS),            Beta Blocker:  Not on Beta Blocker         Neuro/Psych:   (+) depression/anxiety              ROS comment: Anxiety GI/Hepatic/Renal:            ROS comment: Obesity with pregnancy; BMI-31.6. Endo/Other:                      ROS comment: GBS+ Abdominal:   (+) obese,           Vascular: Other Findings:           Anesthesia Plan      epidural     ASA 2 - emergent     (Patient request epidural for labor and delivery. Discussed procedure and risks including but not limited to changes in VSS, allergic reaction, infection, intravascular injection, paresthesia, n/v, severe headache, temporary or permanent rare neurologic sequelae and failed block. All questions answered. Patient agreed to proceed)        Anesthetic plan and risks discussed with patient and spouse. Use of blood products discussed with patient whom consented to blood products. Plan discussed with CRNA.                 Height: 5' 6\" (1.676 m), Weight: 196 lb (88.9 kg), BP: (!) 145/83, Pain 0-10: Pain Level: 9;     Lab Results   Component Value Date    WBC 15.4 (H) 07/19/2022    HGB 13.5 07/19/2022    HCT 39.3 07/19/2022    MCV 93.9 07/19/2022     07/19/2022         MIKHAIL Hardin - CRNA   7/19/2022

## 2022-07-19 NOTE — PLAN OF CARE
Problem: Pain  Goal: Verbalizes/displays adequate comfort level or baseline comfort level  Outcome: Progressing Towards Goal  Flowsheets (Taken 2022 0000)  Verbalizes/displays adequate comfort level or baseline comfort level:   Encourage patient to monitor pain and request assistance   Assess pain using appropriate pain scale   Administer analgesics based on type and severity of pain and evaluate response   Implement non-pharmacological measures as appropriate and evaluate response   Consider cultural and social influences on pain and pain management   Notify Licensed Independent Practitioner if interventions unsuccessful or patient reports new pain     Problem: Vaginal Birth or  Section  Goal: Fetal and maternal status remain reassuring during the birth process  Description:  Birth OB-Pregnancy care plan goal which identifies if the fetal and maternal status remain reassuring during the birth process  Outcome: Progressing Towards Goal     Problem: Infection - Adult  Goal: Absence of infection at discharge  Outcome: Progressing Towards Goal  Goal: Absence of infection during hospitalization  Outcome: Progressing Towards Goal  Goal: Absence of fever/infection during anticipated neutropenic period  Outcome: Progressing Towards Goal     Problem: Safety - Adult  Goal: Free from fall injury  Outcome: Progressing Towards Goal     Problem: Discharge Planning  Goal: Discharge to home or other facility with appropriate resources  Outcome: Progressing Towards Goal     Problem: Chronic Conditions and Co-morbidities  Goal: Patient's chronic conditions and co-morbidity symptoms are monitored and maintained or improved  Outcome: Progressing Towards Goal

## 2022-07-19 NOTE — ANESTHESIA PROCEDURE NOTES
Epidural Block    Patient location during procedure: OB  Start time: 7/19/2022 3:40 AM  End time: 7/19/2022 3:44 AM  Reason for block: labor epidural  Staffing  Performed: resident/CRNA   Anesthesiologist: Shavon Duke MD  Resident/CRNA: Jasmyn Enriquez APRN - CRNA  Epidural  Patient position: sitting  Prep: ChloraPrep  Patient monitoring: continuous pulse ox and frequent blood pressure checks  Approach: midline  Location: L3-4  Injection technique: CARRILLO air  Guidance: paresthesia technique  Provider prep: mask and sterile gloves  Needle  Needle type: Tuohy   Needle gauge: 17 G  Needle length: 3.5 in  Needle insertion depth: 4 cm  Catheter type: side hole  Catheter size: 19 G  Catheter at skin depth: 8 cm  Test dose: negativeCatheter Secured: tegaderm  Assessment  Sensory level: T10  Hemodynamics: stable  Attempts: 1  Outcomes: uncomplicated and patient tolerated procedure well  Additional Notes  After sterile prep and drape, Skin localized with 1% lidocaine L3-4, #17g tuohy easily placed x 1, Catheter threaded easily without hem, csf, or parasthesias. Negative test dose. Sterile dressing applied. Pt alejandra well. VSS. VASD 9-10/10 to 0/10.   Preanesthetic Checklist  Completed: patient identified, IV checked, site marked, risks and benefits discussed, equipment checked, pre-op evaluation, timeout performed, anesthesia consent given, oxygen available, monitors applied/VS acknowledged, fire risk safety assessment completed and verbalized and blood product R/B/A discussed and consented

## 2022-07-20 PROCEDURE — 59510 CESAREAN DELIVERY: CPT | Performed by: OBSTETRICS & GYNECOLOGY

## 2022-07-20 PROCEDURE — 1220000000 HC SEMI PRIVATE OB R&B

## 2022-07-20 PROCEDURE — 6360000002 HC RX W HCPCS: Performed by: NURSE ANESTHETIST, CERTIFIED REGISTERED

## 2022-07-20 PROCEDURE — 7100000000 HC PACU RECOVERY - FIRST 15 MIN: Performed by: OBSTETRICS & GYNECOLOGY

## 2022-07-20 PROCEDURE — 3700000000 HC ANESTHESIA ATTENDED CARE: Performed by: OBSTETRICS & GYNECOLOGY

## 2022-07-20 PROCEDURE — 2500000003 HC RX 250 WO HCPCS: Performed by: NURSE ANESTHETIST, CERTIFIED REGISTERED

## 2022-07-20 PROCEDURE — 3700000001 HC ADD 15 MINUTES (ANESTHESIA): Performed by: OBSTETRICS & GYNECOLOGY

## 2022-07-20 PROCEDURE — 2580000003 HC RX 258: Performed by: OBSTETRICS & GYNECOLOGY

## 2022-07-20 PROCEDURE — 7100000001 HC PACU RECOVERY - ADDTL 15 MIN: Performed by: OBSTETRICS & GYNECOLOGY

## 2022-07-20 PROCEDURE — 3609079900 HC CESAREAN SECTION: Performed by: OBSTETRICS & GYNECOLOGY

## 2022-07-20 PROCEDURE — 2709999900 HC NON-CHARGEABLE SUPPLY: Performed by: OBSTETRICS & GYNECOLOGY

## 2022-07-20 PROCEDURE — 6360000002 HC RX W HCPCS: Performed by: OBSTETRICS & GYNECOLOGY

## 2022-07-20 PROCEDURE — 6370000000 HC RX 637 (ALT 250 FOR IP): Performed by: OBSTETRICS & GYNECOLOGY

## 2022-07-20 RX ORDER — KETOROLAC TROMETHAMINE 30 MG/ML
30 INJECTION, SOLUTION INTRAMUSCULAR; INTRAVENOUS EVERY 6 HOURS
Status: DISPENSED | OUTPATIENT
Start: 2022-07-20 | End: 2022-07-21

## 2022-07-20 RX ORDER — OXYTOCIN 10 [USP'U]/ML
INJECTION, SOLUTION INTRAMUSCULAR; INTRAVENOUS PRN
Status: DISCONTINUED | OUTPATIENT
Start: 2022-07-20 | End: 2022-07-22 | Stop reason: SDUPTHER

## 2022-07-20 RX ORDER — ACETAMINOPHEN 500 MG
1000 TABLET ORAL EVERY 8 HOURS
Status: DISCONTINUED | OUTPATIENT
Start: 2022-07-20 | End: 2022-07-22 | Stop reason: HOSPADM

## 2022-07-20 RX ORDER — SODIUM CHLORIDE 0.9 % (FLUSH) 0.9 %
10 SYRINGE (ML) INJECTION EVERY 12 HOURS SCHEDULED
Status: DISCONTINUED | OUTPATIENT
Start: 2022-07-20 | End: 2022-07-20

## 2022-07-20 RX ORDER — OXYCODONE HYDROCHLORIDE 5 MG/1
10 TABLET ORAL EVERY 6 HOURS PRN
Status: DISCONTINUED | OUTPATIENT
Start: 2022-07-20 | End: 2022-07-22 | Stop reason: HOSPADM

## 2022-07-20 RX ORDER — SODIUM CHLORIDE 9 MG/ML
INJECTION, SOLUTION INTRAVENOUS PRN
Status: DISCONTINUED | OUTPATIENT
Start: 2022-07-20 | End: 2022-07-20

## 2022-07-20 RX ORDER — ONDANSETRON 2 MG/ML
4 INJECTION INTRAMUSCULAR; INTRAVENOUS EVERY 6 HOURS PRN
Status: DISCONTINUED | OUTPATIENT
Start: 2022-07-20 | End: 2022-07-22 | Stop reason: HOSPADM

## 2022-07-20 RX ORDER — POLYETHYLENE GLYCOL 3350 17 G/17G
17 POWDER, FOR SOLUTION ORAL DAILY
Status: DISCONTINUED | OUTPATIENT
Start: 2022-07-20 | End: 2022-07-22 | Stop reason: HOSPADM

## 2022-07-20 RX ORDER — SODIUM CHLORIDE, SODIUM LACTATE, POTASSIUM CHLORIDE, CALCIUM CHLORIDE 600; 310; 30; 20 MG/100ML; MG/100ML; MG/100ML; MG/100ML
INJECTION, SOLUTION INTRAVENOUS CONTINUOUS
Status: DISCONTINUED | OUTPATIENT
Start: 2022-07-20 | End: 2022-07-20

## 2022-07-20 RX ORDER — IBUPROFEN 800 MG/1
800 TABLET ORAL EVERY 8 HOURS
Status: DISCONTINUED | OUTPATIENT
Start: 2022-07-21 | End: 2022-07-22 | Stop reason: HOSPADM

## 2022-07-20 RX ORDER — SODIUM CHLORIDE 0.9 % (FLUSH) 0.9 %
10 SYRINGE (ML) INJECTION PRN
Status: DISCONTINUED | OUTPATIENT
Start: 2022-07-20 | End: 2022-07-20

## 2022-07-20 RX ORDER — SIMETHICONE 80 MG
80 TABLET,CHEWABLE ORAL EVERY 6 HOURS PRN
Status: DISCONTINUED | OUTPATIENT
Start: 2022-07-20 | End: 2022-07-22 | Stop reason: HOSPADM

## 2022-07-20 RX ORDER — SODIUM CHLORIDE 9 MG/ML
INJECTION, SOLUTION INTRAVENOUS PRN
Status: DISCONTINUED | OUTPATIENT
Start: 2022-07-20 | End: 2022-07-22 | Stop reason: HOSPADM

## 2022-07-20 RX ORDER — SODIUM CHLORIDE, SODIUM LACTATE, POTASSIUM CHLORIDE, CALCIUM CHLORIDE 600; 310; 30; 20 MG/100ML; MG/100ML; MG/100ML; MG/100ML
INJECTION, SOLUTION INTRAVENOUS CONTINUOUS
Status: DISCONTINUED | OUTPATIENT
Start: 2022-07-20 | End: 2022-07-22 | Stop reason: HOSPADM

## 2022-07-20 RX ORDER — FAMOTIDINE 10 MG/ML
INJECTION, SOLUTION INTRAVENOUS
Status: DISCONTINUED
Start: 2022-07-20 | End: 2022-07-20

## 2022-07-20 RX ORDER — TRISODIUM CITRATE DIHYDRATE AND CITRIC ACID MONOHYDRATE 500; 334 MG/5ML; MG/5ML
30 SOLUTION ORAL ONCE
Status: DISCONTINUED | OUTPATIENT
Start: 2022-07-20 | End: 2022-07-20

## 2022-07-20 RX ORDER — FAMOTIDINE 10 MG/ML
20 INJECTION, SOLUTION INTRAVENOUS 2 TIMES DAILY
Status: DISCONTINUED | OUTPATIENT
Start: 2022-07-20 | End: 2022-07-22 | Stop reason: HOSPADM

## 2022-07-20 RX ORDER — LANOLIN 100 %
OINTMENT (GRAM) TOPICAL
Status: DISCONTINUED | OUTPATIENT
Start: 2022-07-20 | End: 2022-07-22 | Stop reason: HOSPADM

## 2022-07-20 RX ORDER — SODIUM CHLORIDE 0.9 % (FLUSH) 0.9 %
5-40 SYRINGE (ML) INJECTION PRN
Status: DISCONTINUED | OUTPATIENT
Start: 2022-07-20 | End: 2022-07-22 | Stop reason: HOSPADM

## 2022-07-20 RX ORDER — DOCUSATE SODIUM 100 MG/1
100 CAPSULE, LIQUID FILLED ORAL 2 TIMES DAILY
Status: DISCONTINUED | OUTPATIENT
Start: 2022-07-20 | End: 2022-07-22 | Stop reason: HOSPADM

## 2022-07-20 RX ORDER — DIPHENHYDRAMINE HYDROCHLORIDE 50 MG/ML
25 INJECTION INTRAMUSCULAR; INTRAVENOUS EVERY 6 HOURS PRN
Status: DISCONTINUED | OUTPATIENT
Start: 2022-07-20 | End: 2022-07-22 | Stop reason: HOSPADM

## 2022-07-20 RX ORDER — MORPHINE SULFATE 1 MG/ML
INJECTION, SOLUTION EPIDURAL; INTRATHECAL; INTRAVENOUS PRN
Status: DISCONTINUED | OUTPATIENT
Start: 2022-07-20 | End: 2022-07-22 | Stop reason: SDUPTHER

## 2022-07-20 RX ORDER — LIDOCAINE HYDROCHLORIDE AND EPINEPHRINE BITARTRATE 20; .01 MG/ML; MG/ML
INJECTION, SOLUTION SUBCUTANEOUS PRN
Status: DISCONTINUED | OUTPATIENT
Start: 2022-07-20 | End: 2022-07-22 | Stop reason: SDUPTHER

## 2022-07-20 RX ORDER — AZITHROMYCIN 500 MG/1
INJECTION, POWDER, LYOPHILIZED, FOR SOLUTION INTRAVENOUS
Status: DISCONTINUED
Start: 2022-07-20 | End: 2022-07-20

## 2022-07-20 RX ORDER — OXYCODONE HYDROCHLORIDE 5 MG/1
5 TABLET ORAL EVERY 6 HOURS PRN
Status: DISCONTINUED | OUTPATIENT
Start: 2022-07-20 | End: 2022-07-22 | Stop reason: HOSPADM

## 2022-07-20 RX ORDER — SODIUM CHLORIDE 0.9 % (FLUSH) 0.9 %
5-40 SYRINGE (ML) INJECTION EVERY 12 HOURS SCHEDULED
Status: DISCONTINUED | OUTPATIENT
Start: 2022-07-20 | End: 2022-07-22 | Stop reason: HOSPADM

## 2022-07-20 RX ORDER — SODIUM CHLORIDE, SODIUM LACTATE, POTASSIUM CHLORIDE, AND CALCIUM CHLORIDE .6; .31; .03; .02 G/100ML; G/100ML; G/100ML; G/100ML
1000 INJECTION, SOLUTION INTRAVENOUS ONCE
Status: DISCONTINUED | OUTPATIENT
Start: 2022-07-20 | End: 2022-07-20

## 2022-07-20 RX ADMIN — KETOROLAC TROMETHAMINE 30 MG: 30 INJECTION, SOLUTION INTRAMUSCULAR; INTRAVENOUS at 19:40

## 2022-07-20 RX ADMIN — ACETAMINOPHEN 1000 MG: 500 TABLET ORAL at 22:34

## 2022-07-20 RX ADMIN — Medication 87.3 MILLI-UNITS/MIN: at 09:28

## 2022-07-20 RX ADMIN — SODIUM CHLORIDE, POTASSIUM CHLORIDE, SODIUM LACTATE AND CALCIUM CHLORIDE: 600; 310; 30; 20 INJECTION, SOLUTION INTRAVENOUS at 09:05

## 2022-07-20 RX ADMIN — AZITHROMYCIN MONOHYDRATE 500 MG: 500 INJECTION, POWDER, LYOPHILIZED, FOR SOLUTION INTRAVENOUS at 08:23

## 2022-07-20 RX ADMIN — OXYCODONE 10 MG: 5 TABLET ORAL at 15:24

## 2022-07-20 RX ADMIN — SODIUM CHLORIDE, POTASSIUM CHLORIDE, SODIUM LACTATE AND CALCIUM CHLORIDE: 600; 310; 30; 20 INJECTION, SOLUTION INTRAVENOUS at 08:39

## 2022-07-20 RX ADMIN — Medication 2.5 MILLION UNITS: at 06:43

## 2022-07-20 RX ADMIN — Medication 1 MILLI-UNITS/MIN: at 04:25

## 2022-07-20 RX ADMIN — LIDOCAINE HYDROCHLORIDE AND EPINEPHRINE 10 ML: 20; 10 INJECTION, SOLUTION INFILTRATION; PERINEURAL at 08:10

## 2022-07-20 RX ADMIN — OXYTOCIN 20 ML: 10 INJECTION, SOLUTION INTRAMUSCULAR; INTRAVENOUS at 08:39

## 2022-07-20 RX ADMIN — LIDOCAINE HYDROCHLORIDE AND EPINEPHRINE 5 ML: 20; 10 INJECTION, SOLUTION INFILTRATION; PERINEURAL at 08:15

## 2022-07-20 RX ADMIN — SODIUM CHLORIDE, POTASSIUM CHLORIDE, SODIUM LACTATE AND CALCIUM CHLORIDE: 600; 310; 30; 20 INJECTION, SOLUTION INTRAVENOUS at 09:28

## 2022-07-20 RX ADMIN — BUPIVACAINE HYDROCHLORIDE 7 ML: 5 INJECTION, SOLUTION EPIDURAL; INTRACAUDAL; PERINEURAL at 05:41

## 2022-07-20 RX ADMIN — MORPHINE SULFATE 3 MG: 1 INJECTION EPIDURAL; INTRATHECAL; INTRAVENOUS at 08:44

## 2022-07-20 RX ADMIN — SODIUM CHLORIDE, POTASSIUM CHLORIDE, SODIUM LACTATE AND CALCIUM CHLORIDE: 600; 310; 30; 20 INJECTION, SOLUTION INTRAVENOUS at 04:24

## 2022-07-20 RX ADMIN — Medication 2.5 MILLION UNITS: at 03:09

## 2022-07-20 RX ADMIN — CEFAZOLIN 2000 MG: 10 INJECTION, POWDER, FOR SOLUTION INTRAVENOUS at 08:19

## 2022-07-20 RX ADMIN — BUPIVACAINE HYDROCHLORIDE 7 ML: 5 INJECTION, SOLUTION EPIDURAL; INTRACAUDAL; PERINEURAL at 04:08

## 2022-07-20 RX ADMIN — KETOROLAC TROMETHAMINE 30 MG: 30 INJECTION, SOLUTION INTRAMUSCULAR; INTRAVENOUS at 11:12

## 2022-07-20 ASSESSMENT — PAIN DESCRIPTION - LOCATION
LOCATION: ABDOMEN
LOCATION: ABDOMEN
LOCATION: ABDOMEN;INCISION
LOCATION: ABDOMEN

## 2022-07-20 ASSESSMENT — PAIN SCALES - GENERAL
PAINLEVEL_OUTOF10: 7
PAINLEVEL_OUTOF10: 5
PAINLEVEL_OUTOF10: 7
PAINLEVEL_OUTOF10: 6

## 2022-07-20 ASSESSMENT — PAIN DESCRIPTION - ORIENTATION: ORIENTATION: LOWER

## 2022-07-20 ASSESSMENT — PAIN DESCRIPTION - DESCRIPTORS
DESCRIPTORS: DULL;SORE
DESCRIPTORS: ACHING
DESCRIPTORS: SORE
DESCRIPTORS: SORE

## 2022-07-20 NOTE — PROGRESS NOTES
Spoke with Dr. Abbie Fitzgerald that pt has had a few lates, moderate variability, and chris every 1 - 5 min. Pt was repositioned. Informed Dr Abbie Fitzgerald that pit was restarted at 56 and remains at 1 mu. Pt had an epidural bolus for pain 8/10 but now denies pain. No new orders noted. Dr Abbie Fitzgerald stated that Dr Clementine Miller will be in to do a cervical check this morning. Continue to monitor.

## 2022-07-20 NOTE — PLAN OF CARE
Problem: Pain  Goal: Verbalizes/displays adequate comfort level or baseline comfort level  Outcome: Progressing  Flowsheets (Taken 2022)  Verbalizes/displays adequate comfort level or baseline comfort level:   Encourage patient to monitor pain and request assistance   Assess pain using appropriate pain scale   Administer analgesics based on type and severity of pain and evaluate response   Implement non-pharmacological measures as appropriate and evaluate response   Notify Licensed Independent Practitioner if interventions unsuccessful or patient reports new pain   Consider cultural and social influences on pain and pain management     Problem: Vaginal Birth or  Section  Goal: Fetal and maternal status remain reassuring during the birth process  Description:  Birth OB-Pregnancy care plan goal which identifies if the fetal and maternal status remain reassuring during the birth process  Outcome: Progressing     Problem: Infection - Adult  Goal: Absence of infection at discharge  Outcome: Progressing  Flowsheets (Taken 2022)  Absence of infection at discharge: Assess and monitor for signs and symptoms of infection  Goal: Absence of infection during hospitalization  Outcome: Progressing  Goal: Absence of fever/infection during anticipated neutropenic period  Outcome: Progressing     Problem: Safety - Adult  Goal: Free from fall injury  Outcome: Progressing     Problem: Discharge Planning  Goal: Discharge to home or other facility with appropriate resources  Outcome: Progressing     Problem: Chronic Conditions and Co-morbidities  Goal: Patient's chronic conditions and co-morbidity symptoms are monitored and maintained or improved  Outcome: Progressing     Problem: Skin/Tissue Integrity  Goal: Absence of new skin breakdown  Description: 1. Monitor for areas of redness and/or skin breakdown  2. Assess vascular access sites hourly  3.   Every 4-6 hours minimum:  Change oxygen saturation probe

## 2022-07-20 NOTE — PLAN OF CARE
Problem: Pain  Goal: Verbalizes/displays adequate comfort level or baseline comfort level  2022 0853 by Nhung Ramirez RN  Outcome: Progressing  2022 004 by Claudene Bellows, RN  Outcome: Progressing  Flowsheets (Taken 2022)  Verbalizes/displays adequate comfort level or baseline comfort level:   Encourage patient to monitor pain and request assistance   Assess pain using appropriate pain scale   Administer analgesics based on type and severity of pain and evaluate response   Implement non-pharmacological measures as appropriate and evaluate response   Notify Licensed Independent Practitioner if interventions unsuccessful or patient reports new pain   Consider cultural and social influences on pain and pain management     Problem: Vaginal Birth or  Section  Goal: Fetal and maternal status remain reassuring during the birth process  Description:  Birth OB-Pregnancy care plan goal which identifies if the fetal and maternal status remain reassuring during the birth process  2022 0853 by Nhung Ramirez RN  Outcome: Progressing  2022 by Claudene Bellows, RN  Outcome: Progressing     Problem: Infection - Adult  Goal: Absence of infection at discharge  2022 0853 by Nhung Ramirez RN  Outcome: Progressing  2022 004 by Claudene Bellows, RN  Outcome: Progressing  Flowsheets (Taken 2022)  Absence of infection at discharge: Assess and monitor for signs and symptoms of infection  Goal: Absence of infection during hospitalization  2022 0853 by Nhung Ramirez RN  Outcome: Progressing  2022 by Claudene Bellows, RN  Outcome: Progressing  Goal: Absence of fever/infection during anticipated neutropenic period  2022 0853 by Nhung Ramirez RN  Outcome: Progressing  2022 by Claudene Bellows, RN  Outcome: Progressing     Problem: Safety - Adult  Goal: Free from fall injury  2022 0853 by Nhung Ramirez RN  Outcome: Progressing  7/20/2022 0045 by Dante Herndon RN  Outcome: Progressing     Problem: Discharge Planning  Goal: Discharge to home or other facility with appropriate resources  7/20/2022 0853 by Susan Molina RN  Outcome: Progressing  7/20/2022 0045 by Dante Herndon RN  Outcome: Progressing     Problem: Chronic Conditions and Co-morbidities  Goal: Patient's chronic conditions and co-morbidity symptoms are monitored and maintained or improved  7/20/2022 0853 by Susan Molina RN  Outcome: Progressing  7/20/2022 0045 by Dante Herndon RN  Outcome: Progressing     Problem: Skin/Tissue Integrity  Goal: Absence of new skin breakdown  Description: 1. Monitor for areas of redness and/or skin breakdown  2. Assess vascular access sites hourly  3. Every 4-6 hours minimum:  Change oxygen saturation probe site  4. Every 4-6 hours:  If on nasal continuous positive airway pressure, respiratory therapy assess nares and determine need for appliance change or resting period.   7/20/2022 0853 by Susan Molina RN  Outcome: Progressing  7/20/2022 0045 by Dante Herndon RN  Outcome: Progressing

## 2022-07-20 NOTE — PROGRESS NOTES
Discussed fetal tracing with Dr Raghavendra Dodge. Informed Dr Raghavendra Dodge that pt cervical exam at [place] [id number] remains the same 5/90/-1. Dr Raghavendra Dodge reviewed strip and aware of late decels;pt has been turned on left side, right side, and high fowlers, and is getting 2nd IV bolus. Received orders from Dr Raghavendra Dodge to continue to assess strip for 1 hour, check cervix, and if no late decels and appropriate, restart pitocin. Continue to monitor.

## 2022-07-20 NOTE — LACTATION NOTE
This note was copied from a baby's chart. Lactation Progress Note      Data:     Follow up consult for primip on DOS with an infant born at 40.2 weeks gestation. MOB reports breast feeding has been going well so far but latch is sometimes pinchy. Action: Introduced self to patient as lactation, name and phone number written on white board in room. Infant was displaying feeding cues so suggested MOB try to get infant latched. MOB agreeable. Assisted MOB get infant into football position at left breast and infant latched right on. MOB states latch is still a little pinchy. Infants top lip noted to be tucked in. Showed MOB & FOB how to flip infants lip to flange outward and MOB states latch was then very comfortable; infant had FLOR with SRS noted. Infant nursed for 20 minutes before coming off the breast. Nipple well rounded upon release. Reviewed with mother what to expect over the next  24-48 hours with infant feedings, infant output, how to know infant is getting enough, the importance of a deep latch and how to achieve it, how to break suction and try again if latch is shallow, normal  behavior, how to wake a sleepy infant to feed, how the breasts work to make milk, protecting milk supply, breastfeeding recommendations for exclusivity and duration, what to expect with cluster feeding, and breast care. Educated mother on how to hand express colostrum. Reviewed infant feeding cues and encouraged mother to allow infant to breast feed on demand anytime feeding cues are shown and if no feeding cues are shown to attempt to wake infant to feed every 2-3 hours. If infant is still too sleepy to latch to hand express colostrum into infants mouth for about ten minutes, then try again in 2-3 hours. After the first day of life to breast feed a minimum of 8-12 times a day per 24 hour period.  Also encouraged mother to avoid giving infant a pacifier, bottle, or pump for at least the first two weeks of life or until breast feeding is well established. Encouraged good hydration, nutrition, and rest, and to keep taking prenatal vitamin while lactating. Encouraged much skin to skin between mother and infant and father and infant. Breast feeding log reviewed, all questions answered. Mother instructed to call lactation for F/U care as needed. Response: MOB please with feed & verbalized an understanding of education provided and will call for assistance as needed.

## 2022-07-20 NOTE — PROGRESS NOTES
Labor Progress Note  [facility]    Subjective:   Patient is seen and examined. Is comfortable with epidural.       Objective:  /78   Pulse 78   Temp 98.9 °F (37.2 °C) (Oral)   Resp 16   Ht 5' 6\" (1.676 m)   Wt 196 lb (88.9 kg)   LMP 10/11/2021 (Approximate)   SpO2 99%   BMI 31.64 kg/m²     Cervix:  Dilation (cm): 5  Effacement: 90  Cervical Characteristics: Mid-Position  Station: -1  Presentation: Vertex  OB Examiner: Amy Butler    FHTs: 135 baseline, mod domi, +absent, late decelerations with approximately 50% of contractions   [place]: q2-5 min    Infusions:    lactated ringers 125 mL/hr at 07/20/22 0424    sodium chloride      oxytocin 1 yuniel-units/min (07/20/22 0700)     ? Assessment/Plan:  1. Intrapartum  - FHTs persistent category 2 throughout the night, overall reassuring given moderate variability and periods of accelerations. However, pitocin has not been able to be increased higher than 1-2 mU/min due to late decelerations with administration of pitocin. FHR has been resuscitative with position changes, IV fluid bolus and decreasing pitocin. - Cervix has remained unchanged for approx 12 hours  - Risks, benefits and alternatives were reviewed with the patient. All questions were answered to the patient's satisfaction. Consents are signed and in chart.     - Proceed to OR for PLTCS for fetal intolerance to labor    Jan Bhardwaj DO

## 2022-07-20 NOTE — PROGRESS NOTES
This RN reviewed all documentation and supervised all care done by Fabricio Molina RN.   Latosha Mendez RN

## 2022-07-20 NOTE — L&D DELIVERY NOTE
in chart. Procedure Details   The patient was taken to the operating room where epidural anesthesia from labor was dosed and found to be adequate. Patient was placed on the operating table in the dorsal supine position with a leftward tilt. Patient was prepped and draped in the normal sterile fashion including a vaginal prep. A universal time out was performed and all parties agreed to the patient and procedure information. Patient received Ancef and Azithromycin for antibiotic prophylaxis. A Pfannenstiel incision was created with a scalpel and carried down to the fascia. The fascia was incised in the midline and extended in a curvilinear fashion with Hickman scissors. The superior fascial reflection was grasped with Kocher clamps and the underlying rectus muscles were dissected off bluntly and sharply using Hickman scissors. The inferior fascial reflection was then grasped using Kocher clamps and the underlying rectus muscles were dissected off bluntly and sharply to the level of the pubic bone. The peritoneum was identified and and entered bluntly. The peritoneal incision was extended using manual traction. The bladder blade was placed to keep the bladder out of the operative field. The vesicouterine peritoneum was identified and bladder flap was created with Metzenbaum scissors. The bladder was mobilized in a caudad fashion using blunt dissection and the bladder blade was replaced in the bladder flap. The lower uterine segment was identified and incised in a curvilinear fashion using a scalpel. The uterine incision was extended using manual traction. The surgeons hand was placed in the incision and the infants head was elevated to the level of the incision. Bladder blade was removed and the infant was delivered atraumatically in the occiput posterior position using gentle fundal pressure. The infant was noted to have good tone and spontaneous cry. The mouth and nose were bulb suctioned.   The cord was clamped and cut and the infant was handed off to awaiting nursing staff. Cord segment was taken for cord gasses and later discarded due to infant status. Cord blood collected. The placenta was delivered intact with a 3 vessel cord using fundal massage and gentle cord traction. The uterus was exteriorized and cleared of all clots and debris. The uterine incision was re-approximated using 0 Vicryl suture in a running locked fashion. A second imbricating layer was placed using 0-Vicryl with excellent hemostasis. The posterior cul-de-sac was cleared of all clot and debris. The uterine incision was inspected and noted to be hemostatic. The uterus, tubes and ovaries were returned to the abdomen. Bilateral gutters were cleared of all clot and debris. The uterine incision was inspected with excellent hemostasis noted. The peritoneum was reapproximated in a running fashion using 2-0 Vicryl suture. The undersurface of the fascia and rectus muscles were inspected and excellent hemostasis was noted. The fascia was re-approximated using 1-Vicryl suture in a running fashion. Hemostats and bovie cautery were utilized in the subcutaneous tissue to provide excellent hemostasis of the perforating vessels. The subcutaneous tissue was re-approximated in a running fashion using 2-0 Vicryl suture. The skin was closed in a running fashion using 3-0 Monocryl. Steri strips and a sterile bandage was applied. Using fundal pressure a moderate amount of clots were removed from the lower uterine segment and vagina. All sponge, lap and needle counts were noted to be correct. The patient tolerated the procedure well. She was taken to her postpartum room in stable condition. Attending Attestation: I performed the procedure.     Von Cummins DO

## 2022-07-20 NOTE — LACTATION NOTE
This note was copied from a baby's chart. Lactation Progress Note      Data:   RN requesting [facility] assistance with first breast feed after c/section delivery. Baby currently skin to skin and showing feeding readiness cues. Action: Assisted with good position at breast. Colostrum expressed to encourage feed. Baby rooting and a good deep comfortable latch was achieved after few attempts with SRS and AS. Breast feeding education initiated. Encouraged to allow baby to go to breast ad jonathan and stressed the importance of always achieving a good deep comfortable latch. Offered f/u LC support prn. Discouraged paci, bottles and pumping while baby is establishing a good milk supply. Encouraged good hydration and nutrition when able after c/sec delivery. [facility] number on board for f/u. Response: Pleased with first breast feed. Verbalized understanding but will need f/u due to maternal fatigue.

## 2022-07-20 NOTE — PROGRESS NOTES
Dr. Dejuan Arauz updated on pt's status including FHR tracing with moderate variability and subtle late decels x 2. Pitocin remains at 3 milliunits/minute. Verified orders to have house officer AROM pt if no cervical change at 2030 and to insert IUPC.

## 2022-07-20 NOTE — PROGRESS NOTES
Epi cath removed with tip viewed. Assisted up to bathroom by 2. Steady gait. Elizabeth care and chopra care done. New pad and underwear on. Gown changed. Assisted to chair. Comfort pad placed with linens changed. Pt remains up in chair per her request.  Call light in reach. Educated to call when ready to get back to bed.

## 2022-07-20 NOTE — PROGRESS NOTES
Informed Dr Gema Antonio cervix is 5/90/-1, no change. Informed Gema Antonio that pt has had 3-4 lates in last hour with 2 in the last 30 min. Pt continues to contract approximately every 2 - 5 min. Received orders to restart pitocin. Dr Gema Antonio stated that if pt starts having lates, will consider c/s. Discussed plan with pt. Pt listening, asking questions, and agrees to restart pitocin. Pt aware of possible c/s and agrees.

## 2022-07-21 LAB
A/G RATIO: 1 (ref 1.1–2.2)
ALBUMIN SERPL-MCNC: 3.2 G/DL (ref 3.4–5)
ALP BLD-CCNC: 145 U/L (ref 40–129)
ALT SERPL-CCNC: 13 U/L (ref 10–40)
ANION GAP SERPL CALCULATED.3IONS-SCNC: 11 MMOL/L (ref 3–16)
AST SERPL-CCNC: 28 U/L (ref 15–37)
BASOPHILS ABSOLUTE: 0.1 K/UL (ref 0–0.2)
BASOPHILS RELATIVE PERCENT: 0.6 %
BILIRUB SERPL-MCNC: <0.2 MG/DL (ref 0–1)
BUN BLDV-MCNC: 7 MG/DL (ref 7–20)
CALCIUM SERPL-MCNC: 9.3 MG/DL (ref 8.3–10.6)
CHLORIDE BLD-SCNC: 104 MMOL/L (ref 99–110)
CO2: 22 MMOL/L (ref 21–32)
CREAT SERPL-MCNC: <0.5 MG/DL (ref 0.6–1.1)
EOSINOPHILS ABSOLUTE: 0.1 K/UL (ref 0–0.6)
EOSINOPHILS RELATIVE PERCENT: 0.9 %
GFR AFRICAN AMERICAN: >60
GFR NON-AFRICAN AMERICAN: >60
GLUCOSE BLD-MCNC: 86 MG/DL (ref 70–99)
HCT VFR BLD CALC: 35.6 % (ref 36–48)
HEMOGLOBIN: 12 G/DL (ref 12–16)
LYMPHOCYTES ABSOLUTE: 2.3 K/UL (ref 1–5.1)
LYMPHOCYTES RELATIVE PERCENT: 20.1 %
MCH RBC QN AUTO: 32.1 PG (ref 26–34)
MCHC RBC AUTO-ENTMCNC: 33.7 G/DL (ref 31–36)
MCV RBC AUTO: 95.1 FL (ref 80–100)
MONOCYTES ABSOLUTE: 0.7 K/UL (ref 0–1.3)
MONOCYTES RELATIVE PERCENT: 6.6 %
NEUTROPHILS ABSOLUTE: 8.1 K/UL (ref 1.7–7.7)
NEUTROPHILS RELATIVE PERCENT: 71.8 %
PDW BLD-RTO: 13.3 % (ref 12.4–15.4)
PLATELET # BLD: 202 K/UL (ref 135–450)
PMV BLD AUTO: 8.2 FL (ref 5–10.5)
POTASSIUM SERPL-SCNC: 4.1 MMOL/L (ref 3.5–5.1)
RBC # BLD: 3.74 M/UL (ref 4–5.2)
SODIUM BLD-SCNC: 137 MMOL/L (ref 136–145)
TOTAL PROTEIN: 6.5 G/DL (ref 6.4–8.2)
WBC # BLD: 11.3 K/UL (ref 4–11)

## 2022-07-21 PROCEDURE — 2580000003 HC RX 258: Performed by: OBSTETRICS & GYNECOLOGY

## 2022-07-21 PROCEDURE — 6360000002 HC RX W HCPCS: Performed by: OBSTETRICS & GYNECOLOGY

## 2022-07-21 PROCEDURE — 85025 COMPLETE CBC W/AUTO DIFF WBC: CPT

## 2022-07-21 PROCEDURE — 80053 COMPREHEN METABOLIC PANEL: CPT

## 2022-07-21 PROCEDURE — 2500000003 HC RX 250 WO HCPCS: Performed by: OBSTETRICS & GYNECOLOGY

## 2022-07-21 PROCEDURE — 99024 POSTOP FOLLOW-UP VISIT: CPT | Performed by: OBSTETRICS & GYNECOLOGY

## 2022-07-21 PROCEDURE — 6370000000 HC RX 637 (ALT 250 FOR IP): Performed by: OBSTETRICS & GYNECOLOGY

## 2022-07-21 PROCEDURE — 36415 COLL VENOUS BLD VENIPUNCTURE: CPT

## 2022-07-21 PROCEDURE — 1220000000 HC SEMI PRIVATE OB R&B

## 2022-07-21 RX ADMIN — MICONAZOLE NITRATE: 2 POWDER TOPICAL at 17:33

## 2022-07-21 RX ADMIN — SODIUM CHLORIDE, PRESERVATIVE FREE 10 ML: 5 INJECTION INTRAVENOUS at 09:00

## 2022-07-21 RX ADMIN — DOCUSATE SODIUM 100 MG: 100 CAPSULE, LIQUID FILLED ORAL at 12:54

## 2022-07-21 RX ADMIN — ACETAMINOPHEN 1000 MG: 500 TABLET ORAL at 17:24

## 2022-07-21 RX ADMIN — KETOROLAC TROMETHAMINE 30 MG: 30 INJECTION, SOLUTION INTRAMUSCULAR; INTRAVENOUS at 02:54

## 2022-07-21 RX ADMIN — IBUPROFEN 800 MG: 800 TABLET, FILM COATED ORAL at 21:11

## 2022-07-21 RX ADMIN — POLYETHYLENE GLYCOL 3350 17 G: 17 POWDER, FOR SOLUTION ORAL at 12:54

## 2022-07-21 RX ADMIN — ACETAMINOPHEN 1000 MG: 500 TABLET ORAL at 06:23

## 2022-07-21 RX ADMIN — IBUPROFEN 800 MG: 800 TABLET, FILM COATED ORAL at 12:55

## 2022-07-21 RX ADMIN — DOCUSATE SODIUM 100 MG: 100 CAPSULE, LIQUID FILLED ORAL at 21:10

## 2022-07-21 ASSESSMENT — PAIN DESCRIPTION - DESCRIPTORS
DESCRIPTORS: ACHING
DESCRIPTORS: SORE
DESCRIPTORS: DISCOMFORT
DESCRIPTORS: DISCOMFORT;DULL
DESCRIPTORS: BURNING

## 2022-07-21 ASSESSMENT — PAIN DESCRIPTION - LOCATION
LOCATION: ABDOMEN;INCISION
LOCATION: INCISION
LOCATION: ABDOMEN
LOCATION: ABDOMEN;INCISION
LOCATION: ABDOMEN

## 2022-07-21 ASSESSMENT — PAIN SCALES - GENERAL
PAINLEVEL_OUTOF10: 5
PAINLEVEL_OUTOF10: 7
PAINLEVEL_OUTOF10: 6
PAINLEVEL_OUTOF10: 6

## 2022-07-21 ASSESSMENT — PAIN - FUNCTIONAL ASSESSMENT: PAIN_FUNCTIONAL_ASSESSMENT: ACTIVITIES ARE NOT PREVENTED

## 2022-07-21 NOTE — PROGRESS NOTES
[facility] Ob/Gyn  Post Partum Progress Note    Subjective:   Patient is a 23 y.o. y/o  female S/P PLTCS at 40w2d EGA. PPD # 1. The pregnancy was complicated by: elevated BP on admission, fetal intolerance in labor. Doing well today. No current complaints are noted including headache, change in vision, fever, chills, chest pain, shortness of breath, nausea, vomiting, diarrhea or constipation. The patient denies dizziness with ambulation or calf tenderness. Voiding spontaneously. Lochia is described as mild. The patient plans to breastfeed. Objective:   GENERAL APPEARANCE: alert, well appearing, in no apparent distress  LUNGS: Resp effort normal   HEART: Reg rate   ABDOMEN POSTPARTUM: benign non-tender, without masses or organomegaly palpable . Uterine Fundus firm, NT, Below umbilicus. Dressing in place / Incision Dry/Clean/Intact.     EXTREMITIES: no redness or tenderness in the calves or thighs, no edema  SKIN: normal coloration and turgor, no rashes, no suspicious skin lesions noted    Pertinent Labs:   H/H: 13.5/39.3 > 12.0/35.6    Assessment / Plan:  1) Post partum    - meeting post partum milestones    - hemodynamically stable     2) A pos / RI / GBS pos    3) Baby Information    - Delivery Time / Date: 2022 0838   - APGARS: 8, 9    - Birth weight: 7 lb 4.4 oz (3.3 kg   - Feeding: breast    - Gender: male - s/p circ     Disposition:   Post Partum Instructions reviewed   Anticipate discharge on PPD # 2-3 pending clinical status     Catarina Raza DO

## 2022-07-21 NOTE — LACTATION NOTE
This note was copied from a baby's chart. Lactation Progress Note      Data:     F/u during lactation rounds with primip breast feeder, 1 po. MOB c/o soreness to right nipple, and bleeding after feeding earlier today, states doing better with feedings as the day progressed and was able to obtain deeper latching. Observed infant offering the left breast on consult, infant rooting with wide open mouth, FLOR achieved with SRS and AS. Action: Introduced self as  [address] on and offered much support. Reassured of FLOR observed at the breast on consult. Educated on importance of deep latch, tips to achieve, and how a good latch should look and feel. Instructed how to break the suction of the latch if ever shallow, pinching or painful. Instructed to notice nipple shape, explaining that nipple should be rounded without creasing with release. Reviewed care of sore, cracked nipple. Provided hydrogel pads and lanolin and instructed on use. Breast feeding education reviewed including breast care, size of infant's tummy, types of milk mom will produce, how milk production works, when to expect mature milk supply, expected  feeding behaviors during the first 24-48 hours of life, and how to know infant is getting enough at the breast including appropriate feedings, output, and weight trends. Encouraged much STS, offering the breast exclusively, when infant is first begining to wake and show hunger cues, and every 2-3 hours if baby is sleepy and without cues. Gave tips to wake sleepy baby as needed. Encouraged much STS and hand expression of colostrum when offering the breast. Educated on normalcy of cluster feeding behaviors and what to expect as well as the important role they play on bringing in and establishing a good milk supply. Reviewed risks related to pacifiers, artificial nipples, and formula supplements when given without medical indication.  Encouraged exclusive breast feeding, waiting 4 weeks to pump, offer bottles of EBM or pacifiers, unless medical indication were to arise. Name and number provided on whiteboard. Encouraged to call for f/u support prn. Response: Verbalized understanding of teaching provided. Comfortable with deeper latching this afternoon. Will call for f/u support prn.

## 2022-07-21 NOTE — ANESTHESIA POSTPROCEDURE EVALUATION
Department of Anesthesiology  Postprocedure Note    Patient: Myla Wade  MRN: [id number]  YOB: 2000  Date of evaluation: 7/21/2022      Procedure Summary     Date: 07/19/22 Room / Location:     Anesthesia Start: 0332 Anesthesia Stop:     Procedure: Labor Analgesia Diagnosis:     Scheduled Providers:  Responsible Provider: Jennifer Thompson MD    Anesthesia Type: epidural ASA Status: 2 - Emergent          Anesthesia Type: No value filed.     Eliu Phase I: Eliu Score: 9    Eliu Phase II: Eliu Score: 9      Anesthesia Post Evaluation    Patient location during evaluation: bedside  Patient participation: complete - patient participated  Level of consciousness: awake and alert  Airway patency: patent  Nausea & Vomiting: no nausea and no vomiting  Complications: no  Cardiovascular status: blood pressure returned to baseline  Respiratory status: acceptable  Hydration status: stable

## 2022-07-22 VITALS
DIASTOLIC BLOOD PRESSURE: 70 MMHG | RESPIRATION RATE: 16 BRPM | HEIGHT: 66 IN | HEART RATE: 90 BPM | SYSTOLIC BLOOD PRESSURE: 125 MMHG | OXYGEN SATURATION: 97 % | BODY MASS INDEX: 31.5 KG/M2 | WEIGHT: 196 LBS | TEMPERATURE: 97.6 F

## 2022-07-22 PROCEDURE — 99024 POSTOP FOLLOW-UP VISIT: CPT | Performed by: OBSTETRICS & GYNECOLOGY

## 2022-07-22 PROCEDURE — 6370000000 HC RX 637 (ALT 250 FOR IP): Performed by: OBSTETRICS & GYNECOLOGY

## 2022-07-22 RX ORDER — IBUPROFEN 800 MG/1
800 TABLET ORAL EVERY 8 HOURS
Qty: 120 TABLET | Refills: 3 | Status: SHIPPED | OUTPATIENT
Start: 2022-07-22

## 2022-07-22 RX ORDER — OXYCODONE HYDROCHLORIDE AND ACETAMINOPHEN 5; 325 MG/1; MG/1
1 TABLET ORAL EVERY 6 HOURS PRN
Qty: 28 TABLET | Refills: 0 | Status: SHIPPED | OUTPATIENT
Start: 2022-07-22 | End: 2022-07-29

## 2022-07-22 RX ORDER — PSEUDOEPHEDRINE HCL 30 MG
100 TABLET ORAL 2 TIMES DAILY
Qty: 60 CAPSULE | Refills: 0 | Status: SHIPPED | OUTPATIENT
Start: 2022-07-22 | End: 2022-08-04

## 2022-07-22 RX ADMIN — IBUPROFEN 800 MG: 800 TABLET, FILM COATED ORAL at 05:37

## 2022-07-22 RX ADMIN — POLYETHYLENE GLYCOL 3350 17 G: 17 POWDER, FOR SOLUTION ORAL at 09:07

## 2022-07-22 RX ADMIN — ACETAMINOPHEN 1000 MG: 500 TABLET ORAL at 01:14

## 2022-07-22 RX ADMIN — DOCUSATE SODIUM 100 MG: 100 CAPSULE, LIQUID FILLED ORAL at 09:07

## 2022-07-22 RX ADMIN — ACETAMINOPHEN 1000 MG: 500 TABLET ORAL at 09:07

## 2022-07-22 ASSESSMENT — PAIN DESCRIPTION - ORIENTATION: ORIENTATION: LOWER

## 2022-07-22 ASSESSMENT — PAIN DESCRIPTION - LOCATION
LOCATION: ABDOMEN;INCISION

## 2022-07-22 ASSESSMENT — PAIN DESCRIPTION - DESCRIPTORS
DESCRIPTORS: CRAMPING;SORE
DESCRIPTORS: CRAMPING;DISCOMFORT;DULL;SORE

## 2022-07-22 ASSESSMENT — PAIN - FUNCTIONAL ASSESSMENT
PAIN_FUNCTIONAL_ASSESSMENT: ACTIVITIES ARE NOT PREVENTED

## 2022-07-22 ASSESSMENT — PAIN SCALES - GENERAL
PAINLEVEL_OUTOF10: 5
PAINLEVEL_OUTOF10: 6
PAINLEVEL_OUTOF10: 8

## 2022-07-22 NOTE — PROGRESS NOTES
Discahrge instructions reviewed with patient, all questions answered at bedside. Patient handed prescriptions for colace, percocet, and mortin. Patient denies any further needs at this time.

## 2022-07-22 NOTE — PROGRESS NOTES
Discharge Phone Call    Patient Name: Félix Wilson     [facility] Care Provider: Ant Wagner DO Discharge Date: 2022    Disposition of baby:    Phone Number: [phone] (home)     Attempts to Contact:  Date:    Caller  Date:    Caller  Date:    Caller    Information for the patient's : Dayne Dio, Baby Antione Borja [[id number]]   Delivery Method: , Low Transverse     1. Now that you are at home is your pain being well controlled? Y/N   If no, instruct to call       provider. 2. Are you breastfeeding? Y/N    Do you need any extra support from our lactation staff? Y/N    If yes, provide number for lactation. 3. Have you made or already had your first appointment with the baby's doctor? Y/N   If no, do      you know when to schedule it? Y/N    4. Have you scheduled your follow-up appointment? Y/N  If no, do you know when to schedule       it? Y/N   If no, they can find it on printed discharge instructions. 5. Did staff discuss safe sleep during your stay? Y/N   6. Did we explain things in a way you could understand? Y/N  7. Were we respectful of your preferences for labor and birth and include you in the plan of       care? Y/N  If no, please explain _______________________________________________  8. Is there anyone in particular you would like to mention who provided care for you? _______      _________________________________________________________________________     9. Were you given a Post-Birth Warning Signs handout? Y/N  Do you have it somewhere      easily accessible? Y/N  If no, please send them a copy and ask them to put it somewhere      easily found. 10. Have you been crying excessively, having anger or mood swings that feel out of control, or       feel like you can't cope with caring for yourself or baby? Y/N   If yes, they may be showing       signs of postpartum depression and should call provider.  There is also a        depression test on page C5 in their discharge booklet they can take. 13. Do you have any other questions or concerns I can address today?  Y/N  ______________      _________________________________________________________________________    Information provided during call :_________________________________________________  ___________________________________________________________________________    Call completed by:____________________________    Date:_________ Time:___________

## 2022-07-22 NOTE — PROGRESS NOTES
[facility] Ob/Gyn  Post Partum Progress Note    Subjective:   Cristy Apodaca is a 23 y. o. s/p PLTCS at 40w2d, POD #2. Pregnancy complicated by fetal intolerance to labor, GBS positive and elevated BP on L&D admission. Patient is doing well today without complaints. Reports lochia is decreasing. Pain is well controlled. Tolerating regular diet without nausea or vomiting. Ambulating without difficulty, denies dizziness on standing. Voiding without difficulty. Denies headache, vision changes, RUQ pain. Denies chest pain, shortness of breath, fever, chills, calf pain. Postpartum Depression: Low Risk     Last EPDS Total Score: 0    Last EPDS Self Harm Result: Never        Objective:   Vitals:    22 0118   BP: 130/73   Pulse: 90   Resp: 16   Temp:    SpO2: 97%      GENERAL APPEARANCE: alert, well appearing, in no apparent distress  LUNGS: clear to auscultation, no wheezes, rales or rhonchi, symmetric air entry  HEART: regular rate and rhythm, no murmurs  ABDOMEN POSTPARTUM: Soft, non-tender to palpation, non-distended. No rebound or guarding. Fundus firm and non-tender to palpation. Incision with steri-strips in place without evidence of infection. EXTREMITIES: no redness or tenderness in the calves or thighs, trace bilateral edema  SKIN: normal coloration and turgor, no rashes  NEUROLOGIC: alert, oriented, normal speech, no focal findings or movement disorder noted. Patellar reflexes 2+, no clonus.      Impression: S/P     Pertinent Labs:   Lab Results   Component Value Date    WBC 11.3 (H) 2022    HGB 12.0 2022    HCT 35.6 (L) 2022    MCV 95.1 2022     2022        Lab Results   Component Value Date     2022    K 4.1 2022     2022    CO2 22 2022    BUN 7 2022    CREATININE <0.5 (L) 2022    GLUCOSE 86 2022    CALCIUM 9.3 2022    PROT 6.5 2022    LABALBU 3.2 (L) 2022 BILITOT <0.2 2022    ALKPHOS 145 (H) 2022    AST 28 2022    ALT 13 2022    LABGLOM >60 2022    GFRAA >60 2022    AGRATIO 1.0 (L) 2022       Assessment / Plan:  Gurjit Roper is a 23 y.o.  s/p PLTCS at 40w2d     1. POD #2  - Doing well, meeting milestones  - Continue routine care - see orders    2. A pos / RI / GBS pos     3. Elevated BP on admission  - Stable PP - normotensive  - UPC too low to calculate  - Asymptomatic  - Will continue to follow    4. Breast feeding  - Lactation support as needed    5.  Infant information  - Delivery date/time: 2022 at 3873  - Gender: Male  - Weight: 7lbs 4.4oz  - Apgars: 8 & 9   - s/p Circumcision POD #1    Disposition:   Stable for discharge home  Discharge instructions reviewed   Follow Up in the office in 1 weeks  Given Rx for Ibuprofen and Percocet for pain   Given Rx for Colace for constipation    Yuli Henderson DO

## 2022-07-22 NOTE — DISCHARGE SUMMARY
[facility] Ob/Gyn  Obstetric Discharge Summary        Admitting Diagnosis:   1. Mayito Alfredo is a 23 y.o. Katharyn Dacia at 40w2d   2. Spontaneous labor  3. Elevated BP on admission  4. A pos / RI / GBS pos    Discharge Diagnosis:  1. Mayito Alfredo is a 23 y.o. Katharyn Dacia at 40w2d s/p PLTCS  2. Spontaneous labor  3. Elevated BP on admission  4. A pos / RI / GBS pos    Procedures:   1. Primary low transverse  delivery    Referrals:    - Lactation Consultant      Information:   - Delivery date/time: 2022 at 6795  - Gender: Male  - Weight: 7lbs 4.4oz  - Apgars: 8 & 9  - s/p Circumcision POD #1    Discharge Instructions:  Please call for increased pain not controlled by pain medications, significant vaginal bleeding greater than 1 pad/hour, temperature greater than 101 degrees F or any other concerns. Plan to follow up in 1 week. Diet:common adult    Activity:  Increase activity gradually. No heavy lifting or driving for 2 weeks. Pelvic rest for 6 weeks. No intercourse, tampons, douching, baths.        Medications:   - Motrin    - Percocet   - Colace     Disposition: Home    Condition on discharge: Stable    Follow up: in 1 week(s)    Faustina Martino DO

## 2022-07-22 NOTE — DISCHARGE INSTRUCTIONS
Thank you for the opportunity to care for you and your family. We hope that you are happy with the care we provided during your stay in the [facility]. We want to ensure that you have the help that you need when you leave the hospital. If there is anything that we can assist you with please let us know. Breastfeeding mothers may contact our lactation specialists with any problems or questions. [facility] lactation services phone number is [phone]. Leave a message and your call will be returned. Please refer to the information provided in the \"Caring for Yourself\" tab in your discharge binder (Guidelines for [employer]). The following are warning signs to remember. Call 911 if you have:    Chest pain or pressure  Shortness of breath, even at rest  Thoughts of hurting yourself or your baby  Seizures    Call your healthcare provider if you have:    Temperature of 100.4 degrees or higher  Stitches that are not healing             --Swelling, bleeding, drainage, foul odor, redness or warmth in/around your                 stitches, staples, or incision (scar)               -- Bad smelling blood or discharge from the vagina  Vaginal bleeding that has increased             --Soaking through one pad in an hour             --You are passing clots larger than the size of a lemon. Red, warm tender area(s) in your breast or calf. Headache that does not get better, even after taking medicine; or headache with vision changes    Remember to notify all healthcare providers from your date of delivery up to one year after birth! CARING FOR YOURSELF      DIET/ACTIVITY    Eat a well balanced diet focusing on food high in fiber and protein. Drink plenty of fluids, especially water. To avoid constipation you may take a mild stool softener as recommended by your doctor or midwife. Gradually increase your activity.  Resume an exercise regime only after being advised by your doctor or midwife. When sitting or lying down, keep your legs elevated to reduce swelling. Avoid lifting anything heavier than your baby or a gallon of milk. Avoid driving for two weeks or while taking narcotics. No sexual intercourse for 6 weeks, or until advised by your OB provider. Nothing in the vagina: intercourse, tampons or douching. Be prepared to discuss family planning at your follow up OB visit. If you feel tired and have a lack of energy, you may continue to take your prenatal vitamins. Nap when your baby naps to catch up on sleep. EMOTIONS    You may feel alaniz, sad, teary and overwhelmed. Contact your OB provider if you think you may be showing signs of postpartum depression. Please refer to the \"Going Home\" tab in your discharge binder. If your baby will not stop crying, contact another adult to help or place the baby in its crib on its back and take a break. Never shake your baby! INCISIONAL/FANTASMA CARE    Clean your incision in the shower with mild soap. After shower pat the incision area dry and allow it to be open to the air. If used, steri strips should be removed by 2 weeks. If you are discharged with staples in place, call your OB provider's office to schedule removal.  Vaginal bleeding will decrease in amount over the next few weeks. Cleanse your perineum from front to back using the fantasma bottle, after toileting, until bleeding stops. You will notice that as your activity increases, your flow may also increase. This is a sign that you need to rest more often. Call your OB provider if you are saturating more than one maxi pad in an hour and rest does not help. BREAST CARE    FOR BREASTFEEDING MOMS:    If you become engorged, feeding may be more difficult or painful for 1 to 2 days. You may find it helpful to hand express some milk so that the infant can latch more easily. While breastfeeding continue to take your prenatal vitamins as directed.   Refer to the breastfeeding information in the discharge binder. FOR NON-BREASTFEEDING MOMS:    You may apply ice packs to your breasts, over your bra, for 20 minutes at a time for comfort. Do not express breast milk. Avoid stimulation to your breasts. When showering, allow the water to strike only your back. Wear a good fitting bra, such as a sports bra, until your milk dries up. OTHER REASONS TO CALL YOUR DOCTOR    Your abdomen is tender to touch or you have pain that cannot be relieved. Flu-like symptoms such as achy muscles and joints or you are experiencing extreme weakness or dizziness. Persistent burning or increased urgency in urination. LITERATURE PROVIDED    For Moms and Those who Care about Them. Your [employer], [address]. Breastfeeding Best for [employer], Best for Mom. [address] Information about Universal Hearing Screening. Controlling Pain. I have received the educational material listed above,  The  Channel has provided me with the opportunity to view instructional videos pertaining to infant care and the care of myself. I verify that I have received the above information and have no further questions and feel confident to care for myself and my baby. For more information about postpartum care or baby care and feeding, create a [facility] My Chart account, sign in and search using the magnifying glass and type in postpartum, breastfeeding or formula feeding. [email or web address]

## 2022-07-22 NOTE — PLAN OF CARE
Problem: Pain  Goal: Verbalizes/displays adequate comfort level or baseline comfort level  Outcome: Progressing     Problem: Vaginal Birth or  Section  Goal: Fetal and maternal status remain reassuring during the birth process  Description:  Birth OB-Pregnancy care plan goal which identifies if the fetal and maternal status remain reassuring during the birth process  Outcome: Progressing     Problem: Infection - Adult  Goal: Absence of infection at discharge  Outcome: Progressing  Goal: Absence of infection during hospitalization  Outcome: Progressing  Goal: Absence of fever/infection during anticipated neutropenic period  Outcome: Progressing     Problem: Safety - Adult  Goal: Free from fall injury  Outcome: Progressing     Problem: Discharge Planning  Goal: Discharge to home or other facility with appropriate resources  Outcome: Progressing     Problem: Chronic Conditions and Co-morbidities  Goal: Patient's chronic conditions and co-morbidity symptoms are monitored and maintained or improved  Outcome: Progressing     Problem: Skin/Tissue Integrity  Goal: Absence of new skin breakdown  Description: 1. Monitor for areas of redness and/or skin breakdown  2. Assess vascular access sites hourly  3. Every 4-6 hours minimum:  Change oxygen saturation probe site  4. Every 4-6 hours:  If on nasal continuous positive airway pressure, respiratory therapy assess nares and determine need for appliance change or resting period.   Outcome: Progressing

## 2022-07-22 NOTE — LACTATION NOTE
This note was copied from a baby's chart. Lactation Progress Note      Data:  F/u with during lactation rounds with primip breast feeder, 2 po. Mother reports latching is much more comfortable today and nipples are healing. Reports doing well and feels comfortable with breast feeding and discharge home. Infant at 4% weight loss, with good output and feedings. Action:  Introduced self as [address] on for the day and offered support. Reviewed breast feeding guide booklet for discharge teaching. Educated on benefits of exclusive breast feeding, how milk production works, and tips to encourage and protect a good milk supply during breast feeding relationship. Education provided on the importance of obtaining a good deep comfortable latch and gave tips to achieve. Explained how a good latch should look and feel, and the importance to break the suction of the latch if shallow, pinching or painful. Discharge breast feeding education reviewed including breast care, changes to milk supply and prevention/treatment of engorgement, reviewed signs of hunger/satiety, size of infant's stomach, expected  feeding behaviors, and how to know baby is getting enough at the breast including daily goals for infant feedings, output, and anticipated weight trends. Encouraged to offer the breast when infant first begins rooting, and every 2-3 hours if baby is sleepy and without feeding cues. Instructed that baby should have a minimum of 8-12 good feedings in a 24 hour period after the first DOL. Explained what to expect with cluster feeding behaviors and growth spurts, and the important role they play on bringing in and establishing a good milk supply. Educated on risks related to offering formula supplements to milk supply and breast feeding relationship.  Discussed importance to wait to pump, offer bottles of EBM, and pacifiers for the first 4 weeks, focusing on exclusive breast feeding to establish a good milk supply unless medical indication for supplementation were to arise. Reviewed tips for preparing for return to work including pumping, collection, storage, and preperation of EBM, and how to introduce bottles of EBM while maintaining breast feeding relationship. Shown well fed baby check list and f/u outpatient lactation support services and how to contact for f/u as needed after discharge home. Name and number provided on whiteboard. Encouraged to call for [facility] to assess latch and for f/u support and assistance as needed. Response: Verbalized understanding of teaching provided. Confident with breast feeding and discharge home. Will call for f/u support prn.

## 2022-07-22 NOTE — PLAN OF CARE
Problem: Pain  Goal: Verbalizes/displays adequate comfort level or baseline comfort level  2022 09 by Kameron Smith RN  Outcome: Completed  Flowsheets (Taken 2022 8016)  Verbalizes/displays adequate comfort level or baseline comfort level:   Encourage patient to monitor pain and request assistance   Assess pain using appropriate pain scale   Administer analgesics based on type and severity of pain and evaluate response   Implement non-pharmacological measures as appropriate and evaluate response  2022 by Femi Liu RN  Outcome: Progressing     Problem: Vaginal Birth or  Section  Goal: Fetal and maternal status remain reassuring during the birth process  Description:  Birth OB-Pregnancy care plan goal which identifies if the fetal and maternal status remain reassuring during the birth process  2022 09 by Kameron Smith RN  Outcome: Completed  2022 by Femi Liu RN  Outcome: Progressing     Problem: Infection - Adult  Goal: Absence of infection at discharge  2022 09 by Kameron Smith RN  Outcome: Completed  2022 by Femi Liu RN  Outcome: Progressing  Goal: Absence of infection during hospitalization  2022 0947 by Kameron Smith RN  Outcome: Completed  2022 by Femi Liu RN  Outcome: Progressing  Goal: Absence of fever/infection during anticipated neutropenic period  2022 09 by Kameron Smith RN  Outcome: Completed  2022 by Femi Liu RN  Outcome: Progressing     Problem: Safety - Adult  Goal: Free from fall injury  2022 09 by Kameron Smith RN  Outcome: Completed  2022 by Femi Liu RN  Outcome: Progressing     Problem: Discharge Planning  Goal: Discharge to home or other facility with appropriate resources  2022 09 by Kameron Smith RN  Outcome: Completed  2022 by Femi Liu RN  Outcome: Progressing     Problem: Chronic Conditions and Co-morbidities  Goal: Patient's chronic conditions and co-morbidity symptoms are monitored and maintained or improved  7/22/2022 0947 by Padmini Camara RN  Outcome: Completed  7/21/2022 2038 by Rosio Moncada RN  Outcome: Progressing     Problem: Skin/Tissue Integrity  Goal: Absence of new skin breakdown  Description: 1. Monitor for areas of redness and/or skin breakdown  2. Assess vascular access sites hourly  3. Every 4-6 hours minimum:  Change oxygen saturation probe site  4. Every 4-6 hours:  If on nasal continuous positive airway pressure, respiratory therapy assess nares and determine need for appliance change or resting period.   7/22/2022 0947 by Padmini Camara RN  Outcome: Completed  7/21/2022 2038 by Rosio Moncada RN  Outcome: Progressing

## 2022-07-25 NOTE — PROGRESS NOTES
Return OB Office Visit    CC:   Chief Complaint   Patient presents with    Routine Prenatal Visit       HPI:  Patient seen and examined. Patient is doing well today without complaints. Denies VB, LOF, ctx. +FM. Denies headaches, vision changes, RUQ pain, increased LE edema. Denies chest pain, shortness of breath, fever, chills, nausea, vomiting. Review of Systems: The following ROS was otherwise negative, except as noted in the HPI: constitutional, HEENT, respiratory, cardiovascular, gastrointestinal, genitourinary, skin, musculoskeletal, neurological, psych    Objective:  /72   Pulse 84   Wt 196 lb 12.8 oz (89.3 kg)   LMP 10/11/2021 (Approximate)   BMI 30.37 kg/m²     Physical Exam  Vitals reviewed. Exam conducted with a chaperone present. Constitutional:       General: She is not in acute distress. Appearance: She is well-developed. HENT:      Head: Normocephalic and atraumatic. Eyes:      Conjunctiva/sclera: Conjunctivae normal.   Cardiovascular:      Rate and Rhythm: Normal rate. Pulmonary:      Effort: Pulmonary effort is normal. No respiratory distress. Abdominal:      General: There is no distension. Palpations: Abdomen is soft. Tenderness: There is no abdominal tenderness. There is no guarding or rebound. Musculoskeletal:         General: No swelling. Skin:     General: Skin is warm and dry. Neurological:      Mental Status: She is alert and oriented to person, place, and time. Psychiatric:         Mood and Affect: Mood normal.         Behavior: Behavior normal.         Thought Content: Thought content normal.       FHR: 138 bpm by doppler    Assessment/Plan:   Steph Hawkins is a 23 y.o. [place] at 40w2d who presents for routine OB visit    1.  Primigravida in third trimester     - Doing well today without complaints     - Fetal wellbeing reassuring by FH and FHR     - Maternal wellness questionnaire reviewed - no concerns today, score 0     - Labor, decreased FM, VB, LOF precautions reviewed      - Return precautions reviewed     - Has IOL scheduled 7/24/2022    2. Group B Streptococcus carrier, +RV culture, currently pregnant     - Will plan for antibiotics at time of labor     3.  Uterine size date discrepancy pregnancy, third trimester     - EFW 66.4 percentile     - Will continue to follow     - IOL scheduled 7/24/2022    Valentina Suazo DO

## 2022-07-28 ENCOUNTER — POSTPARTUM VISIT (OUTPATIENT)
Dept: OBGYN CLINIC | Age: 22
End: 2022-07-28

## 2022-07-28 VITALS
WEIGHT: 173.8 LBS | TEMPERATURE: 98.1 F | SYSTOLIC BLOOD PRESSURE: 118 MMHG | HEART RATE: 74 BPM | DIASTOLIC BLOOD PRESSURE: 76 MMHG | HEIGHT: 66 IN | BODY MASS INDEX: 27.93 KG/M2

## 2022-07-28 PROCEDURE — 0503F POSTPARTUM CARE VISIT: CPT | Performed by: OBSTETRICS & GYNECOLOGY

## 2022-07-28 NOTE — PROGRESS NOTES
Postpartum Visit    CC:   Chief Complaint   Patient presents with    Postpartum Care       23 y.o. Louie Mata s/p  Section on 2022 here for her postpartum visit:    HPI:    Patient is doing well today. Postpartum course has been uncomplicated. Reports lochia is decreasing. Pain is well controlled. Ambulating without difficulty, denies dizziness with standing. Denies concerns with bowel or bladder function. Denies headaches, vision changes, RUQ pain, increased LE edema. Denies chest pain, shortness of breath, fever, chills, nausea, vomiting. Reports moods have been doing well without concerns. Patient is breast feeding, which is going well. Reports infant has been doing well without problems. Patient is not sexually active. She is interested in natural family planning for contraception. Review of Systems - The following ROS was otherwise negative, except as noted in the HPI: constitutional, respiratory, cardiovascular, gastrointestinal, genitourinary    OB History  OB History    Para Term  AB Living   1 1 1 0 0 1   SAB IAB Ectopic Molar Multiple Live Births   0 0 0 0 0 1      # Outcome Date GA Lbr Reji/2nd Weight Sex Delivery Anes PTL Lv   1 Term 22 40w2d  7 lb 4.4 oz (3.3 kg) M CS-LTranv EPI N ISHAN      Complications: Fetal Intolerance       Medications:  Prior to Admission medications    Medication Sig Start Date End Date Taking? Authorizing Provider   clindamycin (CLINDAGEL) 1 % gel Apply topically 2 times daily Apply topically 2 times daily. 8/3/22   Tanya Lofton MD   ibuprofen (ADVIL;MOTRIN) 800 MG tablet Take 1 tablet by mouth in the morning and 1 tablet at noon and 1 tablet in the evening.   Patient not taking: Reported on 2022   Jan Bhardwaj DO   Omega-3 Fatty Acids (FISH OIL) 1000 MG CAPS Take 3,000 mg by mouth 3 times daily  22  Historical Provider, MD       Objective:  /76 (Site: Left Upper Arm, Position: Sitting, Cuff Size: Medium Adult)   Pulse 74   Temp 98.1 °F (36.7 °C) (Infrared)   Ht 5' 6\" (1.676 m)   Wt 173 lb 12.8 oz (78.8 kg)   LMP 10/11/2021 (Approximate)   BMI 28.05 kg/m²     Physical Exam  Vitals reviewed. Constitutional:       General: She is not in acute distress. Appearance: She is well-developed. HENT:      Head: Normocephalic and atraumatic. Eyes:      Conjunctiva/sclera: Conjunctivae normal.   Cardiovascular:      Rate and Rhythm: Normal rate. Pulmonary:      Effort: Pulmonary effort is normal. No respiratory distress. Abdominal:      General: There is no distension. Palpations: Abdomen is soft. Tenderness: There is no abdominal tenderness. There is no guarding or rebound. Comments: Steri-strips removed without difficulty today. Incision is healing well without evidence of infection. Musculoskeletal:         General: No swelling. Skin:     General: Skin is warm and dry. Neurological:      Mental Status: She is alert and oriented to person, place, and time. Psychiatric:         Mood and Affect: Mood normal.         Behavior: Behavior normal.         Thought Content: Thought content normal.        Assessment/Plan:   Cory Gomez is a 23 y.o. Ricelacey Martin s/p  Section on 2022 here for her postpartum visit:    1.  Postpartum care following  delivery     - Patient is doing well today without complaints     - Meeting milestones     - Maternal wellness questionnaire reviewed - no concerns today, score 2     - FP: natural family planning     - Postpartum instructions and precautions reviewed     - Return precautions reviewed     - Will follow-up for 6 week postpartum visit    [facility], DO

## 2022-08-04 PROBLEM — Z34.03 PRIMIGRAVIDA IN THIRD TRIMESTER: Status: RESOLVED | Noted: 2022-02-20 | Resolved: 2022-08-04

## 2022-08-04 PROBLEM — Z37.9 NORMAL LABOR: Status: RESOLVED | Noted: 2022-07-19 | Resolved: 2022-08-04

## 2022-08-04 PROBLEM — O26.843 UTERINE SIZE DATE DISCREPANCY PREGNANCY, THIRD TRIMESTER: Status: RESOLVED | Noted: 2022-05-02 | Resolved: 2022-08-04

## 2022-08-30 ENCOUNTER — POSTPARTUM VISIT (OUTPATIENT)
Dept: OBGYN CLINIC | Age: 22
End: 2022-08-30

## 2022-08-30 VITALS
BODY MASS INDEX: 27.92 KG/M2 | TEMPERATURE: 98.4 F | WEIGHT: 173 LBS | HEART RATE: 101 BPM | DIASTOLIC BLOOD PRESSURE: 64 MMHG | SYSTOLIC BLOOD PRESSURE: 110 MMHG

## 2022-08-30 PROCEDURE — 0503F POSTPARTUM CARE VISIT: CPT | Performed by: OBSTETRICS & GYNECOLOGY

## 2022-09-04 PROBLEM — O99.820 GROUP B STREPTOCOCCUS CARRIER, +RV CULTURE, CURRENTLY PREGNANT: Status: RESOLVED | Noted: 2022-06-30 | Resolved: 2022-09-04

## 2022-09-04 ASSESSMENT — ENCOUNTER SYMPTOMS
GASTROINTESTINAL NEGATIVE: 1
NAUSEA: 0
SHORTNESS OF BREATH: 0
RESPIRATORY NEGATIVE: 1
ABDOMINAL PAIN: 0
DIARRHEA: 0
VOMITING: 0
CONSTIPATION: 0

## 2022-09-04 NOTE — PROGRESS NOTES
Subjective:      Patient ID: Lamar Garcia is a 23 y.o. female. HPI    23 y.o.  female presents for postop/postpartum visit. Patient is 6 weeks s/p  Section on 2022. Patient is doing well today. Postpartum course has been uncomplicated. Stopped bleeding a couple of weeks ago. Pain is well controlled. Denies concerns with bowel or bladder function. Constipation is improving. Denies headaches, vision changes, RUQ pain, increased LE edema. Denies chest pain, shortness of breath, fever, chills, nausea, vomiting. Reports moods have been doing well without concerns. Noted some anxiety the first week postpartum. Patient is breast feeding. Reports infant has been doing well without problems. Patient is not sexually active. She is interested in natural family planning for contraception. Last pap smear 22--normal     Review of Systems   Constitutional: Negative. Negative for activity change, appetite change, chills, fatigue, fever and unexpected weight change. Eyes:  Negative for visual disturbance. Respiratory: Negative. Negative for shortness of breath. Cardiovascular: Negative. Negative for chest pain. Gastrointestinal: Negative. Negative for abdominal pain, constipation, diarrhea, nausea and vomiting. Endocrine: Negative for cold intolerance and heat intolerance. Genitourinary:  Negative for difficulty urinating, dysuria, hematuria, pelvic pain, vaginal bleeding, vaginal discharge and vaginal pain. Skin: Negative. Neurological:  Negative for headaches. Hematological:  Does not bruise/bleed easily. Psychiatric/Behavioral: Negative. Negative for dysphoric mood and suicidal ideas. The patient is not nervous/anxious. Objective:   Physical Exam  Vitals and nursing note reviewed. Constitutional:       General: She is not in acute distress. Appearance: Normal appearance. She is well-developed.  She is not ill-appearing, toxic-appearing or diaphoretic. Pulmonary:      Effort: Pulmonary effort is normal.   Abdominal:      General: There is no distension. Palpations: Abdomen is soft. Tenderness: There is no abdominal tenderness. There is no guarding. Comments: Incision clean dry intact. Well healed. Skin:     General: Skin is warm and dry. Neurological:      Mental Status: She is alert and oriented to person, place, and time. Psychiatric:         Mood and Affect: Mood normal.         Behavior: Behavior normal.         Thought Content: Thought content normal.         Judgment: Judgment normal.       Assessment:       Diagnosis Orders   1. Postpartum care and examination        2.  delivery delivered        3. Lactating mother                Plan:      Declines contraception. Appropriate pregnancy spacing reviewed. Follow up prn and 1 year for well woman examination.            Tom Bunn DO

## 2022-09-12 ENCOUNTER — TELEPHONE (OUTPATIENT)
Dept: OBGYN CLINIC | Age: 22
End: 2022-09-12

## 2022-09-12 NOTE — TELEPHONE ENCOUNTER
Started working out last Monday and has continued 4 times lasting 30 minutes. The second time she had intercourse she spotted for a few days not enough blood to need a panty liner. Patient would like to know if she needs to back down on activity? She is having some light spotting today, no pain. She will monitor symptoms. Pain and bleeding precautions given, she will report to the ED if needed. Routing to Dr. Mason Kerr. Tylenol 650 mg orally as needed every 4-6 hours

## 2022-09-13 NOTE — TELEPHONE ENCOUNTER
Pt notified of provider recommendations. Bleeding precautions reviewed at this time. Pt had no further questions.

## 2022-11-18 ENCOUNTER — OFFICE VISIT (OUTPATIENT)
Dept: FAMILY MEDICINE CLINIC | Age: 22
End: 2022-11-18
Payer: COMMERCIAL

## 2022-11-18 VITALS
BODY MASS INDEX: 26.68 KG/M2 | HEIGHT: 66 IN | OXYGEN SATURATION: 99 % | SYSTOLIC BLOOD PRESSURE: 108 MMHG | WEIGHT: 166 LBS | HEART RATE: 70 BPM | DIASTOLIC BLOOD PRESSURE: 72 MMHG | RESPIRATION RATE: 16 BRPM

## 2022-11-18 DIAGNOSIS — D22.5 ATYPICAL NEVUS OF ABDOMINAL WALL: ICD-10-CM

## 2022-11-18 DIAGNOSIS — F41.9 ANXIETY: ICD-10-CM

## 2022-11-18 DIAGNOSIS — F41.9 ANXIETY: Primary | ICD-10-CM

## 2022-11-18 DIAGNOSIS — R79.89 LOW SERUM PROGESTERONE: ICD-10-CM

## 2022-11-18 DIAGNOSIS — R79.89 ELEVATED PROLACTIN LEVEL: ICD-10-CM

## 2022-11-18 LAB — TSH REFLEX FT4: 2.21 UIU/ML (ref 0.27–4.2)

## 2022-11-18 PROCEDURE — 99214 OFFICE O/P EST MOD 30 MIN: CPT | Performed by: FAMILY MEDICINE

## 2022-11-18 RX ORDER — SERTRALINE HYDROCHLORIDE 25 MG/1
TABLET, FILM COATED ORAL
Qty: 30 TABLET | Refills: 1 | Status: SHIPPED | OUTPATIENT
Start: 2022-11-18

## 2022-11-18 SDOH — ECONOMIC STABILITY: FOOD INSECURITY: WITHIN THE PAST 12 MONTHS, THE FOOD YOU BOUGHT JUST DIDN'T LAST AND YOU DIDN'T HAVE MONEY TO GET MORE.: NEVER TRUE

## 2022-11-18 SDOH — ECONOMIC STABILITY: FOOD INSECURITY: WITHIN THE PAST 12 MONTHS, YOU WORRIED THAT YOUR FOOD WOULD RUN OUT BEFORE YOU GOT MONEY TO BUY MORE.: NEVER TRUE

## 2022-11-18 ASSESSMENT — SOCIAL DETERMINANTS OF HEALTH (SDOH): HOW HARD IS IT FOR YOU TO PAY FOR THE VERY BASICS LIKE FOOD, HOUSING, MEDICAL CARE, AND HEATING?: NOT HARD AT ALL

## 2022-11-18 NOTE — PROGRESS NOTES
11/18/2022    This is a 23 y.o. female   Chief Complaint   Patient presents with    Anxiety     Pt wants to talk about anxiety meds, she is having anxiety pretty much every day, she is having issues with anxiety about going back to work and her baby going to       HPI    Anxiety  - chronic and has been in counseling for a good while. Is working on CBT and is found many of the tools helpful. Since having her baby this past year, notes anxiety has been a little more heightened. Has had good conversations with her  and feels that the anxiety often is impacting her quality of life and is interested in starting medication.  -Reports good sleep habits, healthy relationships, active spiritual life    Has an atypical nevus in the abdominal wall she would like examined    Review of Systems     Current Outpatient Medications   Medication Sig Dispense Refill    sertraline (ZOLOFT) 25 MG tablet Take 1/2 tab by mouth for first two weeks, followed by one tab by mouth daily. 30 tablet 1    clindamycin (CLINDAGEL) 1 % gel Apply topically 2 times daily Apply topically 2 times daily. 2 each 5     No current facility-administered medications for this visit. /72 (Site: Left Upper Arm, Position: Sitting, Cuff Size: Large Adult)   Pulse 70   Resp 16   Ht 5' 6\" (1.676 m)   Wt 166 lb (75.3 kg)   SpO2 99%   BMI 26.79 kg/m²     Physical Exam  4 x 4 millimeter nevus of abdominal wall  Wt Readings from Last 3 Encounters:   11/18/22 166 lb (75.3 kg)   08/30/22 173 lb (78.5 kg)   07/28/22 173 lb 12.8 oz (78.8 kg)     BP Readings from Last 3 Encounters:   11/18/22 108/72   08/30/22 110/64   07/28/22 118/76     Assessment/Plan:  1. Anxiety  She is following closely with a counselor and has been working on other good healthy coping skills. Warrants medication treatment based on impact of day-to-day living.   Start Zoloft at low-dose and titrate up slowly, reviewed potential side effects  - sertraline (ZOLOFT) 25 MG tablet; Take 1/2 tab by mouth for first two weeks, followed by one tab by mouth daily. Dispense: 30 tablet; Refill: 1  - TSH with Reflex to FT4; Future    2. Elevated prolactin level  Noted on prior labs,    3. Low serum progesterone  Noted on prior labs, will likely supplement with progesterone when cycles return later postpartum    4. Atypical nevus of abdominal wall - 4x4mm, monitor  Measured today. We will monitor. If it increases in size, recommend biopsy    Return in about 6 weeks (around 12/30/2022) for anxiety f/u.

## 2022-12-05 ENCOUNTER — PATIENT MESSAGE (OUTPATIENT)
Dept: FAMILY MEDICINE CLINIC | Age: 22
End: 2022-12-05

## 2022-12-05 RX ORDER — ONDANSETRON 4 MG/1
4 TABLET, FILM COATED ORAL 3 TIMES DAILY PRN
Qty: 15 TABLET | Refills: 0 | Status: SHIPPED | OUTPATIENT
Start: 2022-12-05

## 2022-12-30 ENCOUNTER — OFFICE VISIT (OUTPATIENT)
Dept: FAMILY MEDICINE CLINIC | Age: 22
End: 2022-12-30
Payer: COMMERCIAL

## 2022-12-30 VITALS
HEIGHT: 66 IN | RESPIRATION RATE: 16 BRPM | BODY MASS INDEX: 26.03 KG/M2 | HEART RATE: 70 BPM | SYSTOLIC BLOOD PRESSURE: 110 MMHG | OXYGEN SATURATION: 98 % | WEIGHT: 162 LBS | DIASTOLIC BLOOD PRESSURE: 68 MMHG

## 2022-12-30 DIAGNOSIS — F41.9 ANXIETY: ICD-10-CM

## 2022-12-30 PROCEDURE — 99213 OFFICE O/P EST LOW 20 MIN: CPT | Performed by: FAMILY MEDICINE

## 2022-12-30 RX ORDER — SERTRALINE HYDROCHLORIDE 25 MG/1
TABLET, FILM COATED ORAL
Qty: 90 TABLET | Refills: 1 | Status: SHIPPED | OUTPATIENT
Start: 2022-12-30

## 2022-12-30 NOTE — PROGRESS NOTES
12/30/2022    This is a 23 y.o. female   Chief Complaint   Patient presents with    Anxiety     Patient is here for a medication follow up     HPI    Here for follow up for anxiety  -Started Zoloft at her last visit. She reports tolerating the medication well. She had some nausea that is improving.  -Notably, her son was also starting . This was a source of anxiety. She reports he is doing well there and she is happy with his care there. This is also helped the anxiety.  -Has good support system from family, .  -Sleep is as good as can be expected with young baby. -Overall notes improvement on the medication is happy with current dose    Review of Systems     Current Outpatient Medications   Medication Sig Dispense Refill    sertraline (ZOLOFT) 25 MG tablet Take one tab by mouth daily 90 tablet 1    ondansetron (ZOFRAN) 4 MG tablet Take 1 tablet by mouth 3 times daily as needed for Nausea or Vomiting 15 tablet 0    clindamycin (CLINDAGEL) 1 % gel Apply topically 2 times daily Apply topically 2 times daily. 2 each 5     No current facility-administered medications for this visit. /68 (Site: Right Upper Arm, Position: Sitting, Cuff Size: Large Adult)   Pulse 70   Resp 16   Ht 5' 6\" (1.676 m)   Wt 162 lb (73.5 kg)   SpO2 98%   BMI 26.15 kg/m²     Physical Exam    Wt Readings from Last 3 Encounters:   12/30/22 162 lb (73.5 kg)   11/18/22 166 lb (75.3 kg)   08/30/22 173 lb (78.5 kg)     BP Readings from Last 3 Encounters:   12/30/22 110/68   11/18/22 108/72   08/30/22 110/64     Assessment/Plan:  1. Anxiety  - sertraline (ZOLOFT) 25 MG tablet; Take one tab by mouth daily  Dispense: 90 tablet; Refill: 1    Continue current low-dose since she is doing well on it. We discussed reassessing every 6 months to see if the medication is still needed, call sooner if needed for any concerns or for dose increase    Return in about 6 months (around 6/30/2023) for anxiety f/u.

## 2023-02-16 ENCOUNTER — OFFICE VISIT (OUTPATIENT)
Dept: FAMILY MEDICINE CLINIC | Age: 23
End: 2023-02-16
Payer: COMMERCIAL

## 2023-02-16 VITALS
TEMPERATURE: 98 F | HEIGHT: 66 IN | HEART RATE: 90 BPM | RESPIRATION RATE: 16 BRPM | OXYGEN SATURATION: 100 % | BODY MASS INDEX: 26.15 KG/M2

## 2023-02-16 DIAGNOSIS — B96.89 ACUTE BACTERIAL SINUSITIS: Primary | ICD-10-CM

## 2023-02-16 DIAGNOSIS — H92.03 EAR PAIN, BILATERAL: ICD-10-CM

## 2023-02-16 DIAGNOSIS — J01.90 ACUTE BACTERIAL SINUSITIS: Primary | ICD-10-CM

## 2023-02-16 PROCEDURE — 99213 OFFICE O/P EST LOW 20 MIN: CPT | Performed by: FAMILY MEDICINE

## 2023-02-16 RX ORDER — AMOXICILLIN AND CLAVULANATE POTASSIUM 875; 125 MG/1; MG/1
1 TABLET, FILM COATED ORAL 2 TIMES DAILY
Qty: 14 TABLET | Refills: 0 | Status: SHIPPED | OUTPATIENT
Start: 2023-02-16 | End: 2023-02-23

## 2023-02-16 RX ORDER — FLUCONAZOLE 150 MG/1
150 TABLET ORAL ONCE
Qty: 2 TABLET | Refills: 0 | Status: SHIPPED | OUTPATIENT
Start: 2023-02-16 | End: 2023-02-16

## 2023-02-16 SDOH — ECONOMIC STABILITY: FOOD INSECURITY: WITHIN THE PAST 12 MONTHS, THE FOOD YOU BOUGHT JUST DIDN'T LAST AND YOU DIDN'T HAVE MONEY TO GET MORE.: NEVER TRUE

## 2023-02-16 SDOH — ECONOMIC STABILITY: TRANSPORTATION INSECURITY
IN THE PAST 12 MONTHS, HAS LACK OF TRANSPORTATION KEPT YOU FROM MEETINGS, WORK, OR FROM GETTING THINGS NEEDED FOR DAILY LIVING?: NO

## 2023-02-16 SDOH — ECONOMIC STABILITY: HOUSING INSECURITY
IN THE LAST 12 MONTHS, WAS THERE A TIME WHEN YOU DID NOT HAVE A STEADY PLACE TO SLEEP OR SLEPT IN A SHELTER (INCLUDING NOW)?: NO

## 2023-02-16 SDOH — ECONOMIC STABILITY: FOOD INSECURITY: WITHIN THE PAST 12 MONTHS, YOU WORRIED THAT YOUR FOOD WOULD RUN OUT BEFORE YOU GOT MONEY TO BUY MORE.: NEVER TRUE

## 2023-02-16 SDOH — ECONOMIC STABILITY: INCOME INSECURITY: HOW HARD IS IT FOR YOU TO PAY FOR THE VERY BASICS LIKE FOOD, HOUSING, MEDICAL CARE, AND HEATING?: NOT HARD AT ALL

## 2023-02-16 NOTE — PROGRESS NOTES
2/16/2023    This is a 23 y.o. female   Chief Complaint   Patient presents with    Otalgia     Bi lateral ear pain, pt has sinus pain and headaches congestion, cough, just tired and drain coughing and blowing out a yellow color      HPI    Here for concerns for not feeling well. Symptoms began a couple weeks ago with viral URI. She was improving but then had double sickening symptoms a few days ago. She notes sinus pain and pressure, ear pain bilaterally, drainage. No significant cough. No fever or systemic symptoms    Review of Systems     Current Outpatient Medications   Medication Sig Dispense Refill    amoxicillin-clavulanate (AUGMENTIN) 875-125 MG per tablet Take 1 tablet by mouth 2 times daily for 7 days 14 tablet 0    fluconazole (DIFLUCAN) 150 MG tablet Take 1 tablet by mouth once for 1 dose Repeat in 3 days. 2 tablet 0    sertraline (ZOLOFT) 25 MG tablet Take one tab by mouth daily 90 tablet 1    ondansetron (ZOFRAN) 4 MG tablet Take 1 tablet by mouth 3 times daily as needed for Nausea or Vomiting 15 tablet 0    clindamycin (CLINDAGEL) 1 % gel Apply topically 2 times daily Apply topically 2 times daily. 2 each 5     No current facility-administered medications for this visit. Pulse 90   Temp 98 °F (36.7 °C) (Tympanic)   Resp 16   Ht 5' 6\" (1.676 m)   SpO2 100%   BMI 26.15 kg/m²     Physical Exam  Constitutional:       Appearance: Normal appearance. HENT:      Head:      Comments: Sinus tenderness to palpation, frontal     Ears:      Comments: Bilateral TMs with moderate erythema but minimal bulging     Nose: Congestion present. Mouth/Throat:      Pharynx: No oropharyngeal exudate. Cardiovascular:      Rate and Rhythm: Normal rate and regular rhythm. Pulmonary:      Effort: Pulmonary effort is normal.      Breath sounds: Normal breath sounds. Musculoskeletal:      Cervical back: Normal range of motion. Neurological:      Mental Status: She is alert.        Wt Readings from Last 3 Encounters:   12/30/22 162 lb (73.5 kg)   11/18/22 166 lb (75.3 kg)   08/30/22 173 lb (78.5 kg)     BP Readings from Last 3 Encounters:   12/30/22 110/68   11/18/22 108/72   08/30/22 110/64     Assessment/Plan:  1. Acute bacterial sinusitis  - amoxicillin-clavulanate (AUGMENTIN) 875-125 MG per tablet; Take 1 tablet by mouth 2 times daily for 7 days  Dispense: 14 tablet; Refill: 0    2. Ear pain, bilateral    Pt with significant sinus symptoms warranting antibiotic treatment based on duration and severity. Normal lung exam. Discussed med may cause diarrhea and encouraged use of probiotic. Take mucinex for congestion and increase water intake. Call if symptoms worsen or fail to improve.

## 2023-03-06 ENCOUNTER — PATIENT MESSAGE (OUTPATIENT)
Dept: FAMILY MEDICINE CLINIC | Age: 23
End: 2023-03-06

## 2023-03-06 RX ORDER — FLUCONAZOLE 150 MG/1
150 TABLET ORAL ONCE
Qty: 2 TABLET | Refills: 0 | Status: SHIPPED | OUTPATIENT
Start: 2023-03-06 | End: 2023-03-06

## 2023-03-06 RX ORDER — AZITHROMYCIN 250 MG/1
TABLET, FILM COATED ORAL
Qty: 6 TABLET | Refills: 0 | Status: SHIPPED | OUTPATIENT
Start: 2023-03-06

## 2023-03-06 RX ORDER — PREDNISONE 10 MG/1
TABLET ORAL
Qty: 12 TABLET | Refills: 0 | Status: SHIPPED | OUTPATIENT
Start: 2023-03-06

## 2023-03-24 RX ORDER — CEFDINIR 300 MG/1
300 CAPSULE ORAL 2 TIMES DAILY
Qty: 14 CAPSULE | Refills: 0 | Status: SHIPPED | OUTPATIENT
Start: 2023-03-24 | End: 2023-03-31

## 2023-03-24 RX ORDER — FLUCONAZOLE 150 MG/1
150 TABLET ORAL ONCE
Qty: 2 TABLET | Refills: 0 | Status: SHIPPED | OUTPATIENT
Start: 2023-03-24 | End: 2023-03-24

## 2023-04-17 ENCOUNTER — PATIENT MESSAGE (OUTPATIENT)
Dept: FAMILY MEDICINE CLINIC | Age: 23
End: 2023-04-17

## 2023-04-17 DIAGNOSIS — M65.4 DE QUERVAIN'S DISEASE (RADIAL STYLOID TENOSYNOVITIS): Primary | ICD-10-CM

## 2023-05-01 ENCOUNTER — HOSPITAL ENCOUNTER (OUTPATIENT)
Dept: PHYSICAL THERAPY | Age: 23
Setting detail: THERAPIES SERIES
Discharge: HOME | End: 2023-05-01
Payer: COMMERCIAL

## 2023-05-01 PROCEDURE — 97110 THERAPEUTIC EXERCISES: CPT | Performed by: PHYSICAL THERAPIST

## 2023-05-01 PROCEDURE — 97161 PT EVAL LOW COMPLEX 20 MIN: CPT | Performed by: PHYSICAL THERAPIST

## 2023-05-01 PROCEDURE — 97140 MANUAL THERAPY 1/> REGIONS: CPT | Performed by: PHYSICAL THERAPIST

## 2023-05-01 NOTE — PLAN OF CARE
and/or comorbidities that impact the plan of care  []3 personal factors and/or comorbidities that impact the plan of care  [x] An examination of body systems using standardized tests and measures addressing any of the following: body structures and functions (impairments), activity limitations, and/or participation restrictions;:  [] a total of 1-2 or more elements   [] a total of 3 or more elements   [x] a total of 4 or more elements   [x] A clinical presentation with:  [x] stable and/or uncomplicated characteristics   [] evolving clinical presentation with changing characteristics  [] unstable and unpredictable characteristics;   [x] Clinical decision making of [x] low, [] moderate, [] high complexity using standardized patient assessment instrument and/or measurable assessment of functional outcome.     [x] EVAL (LOW) 82470 (typically 20 minutes face-to-face)  [] EVAL (MOD) 31783 (typically 30 minutes face-to-face)  [] EVAL (HIGH) 50968 (typically 45 minutes face-to-face)  [] RE-EVAL 72402        Electronically signed by:  Elio Arias, PT, DPT, Board-Certified Clinical Specialist in Orthopaedic Physical Therapy [id number]

## 2023-05-01 NOTE — FLOWSHEET NOTE
100 [facility] Performance and Rehabilitation a Department of [address]  [address] [place] [address], [address]  Office: [phone]  Fax:  [phone]      Physical Therapy Treatment Note/ Progress Report:           Date:  2023    Patient Name:  Keiry Brink    :  2000  MRN: [id number]  Restrictions/Precautions:    Medical/Treatment Diagnosis Information:  Diagnosis: Bilateral Quervain's M65.4  Treatment Diagnosis: Bilateral wrist pain-  I92.644  Insurance/Certification information:  PT Insurance Information: [facility]  Physician Information:  Referring Provider (secondary): Day  Has the plan of care been signed (Y/N):        []  Yes  [x]  No     Date of Patient follow up with Physician: 2023      Is this a Progress Report:     []  Yes  [x]  No        If Yes:  Date Range for reporting period:  Beginning 2023   Ending    Progress report will be due (10 Rx or 30 days whichever is less): 29 May 2023          Visit # Insurance Allowable Auth Required    30 []  Yes [x]  No      Latex Allergy:  [x]NO      []YES  Preferred Language for Healthcare:   [x]English       []other:     Functional Scale:  UEFI-65%, QD- 43.2%, work QD-37.5%, sports- 56.25%   Date assessed: 2023    Pain level:  See eval     SUBJECTIVE:  See eval    OBJECTIVE: See eval  Observation:   Test measurements:    ROM PROM AROM Comments    Left Right Left Right    Elbow flexion        Elbow extension        Pronation        Supination        Wrist flexion        Wrist extension        Wrist radial deviation        Wrist ulnar deviation          Strength Left Right Comments   Elbow flexion      Elbow extension      Pronation      Supination      Wrist flexion      Wrist extension      Wrist radial deviation      Wrist ulnar deviation         Left Right Comments   Level 1      Level 2      Level 3      Level 4      Level 5      other            RESTRICTIONS/PRECAUTIONS: anxiety,

## 2023-05-08 ENCOUNTER — HOSPITAL ENCOUNTER (OUTPATIENT)
Dept: PHYSICAL THERAPY | Age: 23
Setting detail: THERAPIES SERIES
Discharge: HOME | End: 2023-05-08
Payer: COMMERCIAL

## 2023-05-08 PROCEDURE — 97140 MANUAL THERAPY 1/> REGIONS: CPT | Performed by: PHYSICAL THERAPIST

## 2023-05-08 PROCEDURE — 97112 NEUROMUSCULAR REEDUCATION: CPT | Performed by: PHYSICAL THERAPIST

## 2023-05-08 PROCEDURE — 97110 THERAPEUTIC EXERCISES: CPT | Performed by: PHYSICAL THERAPIST

## 2023-05-08 NOTE — FLOWSHEET NOTE
I was able to elicit paresthesias in several positions/testing positions as listed above. She and I did discuss working on thoracic ext t/o the day and trying to sit more upright with work. Pt did report improved opposition alejandra. She will continue to benefit from skilled PT to improve strength, pain, symptoms, function. PLAN: If pt doesn't return, this note can be considered a D/C note.     [x] Continue per plan of care [] Alter current plan (see comments above)  [] Plan of care initiated [] Hold pending MD visit [] Discharge    Electronically signed by: Leeann Interiano, PT, DPT, Board-Certified Clinical Specialist in Orthopaedic Physical Therapy [id number]

## 2023-05-15 ENCOUNTER — HOSPITAL ENCOUNTER (OUTPATIENT)
Dept: PHYSICAL THERAPY | Age: 23
Setting detail: THERAPIES SERIES
Discharge: HOME | End: 2023-05-15
Payer: COMMERCIAL

## 2023-05-15 PROCEDURE — 97112 NEUROMUSCULAR REEDUCATION: CPT | Performed by: PHYSICAL THERAPIST

## 2023-05-15 PROCEDURE — 97140 MANUAL THERAPY 1/> REGIONS: CPT | Performed by: PHYSICAL THERAPIST

## 2023-05-15 PROCEDURE — 97110 THERAPEUTIC EXERCISES: CPT | Performed by: PHYSICAL THERAPIST

## 2023-05-15 PROCEDURE — 97530 THERAPEUTIC ACTIVITIES: CPT | Performed by: PHYSICAL THERAPIST

## 2023-05-15 NOTE — FLOWSHEET NOTE
100 [facility] Performance and Rehabilitation a Department of [address]  [address] [place] [address], [address]  Office: [phone]  Fax:  [phone]      Physical Therapy Treatment Note/ Progress Report:           Date:  5/15/2023    Patient Name:  Jim Weir    :  2000  MRN: [id number]  Restrictions/Precautions:    Medical/Treatment Diagnosis Information:  Diagnosis: Bilateral Quervain's M65.4  Treatment Diagnosis: Bilateral wrist pain-  R55.370  Insurance/Certification information:  PT Insurance Information: [facility]  Physician Information:  Referring Provider (secondary): Day  Has the plan of care been signed (Y/N):        []  Yes  [x]  No     Date of Patient follow up with Physician: 2023      Is this a Progress Report:     []  Yes  [x]  No        If Yes:  Date Range for reporting period:  Beginning 2023   Ending    Progress report will be due (10 Rx or 30 days whichever is less): 29 May 2023          Visit # Insurance Allowable Auth Required   3 30 []  Yes [x]  No      Latex Allergy:  [x]NO      []YES  Preferred Language for Healthcare:   [x]English       []other:     Functional Scale: UEFI-65%, QD- 43.2%, work QD-37.5%, sports- 56.25%   Date assessed: 2023    Pain level:  4/10 on left. 3/10 on right. SUBJECTIVE:  She had symptoms- tingling when she took a couple naps without the splints. She was sore for a couple days after last tx. This soreness lasted a couple days. After that she felt fin. Her mid back is sore. She's not done the weight ECL at home as she has been more sore. OBJECTIVE: 23  Observation: + ulnar nerve Tinnel's. - Ulnar nerve tension test. +Spurling's bilaterally. No changes with traction for relief in tingling. Tingling does follow ulnar nerve.  +tingling with 1st rib mob on left side.    Test measurements:    ROM PROM AROM Comments    Left Right Left Right    Elbow flexion        Elbow extension

## 2023-05-22 ENCOUNTER — HOSPITAL ENCOUNTER (OUTPATIENT)
Dept: PHYSICAL THERAPY | Age: 23
Setting detail: THERAPIES SERIES
Discharge: HOME | End: 2023-05-22
Payer: COMMERCIAL

## 2023-05-22 PROCEDURE — 97530 THERAPEUTIC ACTIVITIES: CPT | Performed by: PHYSICAL THERAPIST

## 2023-05-22 PROCEDURE — 97112 NEUROMUSCULAR REEDUCATION: CPT | Performed by: PHYSICAL THERAPIST

## 2023-05-22 PROCEDURE — 97110 THERAPEUTIC EXERCISES: CPT | Performed by: PHYSICAL THERAPIST

## 2023-05-22 PROCEDURE — 97140 MANUAL THERAPY 1/> REGIONS: CPT | Performed by: PHYSICAL THERAPIST

## 2023-05-22 NOTE — FLOWSHEET NOTE
100 [facility] Performance and Rehabilitation a Department of [address]  [address] [place] [address], [address]  Office: [phone]  Fax:  [phone]      Physical Therapy Treatment Note/ Progress Report:           Date:  2023    Patient Name:  Amber Hamilton    :  2000  MRN: [id number]  Restrictions/Precautions:    Medical/Treatment Diagnosis Information:  Diagnosis: Bilateral Quervain's M65.4  Treatment Diagnosis: Bilateral wrist pain-  N17.340  Insurance/Certification information:  PT Insurance Information: [facility]  Physician Information:  Referring Provider (secondary): Day  Has the plan of care been signed (Y/N):        []  Yes  [x]  No     Date of Patient follow up with Physician: 2023      Is this a Progress Report:     []  Yes  [x]  No        If Yes:  Date Range for reporting period:  Beginning 2023   Ending    Progress report will be due (10 Rx or 30 days whichever is less): 29 May 2023          Visit # Insurance Allowable Auth Required   4 30 []  Yes [x]  No      Latex Allergy:  [x]NO      []YES  Preferred Language for Healthcare:   [x]English       []other:     Functional Scale: UEFI-65%, QD- 43.2%, work QD-37.5%, sports- 56.25%   Date assessed: 2023    Pain level:  3/10 on left. 3/10 on right. SUBJECTIVE:  the past couple days, when she wakes up, she has more symptoms. She is more sore in her whole hand and wrist.  Pt reports 30-40% improvement. Pt reports her pain at worst 6-7/10. Her pain at best on the left has been 1/10. OBJECTIVE: 23  Observation: + ulnar nerve Tinnel's. - Ulnar nerve tension test. +Spurling's bilaterally. No changes with traction for relief in tingling. Tingling does follow ulnar nerve.  +tingling with 1st rib mob on left side.    Test measurements:    ROM PROM AROM Comments    Left Right Left Right    Elbow flexion        Elbow extension        Pronation   77 69    Supination   82 82

## 2023-06-01 ENCOUNTER — HOSPITAL ENCOUNTER (OUTPATIENT)
Dept: PHYSICAL THERAPY | Age: 23
Setting detail: THERAPIES SERIES
Discharge: HOME | End: 2023-06-01
Payer: COMMERCIAL

## 2023-06-01 PROCEDURE — 97110 THERAPEUTIC EXERCISES: CPT | Performed by: PHYSICAL THERAPIST

## 2023-06-01 PROCEDURE — 97140 MANUAL THERAPY 1/> REGIONS: CPT | Performed by: PHYSICAL THERAPIST

## 2023-06-01 PROCEDURE — 97112 NEUROMUSCULAR REEDUCATION: CPT | Performed by: PHYSICAL THERAPIST

## 2023-06-01 PROCEDURE — 97530 THERAPEUTIC ACTIVITIES: CPT | Performed by: PHYSICAL THERAPIST

## 2023-06-01 NOTE — PLAN OF CARE
manner. Upon completion the clinician cleaned the IASTM tools as per [facility] recommendations. Skin check pre: in tact   Skin check post: histamine reaction on right more towards origin and on left t/o  Intermittent tx time: 8'      Therapeutic Exercise and NMR EXR  [x]  (88225) Provided verbal/tactile cueing for activities related to strengthening, flexibility, endurance, ROM  for improvements in scapular, scapulothoracic and UE control with self care, reaching, carrying, lifting, house/yardwork, driving/computer work. [x]  (63287) Provided verbal/tactile cueing for activities related to improving balance, coordination, kinesthetic sense, posture, motor skill, proprioception  to assist with  scapular, scapulothoracic and UE control with self care, reaching, carrying, lifting, house/yardwork, driving/computer work. Therapeutic Activities:    [x]  (33604 or 99922) Provided verbal/tactile cueing for activities related to improving balance, coordination, kinesthetic sense, posture, motor skill, proprioception and motor activation to allow for proper function of scapular, scapulothoracic and UE control with self care, carrying, lifting, driving/computer work. Home Exercise Program:    [x]  (45005) Reviewed/Progressed HEP activities related to strengthening, flexibility, endurance, ROM of scapular, scapulothoracic and UE control with self care, reaching, carrying, lifting, house/yardwork, driving/computer work  [x]  (05663) Reviewed/Progressed HEP activities related to improving balance, coordination, kinesthetic sense, posture, motor skill, proprioception of scapular, scapulothoracic and UE control with self care, reaching, carrying, lifting, house/yardwork, driving/computer work      Access Code: [email or web address]  URL: [email or web address]  Date: 06/01/2023  Prepared by: Delilah Soria    Exercises  - Standing Wrist Extension Stretch  - 1-2 x daily - 7 x weekly - 3 reps - 30 hold  - Standing Wrist

## 2023-06-05 ENCOUNTER — APPOINTMENT (OUTPATIENT)
Dept: PHYSICAL THERAPY | Age: 23
End: 2023-06-05
Payer: COMMERCIAL

## 2023-06-06 ENCOUNTER — APPOINTMENT (OUTPATIENT)
Dept: PHYSICAL THERAPY | Age: 23
End: 2023-06-06
Payer: COMMERCIAL

## 2023-06-08 ENCOUNTER — HOSPITAL ENCOUNTER (OUTPATIENT)
Dept: PHYSICAL THERAPY | Age: 23
Setting detail: THERAPIES SERIES
Discharge: HOME | End: 2023-06-08
Payer: COMMERCIAL

## 2023-06-08 PROCEDURE — 97140 MANUAL THERAPY 1/> REGIONS: CPT | Performed by: PHYSICAL THERAPIST

## 2023-06-08 PROCEDURE — 97530 THERAPEUTIC ACTIVITIES: CPT | Performed by: PHYSICAL THERAPIST

## 2023-06-08 PROCEDURE — 97110 THERAPEUTIC EXERCISES: CPT | Performed by: PHYSICAL THERAPIST

## 2023-06-08 NOTE — FLOWSHEET NOTE
100 [facility] Performance and Rehabilitation a Department of [address]  [address] [place] [address], [address]  Office: [phone]  Fax:  [phone]      Physical Therapy Treatment Note/ Progress Report:            Date:  2023    Patient Name:  Jasmyn Duran    :  2000  MRN: [id number]  Restrictions/Precautions:    Medical/Treatment Diagnosis Information:  Diagnosis: Bilateral Quervain's M65.4  Treatment Diagnosis: Bilateral wrist pain-  V20.938  Insurance/Certification information:  PT Insurance Information: [employer]  Physician Information:  Referring Provider (secondary): Day  Has the plan of care been signed (Y/N):        []  Yes  [x]  No     Date of Patient follow up with Physician: 2023      Is this a Progress Report:     []  Yes  [x]  No        If Yes:  Date Range for reporting period:  Beginning 2023   Ending 2023    Progress report will be due (10 Rx or 30 days whichever is less): 2023          Visit # Insurance Allowable Auth Required   6 30 []  Yes [x]  No      Latex Allergy:  [x]NO      []YES  Preferred Language for Healthcare:   [x]English       []other:     Functional Scale: UEFI-83.75%, QD- 22.7%, work QD-6.25%, sports- 31.25%   Date assessed: 2023     Pain level:  4/10 on left. 2/10 on right. SUBJECTIVE:  She missed 2 days of her HEP. She noticed a difference with this. It feels better this morning as she did them yesterday. She has been not wearing the braces, and that has been fine. The band exercises are going well. The pronation/supination stretches she can only do one set. She has to take it easy if she hasn't done the HEP.       OBJECTIVE: 23  Observation:   Test measurements:    ROM PROM AROM Comments    Left Right Left Right    Elbow flexion   143 139    Elbow extension   Hyper 2 0    Pronation   72 80    Supination   79 81 some pain    Wrist flexion   78 pain  42 pain    Wrist extension

## 2023-06-12 ENCOUNTER — APPOINTMENT (OUTPATIENT)
Dept: PHYSICAL THERAPY | Age: 23
End: 2023-06-12
Payer: COMMERCIAL

## 2023-06-22 ENCOUNTER — APPOINTMENT (OUTPATIENT)
Dept: PHYSICAL THERAPY | Age: 23
End: 2023-06-22
Payer: COMMERCIAL

## 2023-06-29 ENCOUNTER — APPOINTMENT (OUTPATIENT)
Dept: PHYSICAL THERAPY | Age: 23
End: 2023-06-29
Payer: COMMERCIAL

## 2023-07-08 DIAGNOSIS — F41.9 ANXIETY: ICD-10-CM

## 2023-07-10 RX ORDER — SERTRALINE HYDROCHLORIDE 25 MG/1
TABLET, FILM COATED ORAL
Qty: 90 TABLET | Refills: 1 | Status: SHIPPED | OUTPATIENT
Start: 2023-07-10

## 2023-07-17 ENCOUNTER — TELEPHONE (OUTPATIENT)
Dept: OBGYN CLINIC | Age: 23
End: 2023-07-17

## 2023-07-17 NOTE — TELEPHONE ENCOUNTER
Pt is 1 year post partum and is currently breast feeding. Pt stated for about a week she has been having pain in her left breast while pumping or feeding, pt also reports tender red nipples. Pt denies fever, body aches/chills. Pt states baby is growing a tooth now and maybe asking if the new pain is from baby latching differently? Pt reports no hard areas on breast, pt does not think clogged duct. Informed pt to use warm/cold compresses, massage technique, tylenol.   Please Advise

## 2023-07-18 NOTE — TELEPHONE ENCOUNTER
Recommendations as above. Will need to be evaluated if symptoms persist.  Needs appointment.   Thanks

## 2023-07-18 NOTE — TELEPHONE ENCOUNTER
Pt stated she has started using a different breast pump and is already noticing improvement in her breast discomfort. Pt will call back if she decides she needs an appt.

## 2023-08-18 ENCOUNTER — PATIENT MESSAGE (OUTPATIENT)
Dept: FAMILY MEDICINE CLINIC | Age: 23
End: 2023-08-18

## 2023-08-18 DIAGNOSIS — Z00.00 LABORATORY EXAM ORDERED AS PART OF ROUTINE GENERAL MEDICAL EXAMINATION: Primary | ICD-10-CM

## 2023-09-06 ENCOUNTER — OFFICE VISIT (OUTPATIENT)
Dept: FAMILY MEDICINE CLINIC | Age: 23
End: 2023-09-06
Payer: COMMERCIAL

## 2023-09-06 VITALS
HEART RATE: 74 BPM | DIASTOLIC BLOOD PRESSURE: 76 MMHG | OXYGEN SATURATION: 99 % | RESPIRATION RATE: 16 BRPM | WEIGHT: 155 LBS | BODY MASS INDEX: 24.91 KG/M2 | SYSTOLIC BLOOD PRESSURE: 108 MMHG | HEIGHT: 66 IN

## 2023-09-06 DIAGNOSIS — Z00.00 WELL ADULT HEALTH CHECK: Primary | ICD-10-CM

## 2023-09-06 DIAGNOSIS — F41.9 ANXIETY: ICD-10-CM

## 2023-09-06 DIAGNOSIS — Z00.00 LABORATORY EXAM ORDERED AS PART OF ROUTINE GENERAL MEDICAL EXAMINATION: ICD-10-CM

## 2023-09-06 LAB
ALBUMIN SERPL-MCNC: 4.8 G/DL (ref 3.4–5)
ALBUMIN/GLOB SERPL: 2 {RATIO} (ref 1.1–2.2)
ALP SERPL-CCNC: 72 U/L (ref 40–129)
ALT SERPL-CCNC: 19 U/L (ref 10–40)
ANION GAP SERPL CALCULATED.3IONS-SCNC: 10 MMOL/L (ref 3–16)
AST SERPL-CCNC: 22 U/L (ref 15–37)
BILIRUB SERPL-MCNC: 0.4 MG/DL (ref 0–1)
BUN SERPL-MCNC: 10 MG/DL (ref 7–20)
CALCIUM SERPL-MCNC: 9.5 MG/DL (ref 8.3–10.6)
CHLORIDE SERPL-SCNC: 102 MMOL/L (ref 99–110)
CHOLEST SERPL-MCNC: 189 MG/DL (ref 0–199)
CO2 SERPL-SCNC: 28 MMOL/L (ref 21–32)
CREAT SERPL-MCNC: 0.7 MG/DL (ref 0.6–1.1)
GFR SERPLBLD CREATININE-BSD FMLA CKD-EPI: >60 ML/MIN/{1.73_M2}
GLUCOSE SERPL-MCNC: 78 MG/DL (ref 70–99)
HDLC SERPL-MCNC: 61 MG/DL (ref 40–60)
LDLC SERPL CALC-MCNC: 116 MG/DL
POTASSIUM SERPL-SCNC: 4.2 MMOL/L (ref 3.5–5.1)
PROT SERPL-MCNC: 7.2 G/DL (ref 6.4–8.2)
SODIUM SERPL-SCNC: 140 MMOL/L (ref 136–145)
TRIGL SERPL-MCNC: 58 MG/DL (ref 0–150)

## 2023-09-06 PROCEDURE — 99395 PREV VISIT EST AGE 18-39: CPT | Performed by: FAMILY MEDICINE

## 2023-09-18 ENCOUNTER — TELEPHONE (OUTPATIENT)
Dept: FAMILY MEDICINE CLINIC | Age: 23
End: 2023-09-18

## 2023-09-18 DIAGNOSIS — F41.9 ANXIETY: ICD-10-CM

## 2023-09-18 RX ORDER — SERTRALINE HYDROCHLORIDE 25 MG/1
TABLET, FILM COATED ORAL
Qty: 90 TABLET | Refills: 1 | Status: CANCELLED | OUTPATIENT
Start: 2023-09-18

## 2023-09-18 RX ORDER — FLUCONAZOLE 150 MG/1
150 TABLET ORAL ONCE
Qty: 2 TABLET | Refills: 0 | Status: SHIPPED | OUTPATIENT
Start: 2023-09-18 | End: 2023-09-18

## 2023-09-18 NOTE — TELEPHONE ENCOUNTER
[facility] appears to be sleeping at this time as manifested by eyes being closed. Respirations easy. No distress noted. Will continue to monitor.

## 2023-09-18 NOTE — TELEPHONE ENCOUNTER
Pt is out of town in [place]   Before she left on Friday she started with itchiness, sore ness, burning when urinating ,   She is Prone to yeast infections   She has never had a UTI  Was drinking less water due to traveling   Did do a monistat for 1 day- did that on Friday   She is trying to drink some more water  Please advise what she can do while she is out of town

## 2023-09-24 ENCOUNTER — PATIENT MESSAGE (OUTPATIENT)
Dept: FAMILY MEDICINE CLINIC | Age: 23
End: 2023-09-24

## 2023-09-25 RX ORDER — NITROFURANTOIN 25; 75 MG/1; MG/1
100 CAPSULE ORAL 2 TIMES DAILY
Qty: 10 CAPSULE | Refills: 0 | Status: SHIPPED | OUTPATIENT
Start: 2023-09-25 | End: 2023-09-30

## 2023-10-09 DIAGNOSIS — F41.9 ANXIETY: ICD-10-CM

## 2023-10-09 RX ORDER — SERTRALINE HYDROCHLORIDE 25 MG/1
TABLET, FILM COATED ORAL
Qty: 90 TABLET | Refills: 1 | Status: SHIPPED | OUTPATIENT
Start: 2023-10-09

## 2023-10-22 ENCOUNTER — NURSE TRIAGE (OUTPATIENT)
Dept: OTHER | Facility: CLINIC | Age: 23
End: 2023-10-22

## 2023-10-22 ENCOUNTER — HOSPITAL ENCOUNTER (EMERGENCY)
Age: 23
Discharge: HOME | End: 2023-10-22
Payer: COMMERCIAL

## 2023-10-22 ENCOUNTER — APPOINTMENT (OUTPATIENT)
Dept: GENERAL RADIOLOGY | Age: 23
End: 2023-10-22
Payer: COMMERCIAL

## 2023-10-22 VITALS
TEMPERATURE: 98.1 F | RESPIRATION RATE: 16 BRPM | DIASTOLIC BLOOD PRESSURE: 65 MMHG | HEART RATE: 95 BPM | SYSTOLIC BLOOD PRESSURE: 101 MMHG | OXYGEN SATURATION: 98 %

## 2023-10-22 DIAGNOSIS — R50.9 FEVER IN ADULT: ICD-10-CM

## 2023-10-22 DIAGNOSIS — E86.0 DEHYDRATION: ICD-10-CM

## 2023-10-22 DIAGNOSIS — R74.8 ELEVATED LIVER ENZYMES: ICD-10-CM

## 2023-10-22 DIAGNOSIS — B34.9 VIRAL ILLNESS: Primary | ICD-10-CM

## 2023-10-22 LAB
ALBUMIN SERPL-MCNC: 4.2 G/DL (ref 3.4–5)
ALBUMIN/GLOB SERPL: 1.2 {RATIO} (ref 1.1–2.2)
ALP SERPL-CCNC: 90 U/L (ref 40–129)
ALT SERPL-CCNC: 66 U/L (ref 10–40)
ANION GAP SERPL CALCULATED.3IONS-SCNC: 16 MMOL/L (ref 3–16)
AST SERPL-CCNC: 42 U/L (ref 15–37)
BACTERIA URNS QL MICRO: ABNORMAL /HPF
BASOPHILS # BLD: 0.1 K/UL (ref 0–0.2)
BASOPHILS NFR BLD: 0.3 %
BILIRUB SERPL-MCNC: 0.7 MG/DL (ref 0–1)
BILIRUB UR QL STRIP.AUTO: NEGATIVE
BUN SERPL-MCNC: 5 MG/DL (ref 7–20)
CALCIUM SERPL-MCNC: 9.5 MG/DL (ref 8.3–10.6)
CHLORIDE SERPL-SCNC: 98 MMOL/L (ref 99–110)
CLARITY UR: ABNORMAL
CO2 SERPL-SCNC: 19 MMOL/L (ref 21–32)
COLOR UR: YELLOW
CREAT SERPL-MCNC: <0.5 MG/DL (ref 0.6–1.1)
DEPRECATED RDW RBC AUTO: 12.8 % (ref 12.4–15.4)
EOSINOPHIL # BLD: 0 K/UL (ref 0–0.6)
EOSINOPHIL NFR BLD: 0 %
EPI CELLS #/AREA URNS AUTO: 13 /HPF (ref 0–5)
FLUAV RNA UPPER RESP QL NAA+PROBE: NEGATIVE
FLUBV AG NPH QL: NEGATIVE
GFR SERPLBLD CREATININE-BSD FMLA CKD-EPI: >60 ML/MIN/{1.73_M2}
GLUCOSE SERPL-MCNC: 94 MG/DL (ref 70–99)
GLUCOSE UR STRIP.AUTO-MCNC: NEGATIVE MG/DL
HCG SERPL QL: NEGATIVE
HCT VFR BLD AUTO: 36.2 % (ref 36–48)
HETEROPH AB BLD QL IA: NEGATIVE
HGB BLD-MCNC: 12.6 G/DL (ref 12–16)
HGB UR QL STRIP.AUTO: NEGATIVE
HYALINE CASTS #/AREA URNS AUTO: 1 /LPF (ref 0–8)
KETONES UR STRIP.AUTO-MCNC: >=160 MG/DL
LACTATE BLDV-SCNC: 1 MMOL/L (ref 0.4–2)
LEUKOCYTE ESTERASE UR QL STRIP.AUTO: ABNORMAL
LIPASE SERPL-CCNC: 20 U/L (ref 13–60)
LYMPHOCYTES # BLD: 1.2 K/UL (ref 1–5.1)
LYMPHOCYTES NFR BLD: 5.6 %
MAGNESIUM SERPL-MCNC: 1.8 MG/DL (ref 1.8–2.4)
MCH RBC QN AUTO: 31.2 PG (ref 26–34)
MCHC RBC AUTO-ENTMCNC: 34.8 G/DL (ref 31–36)
MCV RBC AUTO: 89.7 FL (ref 80–100)
MONOCYTES # BLD: 0.8 K/UL (ref 0–1.3)
MONOCYTES NFR BLD: 3.6 %
NEUTROPHILS # BLD: 18.9 K/UL (ref 1.7–7.7)
NEUTROPHILS NFR BLD: 90.5 %
NITRITE UR QL STRIP.AUTO: NEGATIVE
PH UR STRIP.AUTO: 7.5 [PH] (ref 5–8)
PLATELET # BLD AUTO: 283 K/UL (ref 135–450)
PMV BLD AUTO: 7.7 FL (ref 5–10.5)
POTASSIUM SERPL-SCNC: 3.6 MMOL/L (ref 3.5–5.1)
PROT SERPL-MCNC: 7.8 G/DL (ref 6.4–8.2)
PROT UR STRIP.AUTO-MCNC: NEGATIVE MG/DL
RBC # BLD AUTO: 4.04 M/UL (ref 4–5.2)
RBC CLUMPS #/AREA URNS AUTO: 0 /HPF (ref 0–4)
RSV AG NOSE QL: NEGATIVE
S PYO AG THROAT QL: NEGATIVE
SARS-COV-2 RDRP RESP QL NAA+PROBE: NOT DETECTED
SODIUM SERPL-SCNC: 133 MMOL/L (ref 136–145)
SP GR UR STRIP.AUTO: 1.01 (ref 1–1.03)
UA COMPLETE W REFLEX CULTURE PNL UR: YES
UA DIPSTICK W REFLEX MICRO PNL UR: YES
URN SPEC COLLECT METH UR: ABNORMAL
UROBILINOGEN UR STRIP-ACNC: 1 E.U./DL
WBC # BLD AUTO: 20.9 K/UL (ref 4–11)
WBC #/AREA URNS AUTO: 15 /HPF (ref 0–5)

## 2023-10-22 PROCEDURE — 83735 ASSAY OF MAGNESIUM: CPT

## 2023-10-22 PROCEDURE — 96375 TX/PRO/DX INJ NEW DRUG ADDON: CPT

## 2023-10-22 PROCEDURE — 6370000000 HC RX 637 (ALT 250 FOR IP): Performed by: PHYSICIAN ASSISTANT

## 2023-10-22 PROCEDURE — 93005 ELECTROCARDIOGRAM TRACING: CPT | Performed by: PHYSICIAN ASSISTANT

## 2023-10-22 PROCEDURE — 87081 CULTURE SCREEN ONLY: CPT

## 2023-10-22 PROCEDURE — 71045 X-RAY EXAM CHEST 1 VIEW: CPT

## 2023-10-22 PROCEDURE — 85025 COMPLETE CBC W/AUTO DIFF WBC: CPT

## 2023-10-22 PROCEDURE — 87635 SARS-COV-2 COVID-19 AMP PRB: CPT

## 2023-10-22 PROCEDURE — 6360000002 HC RX W HCPCS: Performed by: PHYSICIAN ASSISTANT

## 2023-10-22 PROCEDURE — 83605 ASSAY OF LACTIC ACID: CPT

## 2023-10-22 PROCEDURE — 87807 RSV ASSAY W/OPTIC: CPT

## 2023-10-22 PROCEDURE — 81001 URINALYSIS AUTO W/SCOPE: CPT

## 2023-10-22 PROCEDURE — 99285 EMERGENCY DEPT VISIT HI MDM: CPT

## 2023-10-22 PROCEDURE — 87880 STREP A ASSAY W/OPTIC: CPT

## 2023-10-22 PROCEDURE — 87086 URINE CULTURE/COLONY COUNT: CPT

## 2023-10-22 PROCEDURE — 86308 HETEROPHILE ANTIBODY SCREEN: CPT

## 2023-10-22 PROCEDURE — 83690 ASSAY OF LIPASE: CPT

## 2023-10-22 PROCEDURE — 36415 COLL VENOUS BLD VENIPUNCTURE: CPT

## 2023-10-22 PROCEDURE — 87804 INFLUENZA ASSAY W/OPTIC: CPT

## 2023-10-22 PROCEDURE — 80053 COMPREHEN METABOLIC PANEL: CPT

## 2023-10-22 PROCEDURE — 84703 CHORIONIC GONADOTROPIN ASSAY: CPT

## 2023-10-22 PROCEDURE — 2580000003 HC RX 258: Performed by: PHYSICIAN ASSISTANT

## 2023-10-22 PROCEDURE — 96374 THER/PROPH/DIAG INJ IV PUSH: CPT

## 2023-10-22 RX ORDER — ONDANSETRON 2 MG/ML
4 INJECTION INTRAMUSCULAR; INTRAVENOUS ONCE
Status: COMPLETED | OUTPATIENT
Start: 2023-10-22 | End: 2023-10-22

## 2023-10-22 RX ORDER — CEFUROXIME AXETIL 250 MG/1
250 TABLET ORAL 2 TIMES DAILY
Qty: 14 TABLET | Refills: 0 | Status: SHIPPED | OUTPATIENT
Start: 2023-10-22 | End: 2023-10-27

## 2023-10-22 RX ORDER — ONDANSETRON 4 MG/1
4 TABLET, ORALLY DISINTEGRATING ORAL EVERY 8 HOURS PRN
Qty: 10 TABLET | Refills: 0 | Status: SHIPPED | OUTPATIENT
Start: 2023-10-22 | End: 2023-10-28 | Stop reason: SDUPTHER

## 2023-10-22 RX ORDER — SODIUM CHLORIDE, SODIUM LACTATE, POTASSIUM CHLORIDE, AND CALCIUM CHLORIDE .6; .31; .03; .02 G/100ML; G/100ML; G/100ML; G/100ML
1000 INJECTION, SOLUTION INTRAVENOUS ONCE
Status: COMPLETED | OUTPATIENT
Start: 2023-10-22 | End: 2023-10-22

## 2023-10-22 RX ORDER — 0.9 % SODIUM CHLORIDE 0.9 %
1000 INTRAVENOUS SOLUTION INTRAVENOUS ONCE
Status: COMPLETED | OUTPATIENT
Start: 2023-10-22 | End: 2023-10-22

## 2023-10-22 RX ORDER — ACETAMINOPHEN 500 MG
1000 TABLET ORAL ONCE
Status: COMPLETED | OUTPATIENT
Start: 2023-10-22 | End: 2023-10-22

## 2023-10-22 RX ORDER — KETOROLAC TROMETHAMINE 15 MG/ML
15 INJECTION, SOLUTION INTRAMUSCULAR; INTRAVENOUS ONCE
Status: COMPLETED | OUTPATIENT
Start: 2023-10-22 | End: 2023-10-22

## 2023-10-22 RX ADMIN — SODIUM CHLORIDE, POTASSIUM CHLORIDE, SODIUM LACTATE AND CALCIUM CHLORIDE 1000 ML: 600; 310; 30; 20 INJECTION, SOLUTION INTRAVENOUS at 16:20

## 2023-10-22 RX ADMIN — CEFTRIAXONE 1000 MG: 1 INJECTION, POWDER, FOR SOLUTION INTRAMUSCULAR; INTRAVENOUS at 18:19

## 2023-10-22 RX ADMIN — KETOROLAC TROMETHAMINE 15 MG: 15 INJECTION, SOLUTION INTRAMUSCULAR; INTRAVENOUS at 16:25

## 2023-10-22 RX ADMIN — ACETAMINOPHEN 1000 MG: 500 TABLET ORAL at 16:24

## 2023-10-22 RX ADMIN — ONDANSETRON 4 MG: 2 INJECTION INTRAMUSCULAR; INTRAVENOUS at 16:22

## 2023-10-22 RX ADMIN — SODIUM CHLORIDE 1000 ML: 9 INJECTION, SOLUTION INTRAVENOUS at 16:00

## 2023-10-22 ASSESSMENT — PAIN - FUNCTIONAL ASSESSMENT: PAIN_FUNCTIONAL_ASSESSMENT: 0-10

## 2023-10-22 ASSESSMENT — PAIN SCALES - GENERAL
PAINLEVEL_OUTOF10: 10
PAINLEVEL_OUTOF10: 10
PAINLEVEL_OUTOF10: 5

## 2023-10-22 NOTE — ED NOTES
Discharge and education instructions reviewed. Patient verbalized understanding, teach-back successful. Patient denied questions at this time. No acute distress noted. Patient instructed to follow-up as noted - return to emergency department if symptoms worsen. Patient verbalized understanding. Discharged per EDMD with discharge instructions.        Bernard Escobar RN  10/22/23 [id number]

## 2023-10-22 NOTE — DISCHARGE INSTRUCTIONS
Urinalysis showed evidence of a potential UTI. Cloudy character, small leukocyte, bacteria 3+ WBC at 15. Culture pending. Rocephin 1 g IVPB initiated. I will send prescription to your pharmacy for Ceftin 250 mg take this twice daily for the next 1 week. Your first dose will be tomorrow. WBC was a bit elevated at 20.9. I believe this related to your overall infection. Your initial temp was 103. 2. After medication and fluids your temp is normal at 98.1. Initial heart rate was 136. After treatment your heart rate is 98 and this will continue to improve. I do recommend that you maintain good fever control using ibuprofen 600 mg and alternating with Tylenol 1000 mg every 4-6 hours take the other product. I do recommend you maintain improved hydration over the next several days. Your urine ketones were greater than 160. This indicates rather significant dehydration. No kidney damage. We did give you 2 bags of fluid. You felt improved overall with treatment. I recommend return to work on Tuesday. Rest tomorrow Monday.

## 2023-10-23 ENCOUNTER — NURSE TRIAGE (OUTPATIENT)
Dept: OTHER | Facility: CLINIC | Age: 23
End: 2023-10-23

## 2023-10-23 ENCOUNTER — PATIENT MESSAGE (OUTPATIENT)
Dept: FAMILY MEDICINE CLINIC | Age: 23
End: 2023-10-23

## 2023-10-23 LAB
BACTERIA UR CULT: NORMAL
EKG ATRIAL RATE: 125 BPM
EKG DIAGNOSIS: NORMAL
EKG P AXIS: 74 DEGREES
EKG P-R INTERVAL: 146 MS
EKG Q-T INTERVAL: 296 MS
EKG QRS DURATION: 80 MS
EKG QTC CALCULATION (BAZETT): 427 MS
EKG R AXIS: 81 DEGREES
EKG T AXIS: 6 DEGREES
EKG VENTRICULAR RATE: 125 BPM

## 2023-10-23 PROCEDURE — 93010 ELECTROCARDIOGRAM REPORT: CPT | Performed by: INTERNAL MEDICINE

## 2023-10-24 NOTE — TELEPHONE ENCOUNTER
Location of patient: [place]    Subjective: Caller states \"My wife was in hospital yesterday for flu like sx, dehydration, got taken care of. But tonight has thrown up and is feeling nausea, temp 100.3.  she has abx and nausea medicine. \"     Current Symptoms: on abx for UTI. Had temp yesterday 103. 7.  took zofran 6 PM, vomited 8:30. Body aches and joint pain still, but less than yesterday. 100.3 at 8:30, took Acetaminophen at 8PM.  Temp now 100.7.  denies flank pain. Just very nauseated. One episode of vomiting. Recommend she call provider to see if different med for nausea can help. Onset: 30 minutes ago; sudden    Pain Severity: 0/10; N/A; none    Temperature: 100.7 by unknown method    What has been tried: Acetaminophen     Recommended disposition:  call provider    Care advice provided, patient verbalizes understanding; denies any other questions or concerns; instructed to call back for any new or worsening symptoms. Patient/caller agrees to follow-up with PCP     Attention Provider: Thank you for allowing me to participate in the care of your patient. The patient was connected to triage in response to symptoms provided. Please do not respond through this encounter as the response is not directed to a shared pool.     Reason for Disposition   [1] Taking antibiotic < 24 hours for UTI AND [2] fever persists (still has fever)   Vomiting a prescription medication    Protocols used: Urinary Tract Infection on Antibiotic Follow-up Call - Female-ADULT-, Vomiting-ADULT-

## 2023-10-25 LAB — S PYO THROAT QL CULT: NORMAL

## 2023-10-27 ENCOUNTER — OFFICE VISIT (OUTPATIENT)
Dept: FAMILY MEDICINE CLINIC | Age: 23
End: 2023-10-27
Payer: COMMERCIAL

## 2023-10-27 ENCOUNTER — TELEPHONE (OUTPATIENT)
Dept: FAMILY MEDICINE CLINIC | Age: 23
End: 2023-10-27

## 2023-10-27 VITALS
OXYGEN SATURATION: 98 % | TEMPERATURE: 99 F | RESPIRATION RATE: 12 BRPM | DIASTOLIC BLOOD PRESSURE: 68 MMHG | BODY MASS INDEX: 24.53 KG/M2 | HEART RATE: 104 BPM | SYSTOLIC BLOOD PRESSURE: 120 MMHG | WEIGHT: 152 LBS

## 2023-10-27 DIAGNOSIS — R53.81 MALAISE AND FATIGUE: ICD-10-CM

## 2023-10-27 DIAGNOSIS — R50.9 FEVER, UNSPECIFIED: ICD-10-CM

## 2023-10-27 DIAGNOSIS — R50.9 FEVER, UNSPECIFIED: Primary | ICD-10-CM

## 2023-10-27 DIAGNOSIS — R53.83 MALAISE AND FATIGUE: ICD-10-CM

## 2023-10-27 DIAGNOSIS — M25.50 ARTHRALGIA, UNSPECIFIED JOINT: ICD-10-CM

## 2023-10-27 LAB
BASOPHILS # BLD: 0.1 K/UL (ref 0–0.2)
BASOPHILS NFR BLD: 0.5 %
BILIRUBIN, POC: NORMAL
BLOOD URINE, POC: NORMAL
CLARITY, POC: CLEAR
COLOR, POC: YELLOW
DEPRECATED RDW RBC AUTO: 13 % (ref 12.4–15.4)
EOSINOPHIL # BLD: 0.1 K/UL (ref 0–0.6)
EOSINOPHIL NFR BLD: 0.5 %
GLUCOSE URINE, POC: NORMAL
HCT VFR BLD AUTO: 30.5 % (ref 36–48)
HGB BLD-MCNC: 10.3 G/DL (ref 12–16)
KETONES, POC: NORMAL
LEUKOCYTE EST, POC: NORMAL
LYMPHOCYTES # BLD: 1.4 K/UL (ref 1–5.1)
LYMPHOCYTES NFR BLD: 10.4 %
MCH RBC QN AUTO: 30.9 PG (ref 26–34)
MCHC RBC AUTO-ENTMCNC: 33.9 G/DL (ref 31–36)
MCV RBC AUTO: 91.2 FL (ref 80–100)
MONOCYTES # BLD: 0.7 K/UL (ref 0–1.3)
MONOCYTES NFR BLD: 5.5 %
NEUTROPHILS # BLD: 11.3 K/UL (ref 1.7–7.7)
NEUTROPHILS NFR BLD: 83.1 %
NITRITE, POC: NORMAL
PH, POC: 7
PLATELET # BLD AUTO: 479 K/UL (ref 135–450)
PMV BLD AUTO: 7.7 FL (ref 5–10.5)
PROTEIN, POC: NORMAL
RBC # BLD AUTO: 3.34 M/UL (ref 4–5.2)
SPECIFIC GRAVITY, POC: 1.01
UROBILINOGEN, POC: 0.2
WBC # BLD AUTO: 13.6 K/UL (ref 4–11)

## 2023-10-27 PROCEDURE — 81002 URINALYSIS NONAUTO W/O SCOPE: CPT | Performed by: FAMILY MEDICINE

## 2023-10-27 PROCEDURE — 99214 OFFICE O/P EST MOD 30 MIN: CPT | Performed by: FAMILY MEDICINE

## 2023-10-27 RX ORDER — FLUCONAZOLE 150 MG/1
150 TABLET ORAL ONCE
Qty: 2 TABLET | Refills: 0 | Status: SHIPPED | OUTPATIENT
Start: 2023-10-27 | End: 2023-10-27

## 2023-10-27 NOTE — TELEPHONE ENCOUNTER
Pt called in had a fever and a virus since last Friday said she was my charting with Dr Lofton   Pt still has a fever is on antibiotics   Pt now has a yeast infection and was hoping something would be called in to pharmacy     Please advise

## 2023-10-27 NOTE — TELEPHONE ENCOUNTER
Diflucan sent  I am concerned that she's had a fever for this long.   Is she able to come in today at 3:40 (just opened up) or at 4pm?

## 2023-10-28 LAB
ALBUMIN SERPL-MCNC: 3.9 G/DL (ref 3.4–5)
ALBUMIN/GLOB SERPL: 1.4 {RATIO} (ref 1.1–2.2)
ALP SERPL-CCNC: 266 U/L (ref 40–129)
ALT SERPL-CCNC: 86 U/L (ref 10–40)
ANION GAP SERPL CALCULATED.3IONS-SCNC: 12 MMOL/L (ref 3–16)
AST SERPL-CCNC: 61 U/L (ref 15–37)
BILIRUB SERPL-MCNC: <0.2 MG/DL (ref 0–1)
BUN SERPL-MCNC: 6 MG/DL (ref 7–20)
CALCIUM SERPL-MCNC: 9.1 MG/DL (ref 8.3–10.6)
CHLORIDE SERPL-SCNC: 98 MMOL/L (ref 99–110)
CO2 SERPL-SCNC: 25 MMOL/L (ref 21–32)
CREAT SERPL-MCNC: <0.5 MG/DL (ref 0.6–1.1)
GFR SERPLBLD CREATININE-BSD FMLA CKD-EPI: >60 ML/MIN/{1.73_M2}
GLUCOSE SERPL-MCNC: 109 MG/DL (ref 70–99)
POTASSIUM SERPL-SCNC: 4.3 MMOL/L (ref 3.5–5.1)
PROT SERPL-MCNC: 6.7 G/DL (ref 6.4–8.2)
SODIUM SERPL-SCNC: 135 MMOL/L (ref 136–145)

## 2023-10-28 RX ORDER — ONDANSETRON 4 MG/1
4 TABLET, ORALLY DISINTEGRATING ORAL EVERY 8 HOURS PRN
Qty: 15 TABLET | Refills: 1 | Status: SHIPPED | OUTPATIENT
Start: 2023-10-28

## 2023-10-29 LAB — BACTERIA UR CULT: NORMAL

## 2023-10-30 ENCOUNTER — PATIENT MESSAGE (OUTPATIENT)
Dept: FAMILY MEDICINE CLINIC | Age: 23
End: 2023-10-30

## 2023-10-30 DIAGNOSIS — D72.829 LEUKOCYTOSIS, UNSPECIFIED TYPE: ICD-10-CM

## 2023-10-30 DIAGNOSIS — R50.9 FEVER, UNSPECIFIED FEVER CAUSE: ICD-10-CM

## 2023-10-30 DIAGNOSIS — R79.89 ELEVATED LFTS: Primary | ICD-10-CM

## 2023-10-30 DIAGNOSIS — R79.89 ELEVATED LFTS: ICD-10-CM

## 2023-10-30 DIAGNOSIS — M25.50 POLYARTHRALGIA: ICD-10-CM

## 2023-10-30 LAB
ALBUMIN SERPL-MCNC: 3.8 G/DL (ref 3.4–5)
ALBUMIN/GLOB SERPL: 1.2 {RATIO} (ref 1.1–2.2)
ALP SERPL-CCNC: 240 U/L (ref 40–129)
ALT SERPL-CCNC: 66 U/L (ref 10–40)
ANION GAP SERPL CALCULATED.3IONS-SCNC: 12 MMOL/L (ref 3–16)
AST SERPL-CCNC: 29 U/L (ref 15–37)
BASOPHILS # BLD: 0.1 K/UL (ref 0–0.2)
BASOPHILS NFR BLD: 0.8 %
BILIRUB SERPL-MCNC: <0.2 MG/DL (ref 0–1)
BUN SERPL-MCNC: 5 MG/DL (ref 7–20)
CALCIUM SERPL-MCNC: 9.1 MG/DL (ref 8.3–10.6)
CHLORIDE SERPL-SCNC: 99 MMOL/L (ref 99–110)
CO2 SERPL-SCNC: 27 MMOL/L (ref 21–32)
CREAT SERPL-MCNC: <0.5 MG/DL (ref 0.6–1.1)
DEPRECATED RDW RBC AUTO: 13.2 % (ref 12.4–15.4)
EOSINOPHIL # BLD: 0.1 K/UL (ref 0–0.6)
EOSINOPHIL NFR BLD: 0.6 %
GFR SERPLBLD CREATININE-BSD FMLA CKD-EPI: >60 ML/MIN/{1.73_M2}
GLUCOSE SERPL-MCNC: 85 MG/DL (ref 70–99)
HCT VFR BLD AUTO: 30 % (ref 36–48)
HGB BLD-MCNC: 10.2 G/DL (ref 12–16)
LYMPHOCYTES # BLD: 2 K/UL (ref 1–5.1)
LYMPHOCYTES NFR BLD: 15.2 %
MCH RBC QN AUTO: 31 PG (ref 26–34)
MCHC RBC AUTO-ENTMCNC: 34.1 G/DL (ref 31–36)
MCV RBC AUTO: 91.1 FL (ref 80–100)
MONOCYTES # BLD: 0.7 K/UL (ref 0–1.3)
MONOCYTES NFR BLD: 5.5 %
NEUTROPHILS # BLD: 10.4 K/UL (ref 1.7–7.7)
NEUTROPHILS NFR BLD: 77.9 %
PLATELET # BLD AUTO: 650 K/UL (ref 135–450)
PMV BLD AUTO: 7.3 FL (ref 5–10.5)
POTASSIUM SERPL-SCNC: 4.5 MMOL/L (ref 3.5–5.1)
PROT SERPL-MCNC: 6.9 G/DL (ref 6.4–8.2)
RBC # BLD AUTO: 3.29 M/UL (ref 4–5.2)
RHEUMATOID FACT SER IA-ACNC: <10 IU/ML
SODIUM SERPL-SCNC: 138 MMOL/L (ref 136–145)
TSH SERPL DL<=0.005 MIU/L-ACNC: 1.23 UIU/ML (ref 0.27–4.2)
WBC # BLD AUTO: 13.3 K/UL (ref 4–11)

## 2023-10-31 LAB
ANA SER QL IA: NEGATIVE
CCP IGG SERPL-ACNC: <0.5 U/ML (ref 0–2.9)

## 2023-11-01 LAB
CMV IGM SERPL IA-ACNC: <8 AU/ML
EBV EA-D IGG SER-ACNC: <5 U/ML (ref 0–10.9)
EBV NA IGG SER IA-ACNC: >600 U/ML (ref 0–21.9)
EBV VCA IGG SER IA-ACNC: 621 U/ML (ref 0–21.9)
EBV VCA IGM SER IA-ACNC: 13.1 U/ML (ref 0–43.9)

## 2023-11-01 RX ORDER — AMOXICILLIN AND CLAVULANATE POTASSIUM 875; 125 MG/1; MG/1
1 TABLET, FILM COATED ORAL 2 TIMES DAILY
Qty: 20 TABLET | Refills: 0 | Status: SHIPPED | OUTPATIENT
Start: 2023-11-01 | End: 2023-11-11

## 2023-11-02 DIAGNOSIS — D72.829 LEUKOCYTOSIS, UNSPECIFIED TYPE: ICD-10-CM

## 2023-11-02 DIAGNOSIS — M25.50 POLYARTHRALGIA: ICD-10-CM

## 2023-11-02 DIAGNOSIS — R50.9 FEVER, UNSPECIFIED FEVER CAUSE: ICD-10-CM

## 2023-11-02 DIAGNOSIS — R79.89 ELEVATED LFTS: ICD-10-CM

## 2023-11-02 LAB
ALBUMIN SERPL-MCNC: 4.2 G/DL (ref 3.4–5)
ALP SERPL-CCNC: 193 U/L (ref 40–129)
ALT SERPL-CCNC: 44 U/L (ref 10–40)
AST SERPL-CCNC: 20 U/L (ref 15–37)
BILIRUB DIRECT SERPL-MCNC: <0.2 MG/DL (ref 0–0.3)
BILIRUB INDIRECT SERPL-MCNC: ABNORMAL MG/DL (ref 0–1)
BILIRUB SERPL-MCNC: <0.2 MG/DL (ref 0–1)
CRP SERPL-MCNC: 47.8 MG/L (ref 0–5.1)
PATH INTERP BLD-IMP: NORMAL
PROT SERPL-MCNC: 7.5 G/DL (ref 6.4–8.2)

## 2023-11-02 NOTE — TELEPHONE ENCOUNTER
Called and spoke with pt  Fever has been gone for about 24 hours  Arthralgias and fatigue remain  Commencing further workup for inflammatory and infectious causes, ordered  Augmentin sent given recent hx of UTI (possible false negative culture with prior antibiotics given)    Will follow results and consider prednisone

## 2023-11-03 LAB — PATH INTERP BLD-IMP: NORMAL

## 2023-11-03 RX ORDER — PREDNISONE 10 MG/1
TABLET ORAL
Qty: 25 TABLET | Refills: 0 | Status: SHIPPED | OUTPATIENT
Start: 2023-11-03

## 2023-11-04 LAB — B. BURGDORFERI VLSE1/PEPC10 ABS, ELISA: 0.62 IV

## 2023-11-05 LAB
R RICKETTSI IGG TITR SER IF: NORMAL {TITER}
R RICKETTSI IGM TITR SER IF: NORMAL {TITER}

## 2023-11-06 LAB — BACTERIA BLD CULT ORG #2: NORMAL

## 2023-11-14 LAB
B19V IGG SER IA-ACNC: 0.24 IV
B19V IGM SER IA-ACNC: 0.22 IV

## 2023-11-17 ENCOUNTER — PATIENT MESSAGE (OUTPATIENT)
Dept: FAMILY MEDICINE CLINIC | Age: 23
End: 2023-11-17

## 2023-11-17 DIAGNOSIS — F41.9 ANXIETY: ICD-10-CM

## 2023-11-20 ENCOUNTER — PATIENT MESSAGE (OUTPATIENT)
Dept: FAMILY MEDICINE CLINIC | Age: 23
End: 2023-11-20

## 2023-11-20 DIAGNOSIS — M25.531 BILATERAL WRIST PAIN: Primary | ICD-10-CM

## 2023-11-20 DIAGNOSIS — M25.532 BILATERAL WRIST PAIN: Primary | ICD-10-CM

## 2023-11-26 ENCOUNTER — PATIENT MESSAGE (OUTPATIENT)
Dept: FAMILY MEDICINE CLINIC | Age: 23
End: 2023-11-26

## 2023-11-27 RX ORDER — PREDNISONE 5 MG/1
TABLET ORAL
Qty: 48 TABLET | Refills: 0 | Status: SHIPPED | OUTPATIENT
Start: 2023-11-27

## 2023-11-28 ENCOUNTER — HOSPITAL ENCOUNTER (OUTPATIENT)
Dept: PHYSICAL THERAPY | Age: 23
Setting detail: THERAPIES SERIES
Discharge: HOME | End: 2023-11-28
Payer: COMMERCIAL

## 2023-11-28 PROCEDURE — 97110 THERAPEUTIC EXERCISES: CPT | Performed by: PHYSICAL THERAPIST

## 2023-11-28 PROCEDURE — 97162 PT EVAL MOD COMPLEX 30 MIN: CPT | Performed by: PHYSICAL THERAPIST

## 2023-11-28 PROCEDURE — 97530 THERAPEUTIC ACTIVITIES: CPT | Performed by: PHYSICAL THERAPIST

## 2023-11-28 NOTE — FLOWSHEET NOTE
[address] Performance and Rehabilitation a Department of [facility]     [address]  [address] [facility]  Office: [phone]  Fax:  [phone]        Physical Therapy: TREATMENT/PROGRESS NOTE   Patient: May Heck (49 y.o. female)   Treatment Date: 2023   :  2000 MRN: [id number]   Visit #: 1    Insurance: Payor: [facility] / Plan: [facility]jossy Rosas [facility] / Product Type: *No Product type* /   Insurance ID: [id number] - ([address] [place] [address])  Secondary Insurance (if applicable):    Treatment Diagnosis:   No diagnosis found. Medical Diagnosis:    Bilateral wrist pain [M25.531, M25.532]   Referring Physician: Bety Bradford MD  PCP: Bety Bradford MD                             Plan of care signed (Y/N):     Date of Patient follow up with Physician:      Progress Report/POC: EVAL today    POC update due (10 Rx/or 30 days whichever is less [address] [place] [address]): 26 Dec 2023     Visit # Insurance Allowable Auth Needed   1+6 30 []Yes    [x]No     Latex Allergy:  [x]NO      []YES    Preferred Language for Healthcare:   [x]English       []other:    SUBJECTIVE EXAMINATION     Patient Report/Comments: see eval     Test used Initial score  23   Pain Summary VAS 3/10 on left, right 7/10.      Functional questionnaire Upper Extremity functional Scale 35.6%    Other:                OBJECTIVE EXAMINATION     Observation:     Test measurements: see eval    ROM PROM AROM Comments    Left Right Left Right    Elbow flexion        Elbow extension        Pronation        Supination        Wrist flexion        Wrist extension        Wrist radial deviation        Wrist ulnar deviation          Strength Left Right Comments   Elbow flexion      Elbow extension      Pronation      Supination      Wrist flexion      Wrist extension      Wrist radial deviation      Wrist ulnar deviation         Left Right Comments   Level 1      Level 2      Level 3      Level 4

## 2023-12-01 ENCOUNTER — HOSPITAL ENCOUNTER (OUTPATIENT)
Dept: GENERAL RADIOLOGY | Age: 23
Discharge: HOME | End: 2023-12-01
Payer: COMMERCIAL

## 2023-12-01 ENCOUNTER — HOSPITAL ENCOUNTER (OUTPATIENT)
Dept: PHYSICAL THERAPY | Age: 23
Setting detail: THERAPIES SERIES
Discharge: HOME | End: 2023-12-01
Payer: COMMERCIAL

## 2023-12-01 DIAGNOSIS — Z83.79 FAMILY HX-GI DISORDERS: ICD-10-CM

## 2023-12-01 DIAGNOSIS — M19.90 SENILE ARTHRITIS: ICD-10-CM

## 2023-12-01 DIAGNOSIS — R79.89 HYPOURICEMIA: ICD-10-CM

## 2023-12-01 PROCEDURE — 73130 X-RAY EXAM OF HAND: CPT

## 2023-12-01 PROCEDURE — 97110 THERAPEUTIC EXERCISES: CPT

## 2023-12-01 PROCEDURE — 73522 X-RAY EXAM HIPS BI 3-4 VIEWS: CPT

## 2023-12-01 PROCEDURE — 72202 X-RAY EXAM SI JOINTS 3/> VWS: CPT

## 2023-12-01 PROCEDURE — 97140 MANUAL THERAPY 1/> REGIONS: CPT

## 2023-12-05 ENCOUNTER — APPOINTMENT (OUTPATIENT)
Dept: PHYSICAL THERAPY | Age: 23
End: 2023-12-05
Payer: COMMERCIAL

## 2023-12-08 ENCOUNTER — APPOINTMENT (OUTPATIENT)
Dept: PHYSICAL THERAPY | Age: 23
End: 2023-12-08
Payer: COMMERCIAL

## 2023-12-11 ENCOUNTER — APPOINTMENT (OUTPATIENT)
Dept: PHYSICAL THERAPY | Age: 23
End: 2023-12-11
Payer: COMMERCIAL

## 2023-12-13 ENCOUNTER — APPOINTMENT (OUTPATIENT)
Dept: PHYSICAL THERAPY | Age: 23
End: 2023-12-13
Payer: COMMERCIAL

## 2023-12-15 ENCOUNTER — OFFICE VISIT (OUTPATIENT)
Dept: FAMILY MEDICINE CLINIC | Age: 23
End: 2023-12-15
Payer: COMMERCIAL

## 2023-12-15 VITALS
RESPIRATION RATE: 16 BRPM | WEIGHT: 156 LBS | HEIGHT: 66 IN | OXYGEN SATURATION: 98 % | BODY MASS INDEX: 25.07 KG/M2 | HEART RATE: 91 BPM

## 2023-12-15 DIAGNOSIS — J01.90 ACUTE BACTERIAL SINUSITIS: Primary | ICD-10-CM

## 2023-12-15 DIAGNOSIS — B96.89 ACUTE BACTERIAL SINUSITIS: Primary | ICD-10-CM

## 2023-12-15 PROCEDURE — 99213 OFFICE O/P EST LOW 20 MIN: CPT | Performed by: FAMILY MEDICINE

## 2023-12-15 RX ORDER — AMOXICILLIN 875 MG/1
875 TABLET, COATED ORAL 2 TIMES DAILY
Qty: 14 TABLET | Refills: 0 | Status: SHIPPED | OUTPATIENT
Start: 2023-12-15 | End: 2023-12-22

## 2023-12-15 RX ORDER — FLUCONAZOLE 150 MG/1
150 TABLET ORAL ONCE
Qty: 2 TABLET | Refills: 0 | Status: SHIPPED | OUTPATIENT
Start: 2023-12-15 | End: 2023-12-15

## 2023-12-19 ENCOUNTER — APPOINTMENT (OUTPATIENT)
Dept: PHYSICAL THERAPY | Age: 23
End: 2023-12-19
Payer: COMMERCIAL

## 2023-12-21 ENCOUNTER — APPOINTMENT (OUTPATIENT)
Dept: PHYSICAL THERAPY | Age: 23
End: 2023-12-21
Payer: COMMERCIAL

## 2023-12-28 ENCOUNTER — APPOINTMENT (OUTPATIENT)
Dept: PHYSICAL THERAPY | Age: 23
End: 2023-12-28
Payer: COMMERCIAL

## 2024-01-09 ASSESSMENT — PATIENT HEALTH QUESTIONNAIRE - PHQ9
SUM OF ALL RESPONSES TO PHQ9 QUESTIONS 1 & 2: 0
SUM OF ALL RESPONSES TO PHQ9 QUESTIONS 1 & 2: 0
SUM OF ALL RESPONSES TO PHQ QUESTIONS 1-9: 0
2. FEELING DOWN, DEPRESSED OR HOPELESS: 0
1. LITTLE INTEREST OR PLEASURE IN DOING THINGS: NOT AT ALL
SUM OF ALL RESPONSES TO PHQ QUESTIONS 1-9: 0
1. LITTLE INTEREST OR PLEASURE IN DOING THINGS: 0
2. FEELING DOWN, DEPRESSED OR HOPELESS: NOT AT ALL

## 2024-01-12 ENCOUNTER — OFFICE VISIT (OUTPATIENT)
Dept: OBGYN CLINIC | Age: 24
End: 2024-01-12
Payer: COMMERCIAL

## 2024-01-12 VITALS
HEIGHT: 66 IN | WEIGHT: 154.4 LBS | SYSTOLIC BLOOD PRESSURE: 118 MMHG | BODY MASS INDEX: 24.81 KG/M2 | TEMPERATURE: 98 F | DIASTOLIC BLOOD PRESSURE: 78 MMHG | HEART RATE: 74 BPM

## 2024-01-12 DIAGNOSIS — Z98.891 HISTORY OF CESAREAN SECTION, LOW TRANSVERSE: ICD-10-CM

## 2024-01-12 DIAGNOSIS — N89.8 VAGINAL DRYNESS: Primary | ICD-10-CM

## 2024-01-12 PROCEDURE — 99212 OFFICE O/P EST SF 10 MIN: CPT | Performed by: OBSTETRICS & GYNECOLOGY

## 2024-01-12 RX ORDER — HYDROXYCHLOROQUINE SULFATE 200 MG/1
TABLET, FILM COATED ORAL DAILY
COMMUNITY

## 2024-01-13 LAB
CANDIDA DNA VAG QL NAA+PROBE: ABNORMAL
G VAGINALIS DNA SPEC QL NAA+PROBE: ABNORMAL
T VAGINALIS DNA VAG QL NAA+PROBE: ABNORMAL

## 2024-01-28 PROBLEM — Z98.891 HISTORY OF CESAREAN SECTION, LOW TRANSVERSE: Status: ACTIVE | Noted: 2022-07-20

## 2024-02-09 ENCOUNTER — PATIENT MESSAGE (OUTPATIENT)
Dept: FAMILY MEDICINE CLINIC | Age: 24
End: 2024-02-09

## 2024-02-09 DIAGNOSIS — Z32.01 POSITIVE PREGNANCY TEST: Primary | ICD-10-CM

## 2024-02-09 DIAGNOSIS — Z32.01 POSITIVE PREGNANCY TEST: ICD-10-CM

## 2024-02-09 RX ORDER — PROGESTERONE 200 MG/1
CAPSULE ORAL
Qty: 60 CAPSULE | Refills: 2 | Status: SHIPPED | OUTPATIENT
Start: 2024-02-09

## 2024-02-10 LAB
B-HCG SERPL EIA 3RD IS-ACNC: NORMAL MIU/ML
PROGEST SERPL-MCNC: 16.26 NG/ML

## 2024-02-12 RX ORDER — NITROFURANTOIN 25; 75 MG/1; MG/1
CAPSULE ORAL
Qty: 10 CAPSULE | Refills: 0 | OUTPATIENT
Start: 2024-02-12

## 2024-02-12 NOTE — TELEPHONE ENCOUNTER
Request for antibiotic  Can we see if she is having UTI symptoms or perhaps this was sent in error for another med?

## 2024-02-21 ENCOUNTER — OFFICE VISIT (OUTPATIENT)
Dept: OBGYN CLINIC | Age: 24
End: 2024-02-21

## 2024-02-21 VITALS
SYSTOLIC BLOOD PRESSURE: 122 MMHG | WEIGHT: 158 LBS | BODY MASS INDEX: 25.5 KG/M2 | TEMPERATURE: 98.4 F | OXYGEN SATURATION: 99 % | HEART RATE: 92 BPM | DIASTOLIC BLOOD PRESSURE: 78 MMHG

## 2024-02-21 DIAGNOSIS — O46.8X1 SUBCHORIONIC HEMATOMA IN FIRST TRIMESTER, SINGLE OR UNSPECIFIED FETUS: ICD-10-CM

## 2024-02-21 DIAGNOSIS — N91.2 AMENORRHEA: ICD-10-CM

## 2024-02-21 DIAGNOSIS — Z12.4 PAP SMEAR FOR CERVICAL CANCER SCREENING: ICD-10-CM

## 2024-02-21 DIAGNOSIS — O41.8X10 SUBCHORIONIC HEMATOMA IN FIRST TRIMESTER, SINGLE OR UNSPECIFIED FETUS: ICD-10-CM

## 2024-02-21 DIAGNOSIS — Z11.3 SCREENING EXAMINATION FOR STD (SEXUALLY TRANSMITTED DISEASE): ICD-10-CM

## 2024-02-21 DIAGNOSIS — Z01.419 WOMEN'S ANNUAL ROUTINE GYNECOLOGICAL EXAMINATION: Primary | ICD-10-CM

## 2024-02-21 DIAGNOSIS — Z98.891 HISTORY OF CESAREAN SECTION, LOW TRANSVERSE: ICD-10-CM

## 2024-02-22 ENCOUNTER — TELEPHONE (OUTPATIENT)
Dept: FAMILY MEDICINE CLINIC | Age: 24
End: 2024-02-22

## 2024-02-22 LAB
BACTERIA URNS QL MICRO: ABNORMAL /HPF
BILIRUB UR QL STRIP.AUTO: NEGATIVE
C TRACH DNA CVX QL NAA+PROBE: NEGATIVE
CANDIDA DNA VAG QL NAA+PROBE: ABNORMAL
CLARITY UR: CLEAR
COLOR UR: YELLOW
EPI CELLS #/AREA URNS AUTO: 2 /HPF (ref 0–5)
G VAGINALIS DNA SPEC QL NAA+PROBE: ABNORMAL
GLUCOSE UR STRIP.AUTO-MCNC: NEGATIVE MG/DL
HGB UR QL STRIP.AUTO: NEGATIVE
HYALINE CASTS #/AREA URNS AUTO: 0 /LPF (ref 0–8)
KETONES UR STRIP.AUTO-MCNC: NEGATIVE MG/DL
LEUKOCYTE ESTERASE UR QL STRIP.AUTO: ABNORMAL
N GONORRHOEA DNA CERV MUCUS QL NAA+PROBE: NEGATIVE
NITRITE UR QL STRIP.AUTO: NEGATIVE
PH UR STRIP.AUTO: 6.5 [PH] (ref 5–8)
PROT UR STRIP.AUTO-MCNC: NEGATIVE MG/DL
RBC CLUMPS #/AREA URNS AUTO: 3 /HPF (ref 0–4)
SP GR UR STRIP.AUTO: 1.01 (ref 1–1.03)
T VAGINALIS DNA VAG QL NAA+PROBE: ABNORMAL
UA DIPSTICK W REFLEX MICRO PNL UR: YES
URN SPEC COLLECT METH UR: ABNORMAL
UROBILINOGEN UR STRIP-ACNC: 0.2 E.U./DL
WBC #/AREA URNS AUTO: 2 /HPF (ref 0–5)

## 2024-02-22 RX ORDER — ONDANSETRON 4 MG/1
4 TABLET, ORALLY DISINTEGRATING ORAL EVERY 8 HOURS PRN
Qty: 15 TABLET | Refills: 1 | Status: SHIPPED | OUTPATIENT
Start: 2024-02-22

## 2024-02-22 NOTE — TELEPHONE ENCOUNTER
----- Message from Sharon You sent at 2/22/2024  8:21 AM EST -----  Regarding: I’m Pregnant! :) Labs?  Contact: [phone]  I saw the message about continuing progesterone- thanks!   Wanted to ask if I could get a new zofran prescription? The one I had before from being sick in the fall is out. Thank you!

## 2024-02-23 ENCOUNTER — TELEPHONE (OUTPATIENT)
Dept: OBGYN CLINIC | Age: 24
End: 2024-02-23

## 2024-02-23 PROBLEM — O46.8X1 SUBCHORIONIC HEMATOMA IN FIRST TRIMESTER: Status: ACTIVE | Noted: 2024-02-23

## 2024-02-23 PROBLEM — O41.8X10 SUBCHORIONIC HEMATOMA IN FIRST TRIMESTER: Status: ACTIVE | Noted: 2024-02-23

## 2024-02-23 LAB
BACTERIA UR CULT: ABNORMAL
BACTERIA UR CULT: ABNORMAL
ORGANISM: ABNORMAL

## 2024-02-23 RX ORDER — AMOXICILLIN 500 MG/1
500 CAPSULE ORAL 3 TIMES DAILY
Qty: 21 CAPSULE | Refills: 0 | Status: SHIPPED | OUTPATIENT
Start: 2024-02-23 | End: 2024-03-01

## 2024-02-23 NOTE — TELEPHONE ENCOUNTER
Rx for Terazol sent to listed pharmacy.   Patient is pregnant.  If she is not symptomatic she may want to hold off on taking medication.  Treatment is indicated in pregnancy if yeast is symptomatic. Thanks

## 2024-02-23 NOTE — TELEPHONE ENCOUNTER
Patient is aware of results, currently on a steroid, is pregnant and not symptomatic. Patient inquiring about Rx to treat yeast. Please advise.     Routing to Dr. Bunn

## 2024-02-23 NOTE — PROGRESS NOTES
Urinalysis with Microscopic    Culture, Urine      5. Subchorionic hematoma in first trimester, single or unspecified fetus        6. History of  section, low transverse                Plan:      Orders Placed This Encounter   Procedures    C.trachomatis N.gonorrhoeae DNA    Culture, Urine    Vaginal Pathogens Probes *A    PAP SMEAR    Urinalysis with Microscopic    PAP SMEAR     Continue prenatal vitamins  Review medications with rheumatology  Options for method of delivery reviewed:  TOLAC score 64%  Options for prenatal testing reviewed:  NT, quad screen, free fetal DNA. Risks and benefits discussed.   Follow up prn and 4 weeks for initial prenatal visit.             Cinthya Bunn DO

## 2024-03-18 ENCOUNTER — INITIAL PRENATAL (OUTPATIENT)
Dept: OBGYN CLINIC | Age: 24
End: 2024-03-18

## 2024-03-18 VITALS
WEIGHT: 154.8 LBS | HEIGHT: 66 IN | HEART RATE: 84 BPM | DIASTOLIC BLOOD PRESSURE: 72 MMHG | BODY MASS INDEX: 24.88 KG/M2 | SYSTOLIC BLOOD PRESSURE: 118 MMHG | TEMPERATURE: 98.1 F

## 2024-03-18 DIAGNOSIS — Z34.81 PRENATAL CARE, SUBSEQUENT PREGNANCY IN FIRST TRIMESTER: Primary | ICD-10-CM

## 2024-03-18 DIAGNOSIS — Z98.891 HISTORY OF CESAREAN SECTION, LOW TRANSVERSE: ICD-10-CM

## 2024-03-18 DIAGNOSIS — O46.8X1 SUBCHORIONIC HEMATOMA IN FIRST TRIMESTER, SINGLE OR UNSPECIFIED FETUS: ICD-10-CM

## 2024-03-18 DIAGNOSIS — O41.8X10 SUBCHORIONIC HEMATOMA IN FIRST TRIMESTER, SINGLE OR UNSPECIFIED FETUS: ICD-10-CM

## 2024-03-18 DIAGNOSIS — R79.89 LOW SERUM PROGESTERONE: ICD-10-CM

## 2024-03-18 RX ORDER — ONDANSETRON 4 MG/1
TABLET, ORALLY DISINTEGRATING ORAL
Qty: 15 TABLET | Refills: 1 | Status: SHIPPED | OUTPATIENT
Start: 2024-03-18

## 2024-03-19 LAB
ABO + RH BLD: NORMAL
AMPHETAMINES UR QL SCN>1000 NG/ML: NORMAL
BARBITURATES UR QL SCN>200 NG/ML: NORMAL
BASOPHILS # BLD: 0 K/UL (ref 0–0.2)
BASOPHILS NFR BLD: 0.2 %
BENZODIAZ UR QL SCN>200 NG/ML: NORMAL
BLD GP AB SCN SERPL QL: NORMAL
BUPRENORPHINE+NOR UR QL SCN: NORMAL
CANNABINOIDS UR QL SCN>50 NG/ML: NORMAL
COCAINE UR QL SCN: NORMAL
DEPRECATED RDW RBC AUTO: 14.4 % (ref 12.4–15.4)
DRUG SCREEN COMMENT UR-IMP: NORMAL
EOSINOPHIL # BLD: 0.1 K/UL (ref 0–0.6)
EOSINOPHIL NFR BLD: 0.5 %
FENTANYL SCREEN, URINE: NORMAL
HBV SURFACE AG SERPL QL IA: NORMAL
HCT VFR BLD AUTO: 40.4 % (ref 36–48)
HCV AB S/CO SERPL IA: 0.04 IV
HCV AB SERPL QL IA: NEGATIVE
HGB BLD-MCNC: 14 G/DL (ref 12–16)
HIV 1+2 AB+HIV1 P24 AG SERPL QL IA: NORMAL
HIV 2 AB SERPL QL IA: NORMAL
HIV1 AB SERPL QL IA: NORMAL
HIV1 P24 AG SERPL QL IA: NORMAL
LYMPHOCYTES # BLD: 1.7 K/UL (ref 1–5.1)
LYMPHOCYTES NFR BLD: 16.3 %
MCH RBC QN AUTO: 31 PG (ref 26–34)
MCHC RBC AUTO-ENTMCNC: 34.7 G/DL (ref 31–36)
MCV RBC AUTO: 89.4 FL (ref 80–100)
METHADONE UR QL SCN>300 NG/ML: NORMAL
MONOCYTES # BLD: 0.5 K/UL (ref 0–1.3)
MONOCYTES NFR BLD: 4.7 %
NEUTROPHILS # BLD: 8 K/UL (ref 1.7–7.7)
NEUTROPHILS NFR BLD: 78.3 %
OPIATES UR QL SCN>300 NG/ML: NORMAL
OXYCODONE UR QL SCN: NORMAL
PCP UR QL SCN>25 NG/ML: NORMAL
PH UR STRIP: 7 [PH]
PLATELET # BLD AUTO: 286 K/UL (ref 135–450)
PMV BLD AUTO: 8.2 FL (ref 5–10.5)
RBC # BLD AUTO: 4.52 M/UL (ref 4–5.2)
REAGIN+T PALLIDUM IGG+IGM SERPL-IMP: NORMAL
RUBV IGG SERPL IA-ACNC: 472.8 IU/ML
TSH SERPL DL<=0.005 MIU/L-ACNC: 1.2 UIU/ML (ref 0.27–4.2)
VZV IGG SER QL IA: NORMAL
WBC # BLD AUTO: 10.2 K/UL (ref 4–11)

## 2024-03-30 PROBLEM — Z34.81 PRENATAL CARE, SUBSEQUENT PREGNANCY IN FIRST TRIMESTER: Status: ACTIVE | Noted: 2024-03-30

## 2024-03-30 NOTE — PROGRESS NOTES
24 y/o  female at 11 weeks 2 days gestation with EDC 10/5/24 present for initial prenatal visit.    FDLMP: 23.    Stopped breast feeding in 2024.     History is positive for small left ovarian cyst.   Patient is 2 years s/p  on 22.    Medical history is positive for prednisone use.    Treated with medication after prolonged fever and presumed virus.  Prednisone utilized after joint pain and inflammatory arthritis diagnosed.  Has been weaning from prednisone--now 5 mg daily.  Also taking hydroxychloroquine (Plaquenil) 200 mg daily and Progesterone 200 mg QHS   Has appointment with Rheumatology on Friday.  Pregnancy is complicated by recent prolonged fever/virus, prednisone use, and previous   Interested in quad screen next visit.   Denies vaginal bleeding, loss of fluid, and pelvic pain.  Absent fetal movement.   Denies headaches, vision changes, and RUQ pain.  Has noted a little nausea and constipation.   Denies fever, chills, chest pain, shortness of breath, vomiting, diarrhea, dysuria and hematuria.   Maternal Wellness Questionnaire reviewed--no concerns     Diagnosis Orders   1. Prenatal care, subsequent pregnancy in first trimester  Syphilis Antibody Cascading Reflex    Varicella Zoster Antibody, IgG    Drug Screen Multi Urine With Bup    Rubella antibody, IgG    Type and Screen    HIV Screen    CBC with Auto Differential    TSH with Reflex    Hep C AB RLFX HCV PCR-A    Hepatitis B Surface Antigen      2. History of  section, low transverse        3. Low serum progesterone        4. Subchorionic hematoma in first trimester, single or unspecified fetus          Orders Placed This Encounter   Procedures    Syphilis Antibody Cascading Reflex    Varicella Zoster Antibody, IgG    Drug Screen Multi Urine With Bup    Rubella antibody, IgG    HIV Screen    CBC with Auto Differential    TSH with Reflex    Hep C AB RLFX HCV PCR-A    Hepatitis B Surface Antigen    Type

## 2024-04-14 SDOH — ECONOMIC STABILITY: FOOD INSECURITY: WITHIN THE PAST 12 MONTHS, THE FOOD YOU BOUGHT JUST DIDN'T LAST AND YOU DIDN'T HAVE MONEY TO GET MORE.: NEVER TRUE

## 2024-04-14 SDOH — ECONOMIC STABILITY: FOOD INSECURITY: WITHIN THE PAST 12 MONTHS, YOU WORRIED THAT YOUR FOOD WOULD RUN OUT BEFORE YOU GOT MONEY TO BUY MORE.: NEVER TRUE

## 2024-04-14 SDOH — ECONOMIC STABILITY: INCOME INSECURITY: HOW HARD IS IT FOR YOU TO PAY FOR THE VERY BASICS LIKE FOOD, HOUSING, MEDICAL CARE, AND HEATING?: NOT HARD AT ALL

## 2024-04-17 ENCOUNTER — ROUTINE PRENATAL (OUTPATIENT)
Dept: OBGYN CLINIC | Age: 24
End: 2024-04-17
Payer: COMMERCIAL

## 2024-04-17 VITALS
BODY MASS INDEX: 25.66 KG/M2 | WEIGHT: 159 LBS | DIASTOLIC BLOOD PRESSURE: 63 MMHG | SYSTOLIC BLOOD PRESSURE: 110 MMHG | TEMPERATURE: 96.9 F

## 2024-04-17 DIAGNOSIS — Z98.891 HISTORY OF CESAREAN SECTION, LOW TRANSVERSE: ICD-10-CM

## 2024-04-17 DIAGNOSIS — M19.90 INFLAMMATORY ARTHRITIS: ICD-10-CM

## 2024-04-17 DIAGNOSIS — O46.8X1 SUBCHORIONIC HEMATOMA IN FIRST TRIMESTER, SINGLE OR UNSPECIFIED FETUS: ICD-10-CM

## 2024-04-17 DIAGNOSIS — O41.8X10 SUBCHORIONIC HEMATOMA IN FIRST TRIMESTER, SINGLE OR UNSPECIFIED FETUS: ICD-10-CM

## 2024-04-17 DIAGNOSIS — Z34.82 PRENATAL CARE, SUBSEQUENT PREGNANCY IN SECOND TRIMESTER: Primary | ICD-10-CM

## 2024-04-17 PROCEDURE — 0502F SUBSEQUENT PRENATAL CARE: CPT | Performed by: OBSTETRICS & GYNECOLOGY

## 2024-04-17 PROCEDURE — 36415 COLL VENOUS BLD VENIPUNCTURE: CPT | Performed by: OBSTETRICS & GYNECOLOGY

## 2024-04-19 LAB
# FETUSES US: NORMAL
AFP ADJ MOM AMN: 1
AFP SERPL-MCNC: 31 NG/ML
AGE - REPORTED: 26.5 YR
CURRENT SMOKER: NORMAL
FAMILY MEMBER DISEASES HX: NO
GA METHOD: NORMAL
GA: NORMAL WK
HCG MOM SERPL: 0.46
HCG SERPL-ACNC: NORMAL IU/L
HX OF HEREDITARY DISORDERS: NO
IDDM PATIENT QL: NO
INHIBIN A MOM SERPL: 0.63
INHIBIN A SERPL-MCNC: 97 PG/ML
INTEGRATED SCN PATIENT-IMP: NORMAL
PATHOLOGY STUDY: NORMAL
SPECIMEN DRAWN SERPL: NORMAL
U ESTRIOL MOM SERPL: 0.69
U ESTRIOL SERPL-MCNC: 0.8 NG/ML

## 2024-04-24 PROBLEM — M19.90 INFLAMMATORY ARTHRITIS: Status: ACTIVE | Noted: 2024-04-24

## 2024-04-24 NOTE — PROGRESS NOTES
24 y/o  female at 15 weeks 4 days gestation with EDC 10/5/24 present for prenatal visit.    FDLMP: 23.    Stopped breast feeding in 2024.     History is positive for small left ovarian cyst.   Patient is 2 years s/p  on 22.  Interested in TOLAC.  Medical history is positive for prednisone use.    Treated with medication after prolonged fever and presumed virus.   Has weaned off of prednisone.  Had been taking prednisone for joint pain related to inflammatory arthritis.  Continues to take hydroxychloroquine (Plaquenil) 200 mg.  Discontinued Progesterone 200 mg QHS   Next appointment with Rheumatology is 6-8 weeks postpartum.  Pregnancy is complicated by recent prolonged fever/virus, prednisone use, and previous .   Denies vaginal bleeding, loss of fluid, and pelvic pain.  Absent fetal movement.   Denies headaches, vision changes, and RUQ pain.  Nausea/vomiting continues--less than 1 x per week.  Constipation is off and on.  Denies fever, chills, chest pain, shortness of breath, vomiting, diarrhea, dysuria and hematuria.   Maternal Wellness Questionnaire reviewed--no concerns  Interested in quad screen today.    Diagnosis Orders   1. Prenatal care, subsequent pregnancy in second trimester  Maternal screen 4      2. History of  section, low transverse        3. Subchorionic hematoma in first trimester, single or unspecified fetus        4. Inflammatory arthritis          Orders Placed This Encounter   Procedures    Maternal screen 4     Follow up prn and 4 weeks for prenatal visit.

## 2024-05-10 DIAGNOSIS — F41.9 ANXIETY: ICD-10-CM

## 2024-05-10 RX ORDER — SERTRALINE HYDROCHLORIDE 25 MG/1
TABLET, FILM COATED ORAL
Qty: 90 TABLET | Refills: 1 | Status: SHIPPED | OUTPATIENT
Start: 2024-05-10

## 2024-05-10 NOTE — TELEPHONE ENCOUNTER
----- Message from Sharon You sent at 5/10/2024  9:26 AM EDT -----  Regarding: Zoloft  Contact: [phone]  Anibal Druan Day! The 25mg dose is going well (dropped from 50 last month). I am almost out of my previous 25mg prescription from last fall. Can you send in a new one? Only the 50 is available at the pharmacy.     Thanks!  Sharon

## 2024-05-16 ENCOUNTER — TELEPHONE (OUTPATIENT)
Dept: FAMILY MEDICINE CLINIC | Age: 24
End: 2024-05-16

## 2024-05-16 ENCOUNTER — OFFICE VISIT (OUTPATIENT)
Dept: FAMILY MEDICINE CLINIC | Age: 24
End: 2024-05-16
Payer: COMMERCIAL

## 2024-05-16 VITALS
OXYGEN SATURATION: 97 % | TEMPERATURE: 99.1 F | HEART RATE: 106 BPM | WEIGHT: 165.8 LBS | DIASTOLIC BLOOD PRESSURE: 68 MMHG | SYSTOLIC BLOOD PRESSURE: 116 MMHG | RESPIRATION RATE: 18 BRPM | HEIGHT: 66 IN | BODY MASS INDEX: 26.65 KG/M2

## 2024-05-16 DIAGNOSIS — J06.9 URI WITH COUGH AND CONGESTION: Primary | ICD-10-CM

## 2024-05-16 LAB
Lab: NORMAL
PERFORMING INSTRUMENT: NORMAL
QC PASS/FAIL: NORMAL
SARS-COV-2, POC: NORMAL

## 2024-05-16 PROCEDURE — 99213 OFFICE O/P EST LOW 20 MIN: CPT | Performed by: NURSE PRACTITIONER

## 2024-05-16 PROCEDURE — 87426 SARSCOV CORONAVIRUS AG IA: CPT | Performed by: NURSE PRACTITIONER

## 2024-05-16 ASSESSMENT — ENCOUNTER SYMPTOMS
ABDOMINAL PAIN: 0
RHINORRHEA: 1
SORE THROAT: 1

## 2024-05-16 NOTE — TELEPHONE ENCOUNTER
Patient is calling in stating she woke up not feeling well    She has body aches, sore throat, and is feeling extra tired. She is 19 weeks pregnant    She wants to know what she should do and if she should come in?    I offered her an appointment but she wasn't sure if she needed to come in     Please Advise

## 2024-05-16 NOTE — PROGRESS NOTES
2024    This is a 24 y.o. female   Chief Complaint   Patient presents with    Cough     Started yesterday. Cough.  Body aches.  Sore throat.  Headache. Fatigue. Some sob.    .    HPI  Patient reports that yesterday started with cough, body aches, sore throat, headache, fatigue.   Denies fever.   Denies shortness of breath.   Taken tylenol for headache with improvement in symptoms.     Has son who is 2. He goes to . Reports that there are always illnesses going around the .     She is 19 weeks pregnant.      Patient Active Problem List   Diagnosis    Family history of ulcerative colitis - father    Anxiety    Other acne    History of  section, low transverse    Low testosterone level in female    Low serum progesterone    Atypical nevus of abdominal wall - 4x4mm, monitor    Subchorionic hematoma in first trimester    Prenatal care, subsequent pregnancy in second trimester    Inflammatory arthritis       Current Outpatient Medications   Medication Sig Dispense Refill    sertraline (ZOLOFT) 25 MG tablet Take one tab by mouth daily 90 tablet 1    ondansetron (ZOFRAN-ODT) 4 MG disintegrating tablet DISSOLVE ONE TABLET BY MOUTH EVERY 8 HOURS AS NEEDED FOR NAUSEA OR VOMITING 15 tablet 1    hydroxychloroquine (PLAQUENIL) 200 MG tablet Take by mouth daily      clindamycin (CLINDAGEL) 1 % gel Apply topically 2 times daily Apply topically 2 times daily. 2 each 5     No current facility-administered medications for this visit.       Allergies   Allergen Reactions    Seasonal Other (See Comments)     Sneezing, runny nose, cough       Review of Systems   Constitutional:  Positive for fatigue. Negative for activity change and fever.   HENT:  Positive for congestion, postnasal drip, rhinorrhea and sore throat.    Respiratory:  Positive for cough. Negative for shortness of breath.    Cardiovascular:  Negative for chest pain.   Gastrointestinal:  Negative for abdominal pain.   Neurological:  Positive for

## 2024-05-17 ENCOUNTER — PATIENT MESSAGE (OUTPATIENT)
Dept: FAMILY MEDICINE CLINIC | Age: 24
End: 2024-05-17

## 2024-05-24 ENCOUNTER — ROUTINE PRENATAL (OUTPATIENT)
Dept: OBGYN CLINIC | Age: 24
End: 2024-05-24

## 2024-05-24 VITALS
TEMPERATURE: 98.6 F | HEART RATE: 83 BPM | SYSTOLIC BLOOD PRESSURE: 124 MMHG | DIASTOLIC BLOOD PRESSURE: 65 MMHG | BODY MASS INDEX: 26.56 KG/M2 | WEIGHT: 164.5 LBS

## 2024-05-24 DIAGNOSIS — O41.8X10 SUBCHORIONIC HEMATOMA IN FIRST TRIMESTER, SINGLE OR UNSPECIFIED FETUS: ICD-10-CM

## 2024-05-24 DIAGNOSIS — O26.849 UTERINE SIZE DATE DISCREPANCY PREGNANCY: ICD-10-CM

## 2024-05-24 DIAGNOSIS — O26.843 UTERINE SIZE DATE DISCREPANCY PREGNANCY, THIRD TRIMESTER: ICD-10-CM

## 2024-05-24 DIAGNOSIS — O98.512 COVID-19 AFFECTING PREGNANCY IN SECOND TRIMESTER: ICD-10-CM

## 2024-05-24 DIAGNOSIS — U07.1 COVID-19 AFFECTING PREGNANCY IN SECOND TRIMESTER: ICD-10-CM

## 2024-05-24 DIAGNOSIS — O46.8X1 SUBCHORIONIC HEMATOMA IN FIRST TRIMESTER, SINGLE OR UNSPECIFIED FETUS: ICD-10-CM

## 2024-05-24 DIAGNOSIS — Z34.82 PRENATAL CARE, SUBSEQUENT PREGNANCY IN SECOND TRIMESTER: Primary | ICD-10-CM

## 2024-05-24 DIAGNOSIS — M19.90 INFLAMMATORY ARTHRITIS: ICD-10-CM

## 2024-05-24 DIAGNOSIS — Z98.891 HISTORY OF CESAREAN SECTION, LOW TRANSVERSE: ICD-10-CM

## 2024-05-24 PROCEDURE — 0502F SUBSEQUENT PRENATAL CARE: CPT | Performed by: OBSTETRICS & GYNECOLOGY

## 2024-05-24 NOTE — PROGRESS NOTES
22 y/o  female at 20 weeks 6 days gestation with EDC 10/5/24 present for prenatal visit and ultrasound.    FDLMP: 23.    Stopped breast feeding in 2024.     History is positive for small left ovarian cyst.   Patient is 2 years s/p  on 22.  Interested in TOLAC.  Medical history is positive for prednisone use.    Treated with medication after prolonged fever and presumed virus in first trimester.  Weaned off of prednisone.  Had been taking prednisone for joint pain related to inflammatory arthritis.  Continues to take hydroxychloroquine (Plaquenil) 200 mg.  Discontinued Progesterone 200 mg QHS   Since last visit treated with Paxlovid for COVID-19 infection.   Next appointment with Rheumatology is 6-8 weeks postpartum.  Pregnancy is complicated by size dates discrepancy, prolonged fever/virus late 1st, early 2nd trimester, Covid-19, prednisone use, and previous .   Denies vaginal bleeding, loss of fluid, and pelvic pain.  Positive fetal movement.   Denies headaches, vision changes, and RUQ pain.  Nausea/vomiting continues--continues to improve.   Denies fever, chills, chest pain, shortness of breath, vomiting, diarrhea, constipation, dysuria and hematuria.   Maternal Wellness Questionnaire reviewed--no concerns  Quad screen--normal.        OBSTETRIC ULTRASOUND -- SECOND TRIMESTER     DATE:  2024     PHYSICIAN: LAURA Bunn D.O.     SONOGRAPHER: LAURA Domingo [facility]     INDICATION:  Second trimester, Anatomy screening     TYPE OF SCAN: vaginal, abdominal 3.5 MHz 5MHz     FINDINGS:       A single viable intrauterine pregnancy is noted in Breech presentation. Cardiac and somatic activity are noted.     The following values were obtained:              Fetal heart rate                                               130bpm              BPD                                                                 5.14cm                        77.0 %              Head Circumference

## 2024-05-29 ENCOUNTER — TELEPHONE (OUTPATIENT)
Dept: OBGYN CLINIC | Age: 24
End: 2024-05-29

## 2024-05-29 DIAGNOSIS — Z32.01 POSITIVE PREGNANCY TEST: ICD-10-CM

## 2024-05-29 LAB — PROGEST SERPL-MCNC: 29.96 NG/ML

## 2024-05-29 NOTE — TELEPHONE ENCOUNTER
Pt seen last Friday. She says she discussed she had been having headaches and nausea in the evening. She says it was contributed to being sick with covid. She says however the symptoms are still present . She has not checked her B/P but says it was fine in office. Denies any visual disturbances. Without medication H/A is 7/10 and sometimes does not go away with medication. She has been treating symptomatically. Please advise

## 2024-05-30 NOTE — TELEPHONE ENCOUNTER
Please make sure patient is hydrating well with water.  She may want to trial a decongestant.  She may also trial excedrin migraine.  If no improvement we could send in a trial of fioricet.

## 2024-05-31 ENCOUNTER — OFFICE VISIT (OUTPATIENT)
Dept: FAMILY MEDICINE CLINIC | Age: 24
End: 2024-05-31
Payer: COMMERCIAL

## 2024-05-31 VITALS
HEART RATE: 83 BPM | OXYGEN SATURATION: 98 % | WEIGHT: 166 LBS | RESPIRATION RATE: 16 BRPM | BODY MASS INDEX: 26.68 KG/M2 | HEIGHT: 66 IN

## 2024-05-31 DIAGNOSIS — B96.89 ACUTE BACTERIAL SINUSITIS: Primary | ICD-10-CM

## 2024-05-31 DIAGNOSIS — J01.90 ACUTE BACTERIAL SINUSITIS: Primary | ICD-10-CM

## 2024-05-31 PROCEDURE — 99213 OFFICE O/P EST LOW 20 MIN: CPT | Performed by: FAMILY MEDICINE

## 2024-05-31 RX ORDER — AMOXICILLIN 875 MG/1
875 TABLET, COATED ORAL 2 TIMES DAILY
Qty: 14 TABLET | Refills: 0 | Status: SHIPPED | OUTPATIENT
Start: 2024-05-31 | End: 2024-06-07

## 2024-05-31 NOTE — PROGRESS NOTES
Sharon You (:  2000) is a 24 y.o. female,Established patient, here for evaluation of the following chief complaint(s):  Sinus Problem (Since pt had covid she has had pretty consistent headaches and moving into her sinuses and getting yellow snot )      Assessment & Plan   1. Acute bacterial sinusitis  -     amoxicillin (AMOXIL) 875 MG tablet; Take 1 tablet by mouth 2 times daily for 7 days, Disp-14 tablet, R-0Normal    Secondary bacterial infection after recent COVID viral infection.  Start amoxicillin.  Okay to take plain Mucinex during pregnancy.  Call if symptoms worsen or fail to improve    Subjective   HPI    Here for concerns for sinus pain and pressure.    She was diagnosed with COVID a couple of weeks ago.  She took Paxlovid.  She is currently pregnant in the second trimester.    She now has over the past few days developed worsening sinus symptoms with congestion, pressure, and green nasal discharge.    Review of Systems       Objective   Physical Exam  Constitutional:       Appearance: Normal appearance.   HENT:      Left Ear: Tympanic membrane normal.      Ears:      Comments: Right TM with effusion     Nose: Congestion present.   Cardiovascular:      Rate and Rhythm: Normal rate and regular rhythm.   Pulmonary:      Effort: Pulmonary effort is normal.      Breath sounds: Normal breath sounds.   Neurological:      Mental Status: She is alert.                  An electronic signature was used to authenticate this note.    --Jeb Lofton MD

## 2024-06-04 RX ORDER — ONDANSETRON 4 MG/1
TABLET, ORALLY DISINTEGRATING ORAL
Qty: 15 TABLET | Refills: 1 | Status: SHIPPED | OUTPATIENT
Start: 2024-06-04

## 2024-06-05 RX ORDER — CLINDAMYCIN PHOSPHATE 10 MG/G
GEL TOPICAL 2 TIMES DAILY
Qty: 2 EACH | Refills: 5 | Status: SHIPPED | OUTPATIENT
Start: 2024-06-05

## 2024-06-05 NOTE — TELEPHONE ENCOUNTER
----- Message from Sharon You sent at 6/5/2024 11:42 AM EDT -----  Regarding: Clindamycin Refill - Acne  Contact: [phone]  Anibal Lofton,     I don’t appear to have any refills via my pharmacy for clindamycin for acne - I think it’s been a while since I filled it! But with this pregnancy I’ve been using it more as new acne has come along with baby. Could I get this refilled?    Thanks,  Sharon

## 2024-06-21 ENCOUNTER — ROUTINE PRENATAL (OUTPATIENT)
Dept: OBGYN CLINIC | Age: 24
End: 2024-06-21

## 2024-06-21 VITALS
BODY MASS INDEX: 27.41 KG/M2 | TEMPERATURE: 98 F | DIASTOLIC BLOOD PRESSURE: 62 MMHG | SYSTOLIC BLOOD PRESSURE: 124 MMHG | WEIGHT: 169.8 LBS | HEART RATE: 89 BPM

## 2024-06-21 DIAGNOSIS — O26.849 UTERINE SIZE DATE DISCREPANCY PREGNANCY: ICD-10-CM

## 2024-06-21 DIAGNOSIS — U07.1 COVID-19 AFFECTING PREGNANCY IN SECOND TRIMESTER: ICD-10-CM

## 2024-06-21 DIAGNOSIS — O46.8X1 SUBCHORIONIC HEMATOMA IN FIRST TRIMESTER, SINGLE OR UNSPECIFIED FETUS: ICD-10-CM

## 2024-06-21 DIAGNOSIS — Z34.82 PRENATAL CARE, SUBSEQUENT PREGNANCY IN SECOND TRIMESTER: Primary | ICD-10-CM

## 2024-06-21 DIAGNOSIS — M19.90 INFLAMMATORY ARTHRITIS: ICD-10-CM

## 2024-06-21 DIAGNOSIS — Z98.891 HISTORY OF CESAREAN SECTION, LOW TRANSVERSE: ICD-10-CM

## 2024-06-21 DIAGNOSIS — O41.8X10 SUBCHORIONIC HEMATOMA IN FIRST TRIMESTER, SINGLE OR UNSPECIFIED FETUS: ICD-10-CM

## 2024-06-21 DIAGNOSIS — O98.512 COVID-19 AFFECTING PREGNANCY IN SECOND TRIMESTER: ICD-10-CM

## 2024-06-22 NOTE — PROGRESS NOTES
22 y/o  female at 24 weeks 6 days gestation with EDC 10/5/24 present for prenatal visit.    FDLMP: 23.    Stopped breast feeding in 2024.     History is positive for small left ovarian cyst.   Patient is 2 years s/p  on 22.  Interested in TOLAC.  Medical history is positive for prednisone use.    Treated with medication after prolonged fever and presumed virus in first trimester.  Weaned off of prednisone.  Had been taking prednisone for joint pain related to inflammatory arthritis.  Continues to take hydroxychloroquine (Plaquenil) 200 mg.  Discontinued Progesterone 200 mg QHS   Since last visit treated with Paxlovid for COVID-19 infection.   Next appointment with Rheumatology is 6-8 weeks postpartum.  Pregnancy is complicated by size dates discrepancy (growth scan planned for 28 weeks), prolonged fever/virus late 1st, early 2nd trimester, Covid-19, prednisone use, and previous  (desires TOLAC)   Denies vaginal bleeding, loss of fluid, and pelvic pain.  Positive fetal movement.   Admits to headaches since last visit--attributed to sinus infection--treated with amoxicillin by primary care.   Denies vision changes, and RUQ pain.  Nausea/vomiting continues--continues to improve--only seems to happen just prior to prenatal visits.   Denies fever, chills, chest pain, shortness of breath, vomiting, diarrhea, constipation, dysuria and hematuria.   Maternal Wellness Questionnaire reviewed--no concerns  Quad screen--normal.     Diagnosis Orders   1. Prenatal care, subsequent pregnancy in second trimester        2. Subchorionic hematoma in first trimester, single or unspecified fetus        3. COVID-19 affecting pregnancy in second trimester        4. History of  section, low transverse        5. Inflammatory arthritis        6. Uterine size date discrepancy pregnancy          Growth scan planned at 28 weeks  Follow up prn and 4 weeks for prenatal visit, 1 hour GTT, CBC,

## 2024-06-26 ENCOUNTER — OFFICE VISIT (OUTPATIENT)
Dept: FAMILY MEDICINE CLINIC | Age: 24
End: 2024-06-26
Payer: COMMERCIAL

## 2024-06-26 VITALS
WEIGHT: 170.2 LBS | OXYGEN SATURATION: 98 % | BODY MASS INDEX: 28.36 KG/M2 | HEART RATE: 84 BPM | HEIGHT: 65 IN | TEMPERATURE: 97.7 F

## 2024-06-26 DIAGNOSIS — B96.89 ACUTE BACTERIAL SINUSITIS: Primary | ICD-10-CM

## 2024-06-26 DIAGNOSIS — J01.90 ACUTE BACTERIAL SINUSITIS: Primary | ICD-10-CM

## 2024-06-26 DIAGNOSIS — H66.92 LEFT OTITIS MEDIA, UNSPECIFIED OTITIS MEDIA TYPE: ICD-10-CM

## 2024-06-26 PROCEDURE — 99213 OFFICE O/P EST LOW 20 MIN: CPT | Performed by: FAMILY MEDICINE

## 2024-06-26 RX ORDER — AMOXICILLIN 875 MG/1
875 TABLET, COATED ORAL 2 TIMES DAILY
Qty: 14 TABLET | Refills: 0 | Status: SHIPPED | OUTPATIENT
Start: 2024-06-26 | End: 2024-07-03

## 2024-06-26 NOTE — PROGRESS NOTES
6/26/2024    This is a 24 y.o. female   Chief Complaint   Patient presents with    Sinus Problem     Pt has been experiencing  mucus drainage, congestion, and a light cough x1 week.      HPI  Here for concerns for not feeling well.  Congestion, drainage, sinus pressure.  No on day 8-9 of symptoms    No fever or chills.    She is currently pregnant.    She had a sinus infection was treated about a month ago with amoxicillin that she feels fully resolved, symptoms began separately.      Review of Systems     Current Outpatient Medications   Medication Sig Dispense Refill    amoxicillin (AMOXIL) 875 MG tablet Take 1 tablet by mouth 2 times daily for 7 days 14 tablet 0    ondansetron (ZOFRAN-ODT) 4 MG disintegrating tablet DISSOLVE 1 TABLET BY MOUTH EVERY 8 HOURS AS NEEDED FOR NAUSEA AND/OR VOMITING 15 tablet 1    sertraline (ZOLOFT) 25 MG tablet Take one tab by mouth daily 90 tablet 1    hydroxychloroquine (PLAQUENIL) 200 MG tablet Take by mouth daily      clindamycin 1 % gel Apply topically 2 times daily Apply topically 2 times daily. (Patient not taking: Reported on 6/26/2024) 2 each 5    clotrimazole (LOTRIMIN) 2 % CREA vaginal cream Place vaginally daily for 5 days (Patient not taking: Reported on 6/26/2024) 1 each 0     No current facility-administered medications for this visit.       Pulse 84   Temp 97.7 °F (36.5 °C) (Oral)   Ht 1.651 m (5' 5\")   Wt 77.2 kg (170 lb 3.2 oz)   LMP 12/30/2023 (Exact Date)   SpO2 98%   BMI 28.32 kg/m²     Physical Exam  Constitutional:       General: She is not in acute distress.  HENT:      Head: Normocephalic and atraumatic.      Right Ear: Tympanic membrane normal.      Ears:      Comments: Left tympanic membrane with erythema and swelling     Nose: Congestion and rhinorrhea present.      Mouth/Throat:      Mouth: Mucous membranes are moist.      Pharynx: Posterior oropharyngeal erythema present. No oropharyngeal exudate.   Cardiovascular:      Rate and Rhythm: Normal rate

## 2024-07-01 ENCOUNTER — TELEPHONE (OUTPATIENT)
Dept: FAMILY MEDICINE CLINIC | Age: 24
End: 2024-07-01

## 2024-07-01 ENCOUNTER — OFFICE VISIT (OUTPATIENT)
Dept: FAMILY MEDICINE CLINIC | Age: 24
End: 2024-07-01
Payer: COMMERCIAL

## 2024-07-01 VITALS
TEMPERATURE: 97.7 F | RESPIRATION RATE: 18 BRPM | OXYGEN SATURATION: 98 % | DIASTOLIC BLOOD PRESSURE: 68 MMHG | HEIGHT: 65 IN | BODY MASS INDEX: 28.49 KG/M2 | WEIGHT: 171 LBS | HEART RATE: 87 BPM | SYSTOLIC BLOOD PRESSURE: 108 MMHG

## 2024-07-01 DIAGNOSIS — J01.90 ACUTE BACTERIAL SINUSITIS: Primary | ICD-10-CM

## 2024-07-01 DIAGNOSIS — B96.89 ACUTE BACTERIAL SINUSITIS: Primary | ICD-10-CM

## 2024-07-01 PROCEDURE — 99213 OFFICE O/P EST LOW 20 MIN: CPT | Performed by: NURSE PRACTITIONER

## 2024-07-01 RX ORDER — AMOXICILLIN AND CLAVULANATE POTASSIUM 875; 125 MG/1; MG/1
1 TABLET, FILM COATED ORAL 2 TIMES DAILY
Qty: 20 TABLET | Refills: 0 | Status: SHIPPED | OUTPATIENT
Start: 2024-07-01 | End: 2024-07-11

## 2024-07-01 ASSESSMENT — ENCOUNTER SYMPTOMS
RHINORRHEA: 1
ABDOMINAL PAIN: 0
SHORTNESS OF BREATH: 0
SORE THROAT: 1
COUGH: 0

## 2024-07-01 NOTE — TELEPHONE ENCOUNTER
Spoke to the patient she has tried the sinus rinses and the flonase spray. Patient wanted to be seen again. She is scheduled with you for tomorrow at 3:20

## 2024-07-01 NOTE — TELEPHONE ENCOUNTER
Pt was seen last week for a sinus infection and was put on amoxicillin   She has 3 pills left- for left for today and 2 for tomorrow  Her ears are now clogged- her ears will go back and forth with which one is clogged  Congestion and sore throat from mucus drainage  She is taking tylonol for the ear pain  Is there anything else she can take? Does she need to come back in for another appt?    Please advise     Greg on dianne anne

## 2024-07-01 NOTE — TELEPHONE ENCOUNTER
Has she been using a sinus rinse twice daily and Flonase 2 sprays each nostril daily?  If she has not tried those therapies, she can try that first.  If symptoms do not improve, she should return for reevaluation.

## 2024-07-02 ENCOUNTER — TELEPHONE (OUTPATIENT)
Dept: OBGYN CLINIC | Age: 24
End: 2024-07-02

## 2024-07-02 NOTE — TELEPHONE ENCOUNTER
26w3d. Pt calling because she had some vaginal spotting this am. She says she did have recent intercourse and is aware that this can happen. Pt advised to place a panty liner and monitor for continued or increased bleeding. Please advise

## 2024-07-03 NOTE — TELEPHONE ENCOUNTER
Called patient to check on bleeding. Patient states that she is no longer bleeding, she only has bleeding once yesterday and it has since resolved.     She denies any pain, cramping, leaking of vaginal fluids, vaginal discharge.     She is aware to call the office with any changes.     Routing to MD for update.

## 2024-07-19 ENCOUNTER — ROUTINE PRENATAL (OUTPATIENT)
Dept: OBGYN CLINIC | Age: 24
End: 2024-07-19

## 2024-07-19 VITALS
SYSTOLIC BLOOD PRESSURE: 106 MMHG | WEIGHT: 176.2 LBS | HEART RATE: 94 BPM | DIASTOLIC BLOOD PRESSURE: 64 MMHG | BODY MASS INDEX: 29.32 KG/M2

## 2024-07-19 DIAGNOSIS — M19.90 INFLAMMATORY ARTHRITIS: ICD-10-CM

## 2024-07-19 DIAGNOSIS — O98.512 COVID-19 AFFECTING PREGNANCY IN SECOND TRIMESTER: ICD-10-CM

## 2024-07-19 DIAGNOSIS — U07.1 COVID-19 AFFECTING PREGNANCY IN SECOND TRIMESTER: ICD-10-CM

## 2024-07-19 DIAGNOSIS — Z34.83 PRENATAL CARE, SUBSEQUENT PREGNANCY IN THIRD TRIMESTER: Primary | ICD-10-CM

## 2024-07-19 DIAGNOSIS — O26.849 UTERINE SIZE DATE DISCREPANCY PREGNANCY: ICD-10-CM

## 2024-07-19 DIAGNOSIS — Z98.891 HISTORY OF CESAREAN SECTION, LOW TRANSVERSE: ICD-10-CM

## 2024-07-19 DIAGNOSIS — O41.8X10 SUBCHORIONIC HEMATOMA IN FIRST TRIMESTER, SINGLE OR UNSPECIFIED FETUS: ICD-10-CM

## 2024-07-19 DIAGNOSIS — O40.3XX0 POLYHYDRAMNIOS IN THIRD TRIMESTER COMPLICATION, SINGLE OR UNSPECIFIED FETUS: ICD-10-CM

## 2024-07-19 DIAGNOSIS — O46.8X1 SUBCHORIONIC HEMATOMA IN FIRST TRIMESTER, SINGLE OR UNSPECIFIED FETUS: ICD-10-CM

## 2024-07-20 LAB
BASOPHILS # BLD: 0 K/UL (ref 0–0.2)
BASOPHILS NFR BLD: 0.4 %
DEPRECATED RDW RBC AUTO: 13.5 % (ref 12.4–15.4)
EOSINOPHIL # BLD: 0.1 K/UL (ref 0–0.6)
EOSINOPHIL NFR BLD: 0.8 %
GLUCOSE 1H P 50 G GLC PO SERPL-MCNC: 65 MG/DL
HCT VFR BLD AUTO: 36 % (ref 36–48)
HGB BLD-MCNC: 12 G/DL (ref 12–16)
LYMPHOCYTES # BLD: 2 K/UL (ref 1–5.1)
LYMPHOCYTES NFR BLD: 24.1 %
MCH RBC QN AUTO: 32.3 PG (ref 26–34)
MCHC RBC AUTO-ENTMCNC: 33.3 G/DL (ref 31–36)
MCV RBC AUTO: 96.9 FL (ref 80–100)
MONOCYTES # BLD: 0.5 K/UL (ref 0–1.3)
MONOCYTES NFR BLD: 6.4 %
NEUTROPHILS # BLD: 5.7 K/UL (ref 1.7–7.7)
NEUTROPHILS NFR BLD: 68.3 %
PLATELET # BLD AUTO: 261 K/UL (ref 135–450)
PMV BLD AUTO: 8.8 FL (ref 5–10.5)
RBC # BLD AUTO: 3.72 M/UL (ref 4–5.2)
WBC # BLD AUTO: 8.3 K/UL (ref 4–11)

## 2024-07-22 NOTE — PROGRESS NOTES
1:54 PM Given Tdap (Adacel) vaccine 0.5mL IM  Site:Left deltoid.   Lot # [id number]  Expiration Date:  8/17/2026  NDC # 16172-605-01 .  Patient tolerated well. No reaction noted after 20 minutes. VIS sheet provided.  Administered by: Chloe Koeppe, LPN    
                             135bpm              BPD                                                                 7.95cm                        98.7 %              Head Circumference                                       29.20cm                      96.3 %               Abdominal Circumference                              26.58cm                      90.2 %              Femur Length                                                  5.66cm                        60.4 %              Amniotic fluid index                                         26.28cm              EFW                                                                1609g              92.2 percentile     Amniotic fluid volume is normal. Based on sonographic criteria the estimated fetal age is 31weeks and 1days with EDC of 24. There is a 16 day discordance with the established EDC of 10/5/24.      The patient has an anterior placenta that is adequate distance in relation to the internal cervical os. The evaluation of the lower uterine segment and cervix reveals normal appearing anatomy.  The uterus is unremarkable/gravid.  Maternal ovaries and adnexae are not well visualized due to the size of the uterus and patient's gravid state.     Anatomy seen includes: heart, stomach, kidneys, bladder     IMPRESSION:  Single live IUP in the third trimester. Adequate interval fetal growth; however, the BPD and HC are measuring above the 95th percentile.  New finding of polyhydramnios on today's ultrasound.      Imaging is limited secondary to fetal position.  The patient is well aware of the limitations of ultrasound in the detection of anomalies.        Diagnosis Orders   1. Prenatal care, subsequent pregnancy in third trimester  CBC with Auto Differential    Glucose Challenge Gestational      2. Subchorionic hematoma in first trimester, single or unspecified fetus        3. Uterine size date discrepancy pregnancy        4. History of  section, low

## 2024-07-31 ENCOUNTER — ROUTINE PRENATAL (OUTPATIENT)
Dept: OBGYN CLINIC | Age: 24
End: 2024-07-31

## 2024-07-31 VITALS
WEIGHT: 176 LBS | SYSTOLIC BLOOD PRESSURE: 113 MMHG | DIASTOLIC BLOOD PRESSURE: 68 MMHG | BODY MASS INDEX: 29.29 KG/M2 | HEART RATE: 90 BPM

## 2024-07-31 DIAGNOSIS — O41.8X10 SUBCHORIONIC HEMATOMA IN FIRST TRIMESTER, SINGLE OR UNSPECIFIED FETUS: ICD-10-CM

## 2024-07-31 DIAGNOSIS — O40.3XX0 POLYHYDRAMNIOS IN THIRD TRIMESTER COMPLICATION, SINGLE OR UNSPECIFIED FETUS: ICD-10-CM

## 2024-07-31 DIAGNOSIS — M19.90 INFLAMMATORY ARTHRITIS: ICD-10-CM

## 2024-07-31 DIAGNOSIS — Z34.83 PRENATAL CARE, SUBSEQUENT PREGNANCY IN THIRD TRIMESTER: Primary | ICD-10-CM

## 2024-07-31 DIAGNOSIS — O26.849 UTERINE SIZE DATE DISCREPANCY PREGNANCY: ICD-10-CM

## 2024-07-31 DIAGNOSIS — O40.9XX0 POLYHYDRAMNIOS AFFECTING PREGNANCY: ICD-10-CM

## 2024-07-31 DIAGNOSIS — U07.1 COVID-19 AFFECTING PREGNANCY IN SECOND TRIMESTER: ICD-10-CM

## 2024-07-31 DIAGNOSIS — Z98.891 HISTORY OF CESAREAN SECTION, LOW TRANSVERSE: ICD-10-CM

## 2024-07-31 DIAGNOSIS — O98.512 COVID-19 AFFECTING PREGNANCY IN SECOND TRIMESTER: ICD-10-CM

## 2024-07-31 DIAGNOSIS — O46.8X1 SUBCHORIONIC HEMATOMA IN FIRST TRIMESTER, SINGLE OR UNSPECIFIED FETUS: ICD-10-CM

## 2024-07-31 PROCEDURE — 0502F SUBSEQUENT PRENATAL CARE: CPT | Performed by: OBSTETRICS & GYNECOLOGY

## 2024-07-31 RX ORDER — GLUCOSAMINE HCL/CHONDROITIN SU 500-400 MG
CAPSULE ORAL
Qty: 100 STRIP | Refills: 1 | Status: SHIPPED | OUTPATIENT
Start: 2024-07-31

## 2024-07-31 RX ORDER — LANCETS 30 GAUGE
1 EACH MISCELLANEOUS DAILY
Qty: 100 EACH | Refills: 1 | Status: SHIPPED | OUTPATIENT
Start: 2024-07-31

## 2024-07-31 RX ORDER — BLOOD-GLUCOSE METER
1 KIT MISCELLANEOUS DAILY
Qty: 1 KIT | Refills: 0 | Status: SHIPPED | OUTPATIENT
Start: 2024-07-31

## 2024-07-31 NOTE — PROGRESS NOTES
Orders   1. Prenatal care, subsequent pregnancy in third trimester        2. Polyhydramnios affecting pregnancy  glucose monitoring kit    blood glucose monitor strips    Alcohol prep pad    Lancets MISC      3. History of  section, low transverse        4. COVID-19 affecting pregnancy in second trimester        5. Inflammatory arthritis        6. Subchorionic hematoma in first trimester, single or unspecified fetus        7. Uterine size date discrepancy pregnancy        8. Polyhydramnios in third trimester complication, single or unspecified fetus          Orders Placed This Encounter   Procedures    Alcohol prep pad     Referral to MFM--persistent polyhydramnios prior to 34 weeks gestation  Glucose monitoring--5 x daily for the next 1-2 weeks  Consider further testing/evaluation per MFM recommendations.   Follow up prn and 2 weeks for prenatal visit and ultrasound

## 2024-08-16 ENCOUNTER — ROUTINE PRENATAL (OUTPATIENT)
Dept: OBGYN CLINIC | Age: 24
End: 2024-08-16

## 2024-08-16 VITALS
HEART RATE: 78 BPM | WEIGHT: 176.8 LBS | DIASTOLIC BLOOD PRESSURE: 64 MMHG | BODY MASS INDEX: 29.42 KG/M2 | SYSTOLIC BLOOD PRESSURE: 108 MMHG

## 2024-08-16 DIAGNOSIS — Z34.83 PRENATAL CARE, SUBSEQUENT PREGNANCY IN THIRD TRIMESTER: Primary | ICD-10-CM

## 2024-08-16 DIAGNOSIS — O41.8X10 SUBCHORIONIC HEMATOMA IN FIRST TRIMESTER, SINGLE OR UNSPECIFIED FETUS: ICD-10-CM

## 2024-08-16 DIAGNOSIS — O26.849 UTERINE SIZE DATE DISCREPANCY PREGNANCY: ICD-10-CM

## 2024-08-16 DIAGNOSIS — M19.90 INFLAMMATORY ARTHRITIS: ICD-10-CM

## 2024-08-16 DIAGNOSIS — Z98.891 HISTORY OF CESAREAN SECTION, LOW TRANSVERSE: ICD-10-CM

## 2024-08-16 DIAGNOSIS — O98.512 COVID-19 AFFECTING PREGNANCY IN SECOND TRIMESTER: ICD-10-CM

## 2024-08-16 DIAGNOSIS — U07.1 COVID-19 AFFECTING PREGNANCY IN SECOND TRIMESTER: ICD-10-CM

## 2024-08-16 DIAGNOSIS — O40.3XX0 POLYHYDRAMNIOS IN THIRD TRIMESTER COMPLICATION, SINGLE OR UNSPECIFIED FETUS: ICD-10-CM

## 2024-08-16 DIAGNOSIS — O46.8X1 SUBCHORIONIC HEMATOMA IN FIRST TRIMESTER, SINGLE OR UNSPECIFIED FETUS: ICD-10-CM

## 2024-08-16 PROCEDURE — 0502F SUBSEQUENT PRENATAL CARE: CPT | Performed by: OBSTETRICS & GYNECOLOGY

## 2024-08-16 NOTE — PROGRESS NOTES
24 y/o  female at 32 weeks 6 days gestation with EDC 10/5/24 presents for prenatal visit and ultrasound    FDP: 23.    Stopped breast feeding in 2024.     History is positive for small left ovarian cyst.   Patient is 2 years s/p  on 22.  Interested in TOLAC.  Medical history is positive for prednisone use.    Treated with medication after prolonged fever and presumed virus in first trimester.  Weaned off of prednisone.  Had been taking prednisone for joint pain related to inflammatory arthritis.  Continues to take hydroxychloroquine (Plaquenil) 200 mg.  Discontinued Progesterone 200 mg QHS   Since last visit treated with Paxlovid for COVID-19 infection.   Next appointment with Rheumatology is 6-8 weeks postpartum.  Pregnancy is complicated by size dates discrepancy, prolonged fever/virus late 1st, early 2nd trimester, Covid-19, prednisone use, polyhydramnios (26 cm),and previous  (desires TOLAC)     Seen by M on 24:   Overall Assessment:  -Sharon You is a 24 y.o.  with intrauterine pregnancy at 31w5d (Estimated Date of Delivery: 10/5/24)   -Mild polyhydramnios (LES 26.7 cm)  -Her GCT was normal 65mg/dl  -Has been patterning Fasting 75-89 and 1hr PP <115 md/dL (all wnl).   -Aneuploidy screening: low risk Quad screen.   -Inflammatory arthritis was on prednisone, has transitioned to Plaquenil   -Prior LTCS  -COVID infection this pregnancy     Counseling:   Polyhydramnios  I discussed with the patient the potential etiologies and complications of polyhydramnios. The most common cause is idiopathic with no known etiology. The most common identifiable etiology is diabetes, however, patient does not appear to have diabetes. Other less likely causes include chromosomal or genetic abnormalities (low risk Quad) and/or anatomic abnormalities that inhibit normal swallowing (normal anatomy today). I discussed with her the potential for amniocentesis for definitive

## 2024-08-28 ENCOUNTER — ROUTINE PRENATAL (OUTPATIENT)
Dept: OBGYN CLINIC | Age: 24
End: 2024-08-28

## 2024-08-28 VITALS
SYSTOLIC BLOOD PRESSURE: 126 MMHG | HEART RATE: 91 BPM | WEIGHT: 178.2 LBS | BODY MASS INDEX: 29.65 KG/M2 | DIASTOLIC BLOOD PRESSURE: 70 MMHG

## 2024-08-28 DIAGNOSIS — Z98.891 HISTORY OF CESAREAN SECTION, LOW TRANSVERSE: ICD-10-CM

## 2024-08-28 DIAGNOSIS — Z34.83 PRENATAL CARE, SUBSEQUENT PREGNANCY IN THIRD TRIMESTER: Primary | ICD-10-CM

## 2024-08-28 DIAGNOSIS — O40.3XX0 POLYHYDRAMNIOS IN THIRD TRIMESTER COMPLICATION, SINGLE OR UNSPECIFIED FETUS: ICD-10-CM

## 2024-08-28 DIAGNOSIS — O26.849 UTERINE SIZE DATE DISCREPANCY PREGNANCY: ICD-10-CM

## 2024-08-28 DIAGNOSIS — O46.8X1 SUBCHORIONIC HEMATOMA IN FIRST TRIMESTER, SINGLE OR UNSPECIFIED FETUS: ICD-10-CM

## 2024-08-28 DIAGNOSIS — O41.8X10 SUBCHORIONIC HEMATOMA IN FIRST TRIMESTER, SINGLE OR UNSPECIFIED FETUS: ICD-10-CM

## 2024-08-28 DIAGNOSIS — O98.512 COVID-19 AFFECTING PREGNANCY IN SECOND TRIMESTER: ICD-10-CM

## 2024-08-28 DIAGNOSIS — U07.1 COVID-19 AFFECTING PREGNANCY IN SECOND TRIMESTER: ICD-10-CM

## 2024-08-28 PROCEDURE — 0502F SUBSEQUENT PRENATAL CARE: CPT | Performed by: OBSTETRICS & GYNECOLOGY

## 2024-08-28 NOTE — PROGRESS NOTES
22 y/o  female at 34 weeks 4 days gestation with EDC 10/5/24 presents for prenatal visit   [facility]: 23.    Stopped breast feeding in 2024.     History is positive for small left ovarian cyst.   Patient is 2 years s/p  on 22.  Interested in TOLAC.  Medical history is positive for prednisone use.    Treated with medication after prolonged fever and presumed virus in first trimester.  Weaned off of prednisone.  Had been taking prednisone for joint pain related to inflammatory arthritis.  Continues to take hydroxychloroquine (Plaquenil) 200 mg.  Discontinued Progesterone 200 mg QHS   Treated with Paxlovid for COVID-19 infection during second trimester.   Next appointment with Rheumatology is 6-8 weeks postpartum.  Pregnancy is complicated by size dates discrepancy, prolonged fever/virus late 1st, early 2nd trimester, Covid-19, prednisone use, polyhydramnios--resolved (26 cm, 22.79 cm, current 19.23 cm), and previous  (desires TOLAC)   24: EFW: 459 g 91.1%  24: EFW:  1609 g, 92.2%, LES 26.28 cm  24: LES 27.33 cm,   24: LES: 22.79 cm  24: EFW: 2686 g, 71.1%, LES 19.23 cm    -----------------------------------------------------------------------------------------------------------  Seen by M on 24:   Overall Assessment:  -Sharon You is a 24 y.o.  with intrauterine pregnancy at 31w5d (Estimated Date of Delivery: 10/5/24)   -Mild polyhydramnios (LES 26.7 cm)  -Her GCT was normal 65mg/dl  -Has been patterning Fasting 75-89 and 1hr PP <115 md/dL (all wnl).   -Aneuploidy screening: low risk Quad screen.   -Inflammatory arthritis was on prednisone, has transitioned to Plaquenil   -Prior LTCS  -COVID infection this pregnancy     Counseling:   Polyhydramnios  I discussed with the patient the potential etiologies and complications of polyhydramnios. The most common cause is idiopathic with no known etiology. The most common identifiable etiology is  08/28/2024     PHYSICIAN: LAURA Bunn D.O.      SONOGRAPHER: LAURA Domingo [facility]     INDICATION: Growth,      TYPE OF SCAN: abdominal     FINDINGS:  A single viable intrauterine pregnancy is noted in cephalic presentation. Cardiac and somatic activity are noted.     The following values were obtained:              Fetal heart rate                                               159bpm              BPD                                                                 9.01cm                        92.7 %              Head Circumference                                       33.55cm                      94.8 %               Abdominal Circumference                              30.87cm                      62.8 %              Femur Length                                                  6.86cm                        59.4 %              Amniotic fluid index                                         19.23cm              EFW                                                                2686g              71.1 percentile     Amniotic fluid volume is normal. Based on sonographic criteria the estimated fetal age is 36weeks and 2days with EDC of 09/23/2024. There is a 13 day discordance with the established EDC of 10/05/2024.      The patient has an anterior placenta that is adequate distance in relation to the internal cervical os. The evaluation of the lower uterine segment and cervix reveals normal appearing anatomy.  The uterus is unremarkable/gravid.  Maternal ovaries and adnexae are not well visualized due to the size of the uterus and patient's gravid state.     Anatomy seen includes: heart, stomach, kidneys, bladder     IMPRESSION:  Single live IUP in the third trimester. Adequate interval fetal growth.      Imaging is limited secondary to fetal position.  The patient is well aware of the limitations of ultrasound in the detection of anomalies.        Diagnosis Orders   1. Prenatal care, subsequent pregnancy in third trimester

## 2024-09-09 ENCOUNTER — OFFICE VISIT (OUTPATIENT)
Dept: FAMILY MEDICINE CLINIC | Age: 24
End: 2024-09-09
Payer: COMMERCIAL

## 2024-09-09 VITALS
HEIGHT: 65 IN | RESPIRATION RATE: 16 BRPM | WEIGHT: 181 LBS | DIASTOLIC BLOOD PRESSURE: 72 MMHG | SYSTOLIC BLOOD PRESSURE: 108 MMHG | HEART RATE: 108 BPM | BODY MASS INDEX: 30.16 KG/M2 | OXYGEN SATURATION: 94 %

## 2024-09-09 DIAGNOSIS — F41.9 ANXIETY: ICD-10-CM

## 2024-09-09 DIAGNOSIS — Z00.00 WELL ADULT HEALTH CHECK: Primary | ICD-10-CM

## 2024-09-09 DIAGNOSIS — M19.90 INFLAMMATORY ARTHRITIS: ICD-10-CM

## 2024-09-09 DIAGNOSIS — Z23 NEEDS FLU SHOT: ICD-10-CM

## 2024-09-09 PROBLEM — O41.8X10 SUBCHORIONIC HEMATOMA IN FIRST TRIMESTER: Status: RESOLVED | Noted: 2024-02-23 | Resolved: 2024-09-09

## 2024-09-09 PROBLEM — O46.8X1 SUBCHORIONIC HEMATOMA IN FIRST TRIMESTER: Status: RESOLVED | Noted: 2024-02-23 | Resolved: 2024-09-09

## 2024-09-09 PROCEDURE — 90661 CCIIV3 VAC ABX FR 0.5 ML IM: CPT | Performed by: FAMILY MEDICINE

## 2024-09-09 PROCEDURE — 99395 PREV VISIT EST AGE 18-39: CPT | Performed by: FAMILY MEDICINE

## 2024-09-09 PROCEDURE — 90471 IMMUNIZATION ADMIN: CPT | Performed by: FAMILY MEDICINE

## 2024-09-10 DIAGNOSIS — O40.3XX0 POLYHYDRAMNIOS IN THIRD TRIMESTER COMPLICATION, SINGLE OR UNSPECIFIED FETUS: Primary | ICD-10-CM

## 2024-09-12 ENCOUNTER — HOSPITAL ENCOUNTER (OUTPATIENT)
Dept: ULTRASOUND IMAGING | Age: 24
Discharge: HOME | End: 2024-09-12
Payer: COMMERCIAL

## 2024-09-12 ENCOUNTER — ROUTINE PRENATAL (OUTPATIENT)
Dept: OBGYN CLINIC | Age: 24
End: 2024-09-12

## 2024-09-12 VITALS
HEART RATE: 92 BPM | DIASTOLIC BLOOD PRESSURE: 70 MMHG | WEIGHT: 181.6 LBS | SYSTOLIC BLOOD PRESSURE: 124 MMHG | BODY MASS INDEX: 30.22 KG/M2

## 2024-09-12 DIAGNOSIS — O46.8X1 SUBCHORIONIC HEMATOMA IN FIRST TRIMESTER, SINGLE OR UNSPECIFIED FETUS: ICD-10-CM

## 2024-09-12 DIAGNOSIS — Z98.891 HISTORY OF CESAREAN SECTION, LOW TRANSVERSE: ICD-10-CM

## 2024-09-12 DIAGNOSIS — O98.512 COVID-19 AFFECTING PREGNANCY IN SECOND TRIMESTER: ICD-10-CM

## 2024-09-12 DIAGNOSIS — O40.3XX0 POLYHYDRAMNIOS IN THIRD TRIMESTER COMPLICATION, SINGLE OR UNSPECIFIED FETUS: ICD-10-CM

## 2024-09-12 DIAGNOSIS — Z34.83 PRENATAL CARE, SUBSEQUENT PREGNANCY IN THIRD TRIMESTER: Primary | ICD-10-CM

## 2024-09-12 DIAGNOSIS — O26.849 UTERINE SIZE DATE DISCREPANCY PREGNANCY: ICD-10-CM

## 2024-09-12 DIAGNOSIS — O41.8X10 SUBCHORIONIC HEMATOMA IN FIRST TRIMESTER, SINGLE OR UNSPECIFIED FETUS: ICD-10-CM

## 2024-09-12 DIAGNOSIS — U07.1 COVID-19 AFFECTING PREGNANCY IN SECOND TRIMESTER: ICD-10-CM

## 2024-09-12 PROCEDURE — 0502F SUBSEQUENT PRENATAL CARE: CPT | Performed by: OBSTETRICS & GYNECOLOGY

## 2024-09-12 PROCEDURE — 76819 FETAL BIOPHYS PROFIL W/O NST: CPT

## 2024-09-12 PROCEDURE — 76805 OB US >/= 14 WKS SNGL FETUS: CPT

## 2024-09-16 LAB — GP B STREP SPEC QL CULT: NORMAL

## 2024-09-18 ENCOUNTER — ROUTINE PRENATAL (OUTPATIENT)
Dept: OBGYN CLINIC | Age: 24
End: 2024-09-18

## 2024-09-18 VITALS
WEIGHT: 181 LBS | HEART RATE: 77 BPM | DIASTOLIC BLOOD PRESSURE: 76 MMHG | SYSTOLIC BLOOD PRESSURE: 112 MMHG | BODY MASS INDEX: 30.12 KG/M2

## 2024-09-18 DIAGNOSIS — U07.1 COVID-19 AFFECTING PREGNANCY IN SECOND TRIMESTER: ICD-10-CM

## 2024-09-18 DIAGNOSIS — Z98.891 HISTORY OF CESAREAN SECTION, LOW TRANSVERSE: ICD-10-CM

## 2024-09-18 DIAGNOSIS — O98.512 COVID-19 AFFECTING PREGNANCY IN SECOND TRIMESTER: ICD-10-CM

## 2024-09-18 DIAGNOSIS — O26.849 UTERINE SIZE DATE DISCREPANCY PREGNANCY: ICD-10-CM

## 2024-09-18 DIAGNOSIS — Z34.83 PRENATAL CARE, SUBSEQUENT PREGNANCY IN THIRD TRIMESTER: Primary | ICD-10-CM

## 2024-09-18 DIAGNOSIS — M19.90 INFLAMMATORY ARTHRITIS: ICD-10-CM

## 2024-09-18 PROCEDURE — 0502F SUBSEQUENT PRENATAL CARE: CPT | Performed by: OBSTETRICS & GYNECOLOGY

## 2024-09-25 ENCOUNTER — ROUTINE PRENATAL (OUTPATIENT)
Dept: OBGYN CLINIC | Age: 24
End: 2024-09-25

## 2024-09-25 VITALS
HEART RATE: 83 BPM | SYSTOLIC BLOOD PRESSURE: 118 MMHG | BODY MASS INDEX: 30.62 KG/M2 | DIASTOLIC BLOOD PRESSURE: 70 MMHG | WEIGHT: 184 LBS

## 2024-09-25 DIAGNOSIS — O26.849 UTERINE SIZE DATE DISCREPANCY PREGNANCY: ICD-10-CM

## 2024-09-25 DIAGNOSIS — Z98.891 HISTORY OF CESAREAN SECTION, LOW TRANSVERSE: ICD-10-CM

## 2024-09-25 DIAGNOSIS — Z34.83 PRENATAL CARE, SUBSEQUENT PREGNANCY IN THIRD TRIMESTER: ICD-10-CM

## 2024-09-25 DIAGNOSIS — O09.93 HIGH-RISK PREGNANCY SUPERVISION, THIRD TRIMESTER: Primary | ICD-10-CM

## 2024-09-25 DIAGNOSIS — O41.8X10 SUBCHORIONIC HEMATOMA IN FIRST TRIMESTER, SINGLE OR UNSPECIFIED FETUS: ICD-10-CM

## 2024-09-25 DIAGNOSIS — U07.1 COVID-19 AFFECTING PREGNANCY IN SECOND TRIMESTER: ICD-10-CM

## 2024-09-25 DIAGNOSIS — O40.3XX0 POLYHYDRAMNIOS IN THIRD TRIMESTER COMPLICATION, SINGLE OR UNSPECIFIED FETUS: ICD-10-CM

## 2024-09-25 DIAGNOSIS — O46.8X1 SUBCHORIONIC HEMATOMA IN FIRST TRIMESTER, SINGLE OR UNSPECIFIED FETUS: ICD-10-CM

## 2024-09-25 DIAGNOSIS — O98.512 COVID-19 AFFECTING PREGNANCY IN SECOND TRIMESTER: ICD-10-CM

## 2024-09-25 PROCEDURE — 0502F SUBSEQUENT PRENATAL CARE: CPT | Performed by: OBSTETRICS & GYNECOLOGY

## 2024-10-02 ENCOUNTER — ROUTINE PRENATAL (OUTPATIENT)
Dept: OBGYN CLINIC | Age: 24
End: 2024-10-02

## 2024-10-02 VITALS
DIASTOLIC BLOOD PRESSURE: 75 MMHG | HEART RATE: 92 BPM | SYSTOLIC BLOOD PRESSURE: 121 MMHG | BODY MASS INDEX: 30.85 KG/M2 | WEIGHT: 185.4 LBS

## 2024-10-02 DIAGNOSIS — O41.8X10 SUBCHORIONIC HEMATOMA IN FIRST TRIMESTER, SINGLE OR UNSPECIFIED FETUS: ICD-10-CM

## 2024-10-02 DIAGNOSIS — O26.849 UTERINE SIZE DATE DISCREPANCY PREGNANCY: ICD-10-CM

## 2024-10-02 DIAGNOSIS — O40.3XX0 POLYHYDRAMNIOS IN THIRD TRIMESTER COMPLICATION, SINGLE OR UNSPECIFIED FETUS: ICD-10-CM

## 2024-10-02 DIAGNOSIS — Z98.891 HISTORY OF CESAREAN SECTION, LOW TRANSVERSE: ICD-10-CM

## 2024-10-02 DIAGNOSIS — O09.93 HIGH-RISK PREGNANCY SUPERVISION, THIRD TRIMESTER: ICD-10-CM

## 2024-10-02 DIAGNOSIS — O46.8X1 SUBCHORIONIC HEMATOMA IN FIRST TRIMESTER, SINGLE OR UNSPECIFIED FETUS: ICD-10-CM

## 2024-10-02 DIAGNOSIS — M19.90 INFLAMMATORY ARTHRITIS: ICD-10-CM

## 2024-10-02 DIAGNOSIS — Z34.83 PRENATAL CARE, SUBSEQUENT PREGNANCY IN THIRD TRIMESTER: Primary | ICD-10-CM

## 2024-10-02 PROCEDURE — 0502F SUBSEQUENT PRENATAL CARE: CPT | Performed by: OBSTETRICS & GYNECOLOGY

## 2024-10-05 ENCOUNTER — HOSPITAL ENCOUNTER (INPATIENT)
Age: 24
LOS: 2 days | Discharge: HOME | End: 2024-10-07
Attending: OBSTETRICS & GYNECOLOGY | Admitting: OBSTETRICS & GYNECOLOGY
Payer: COMMERCIAL

## 2024-10-05 ENCOUNTER — ANESTHESIA EVENT (OUTPATIENT)
Dept: LABOR AND DELIVERY | Age: 24
End: 2024-10-05
Payer: COMMERCIAL

## 2024-10-05 ENCOUNTER — ANESTHESIA (OUTPATIENT)
Dept: LABOR AND DELIVERY | Age: 24
End: 2024-10-05
Payer: COMMERCIAL

## 2024-10-05 ENCOUNTER — TELEPHONE (OUTPATIENT)
Dept: OBGYN CLINIC | Age: 24
End: 2024-10-05

## 2024-10-05 PROBLEM — O34.219 DESIRES VBAC (VAGINAL BIRTH AFTER CESAREAN) TRIAL: Status: ACTIVE | Noted: 2024-10-05

## 2024-10-05 LAB
ABO + RH BLD: NORMAL
AMPHETAMINES UR QL SCN>1000 NG/ML: NORMAL
BARBITURATES UR QL SCN>200 NG/ML: NORMAL
BENZODIAZ UR QL SCN>200 NG/ML: NORMAL
BLD GP AB SCN SERPL QL: NORMAL
BUPRENORPHINE+NOR UR QL SCN: NORMAL
CANNABINOIDS UR QL SCN>50 NG/ML: NORMAL
COCAINE UR QL SCN: NORMAL
DEPRECATED RDW RBC AUTO: 13.5 % (ref 12.4–15.4)
DRUG SCREEN COMMENT UR-IMP: NORMAL
FENTANYL SCREEN, URINE: NORMAL
HCT VFR BLD AUTO: 40.8 % (ref 36–48)
HGB BLD-MCNC: 13.9 G/DL (ref 12–16)
MCH RBC QN AUTO: 31.6 PG (ref 26–34)
MCHC RBC AUTO-ENTMCNC: 34.2 G/DL (ref 31–36)
MCV RBC AUTO: 92.4 FL (ref 80–100)
METHADONE UR QL SCN>300 NG/ML: NORMAL
OPIATES UR QL SCN>300 NG/ML: NORMAL
OXYCODONE UR QL SCN: NORMAL
PCP UR QL SCN>25 NG/ML: NORMAL
PH UR STRIP: 6 [PH]
PLATELET # BLD AUTO: 244 K/UL (ref 135–450)
PMV BLD AUTO: 8.7 FL (ref 5–10.5)
RBC # BLD AUTO: 4.42 M/UL (ref 4–5.2)
REAGIN+T PALLIDUM IGG+IGM SERPL-IMP: NORMAL
WBC # BLD AUTO: 14.3 K/UL (ref 4–11)

## 2024-10-05 PROCEDURE — 6360000002 HC RX W HCPCS: Performed by: OBSTETRICS & GYNECOLOGY

## 2024-10-05 PROCEDURE — 1220000000 HC SEMI PRIVATE OB R&B

## 2024-10-05 PROCEDURE — 3700000025 EPIDURAL BLOCK: Performed by: ANESTHESIOLOGY

## 2024-10-05 PROCEDURE — 86780 TREPONEMA PALLIDUM: CPT

## 2024-10-05 PROCEDURE — 80307 DRUG TEST PRSMV CHEM ANLYZR: CPT

## 2024-10-05 PROCEDURE — 86850 RBC ANTIBODY SCREEN: CPT

## 2024-10-05 PROCEDURE — 2580000003 HC RX 258: Performed by: OBSTETRICS & GYNECOLOGY

## 2024-10-05 PROCEDURE — 85027 COMPLETE CBC AUTOMATED: CPT

## 2024-10-05 PROCEDURE — 2580000003 HC RX 258: Performed by: NURSE ANESTHETIST, CERTIFIED REGISTERED

## 2024-10-05 PROCEDURE — 6360000002 HC RX W HCPCS: Performed by: NURSE ANESTHETIST, CERTIFIED REGISTERED

## 2024-10-05 PROCEDURE — 86901 BLOOD TYPING SEROLOGIC RH(D): CPT

## 2024-10-05 PROCEDURE — 6370000000 HC RX 637 (ALT 250 FOR IP): Performed by: OBSTETRICS & GYNECOLOGY

## 2024-10-05 PROCEDURE — 86900 BLOOD TYPING SEROLOGIC ABO: CPT

## 2024-10-05 PROCEDURE — 2500000003 HC RX 250 WO HCPCS: Performed by: NURSE ANESTHETIST, CERTIFIED REGISTERED

## 2024-10-05 PROCEDURE — 2500000003 HC RX 250 WO HCPCS: Performed by: OBSTETRICS & GYNECOLOGY

## 2024-10-05 RX ORDER — SODIUM CHLORIDE 0.9 % (FLUSH) 0.9 %
5-40 SYRINGE (ML) INJECTION PRN
Status: DISCONTINUED | OUTPATIENT
Start: 2024-10-05 | End: 2024-10-07 | Stop reason: HOSPADM

## 2024-10-05 RX ORDER — ACETAMINOPHEN 325 MG/1
650 TABLET ORAL EVERY 4 HOURS PRN
Status: DISCONTINUED | OUTPATIENT
Start: 2024-10-05 | End: 2024-10-07 | Stop reason: HOSPADM

## 2024-10-05 RX ORDER — SODIUM CHLORIDE 9 MG/ML
25 INJECTION, SOLUTION INTRAVENOUS PRN
Status: DISCONTINUED | OUTPATIENT
Start: 2024-10-05 | End: 2024-10-07 | Stop reason: HOSPADM

## 2024-10-05 RX ORDER — LIDOCAINE HYDROCHLORIDE AND EPINEPHRINE BITARTRATE 20; .01 MG/ML; MG/ML
INJECTION, SOLUTION SUBCUTANEOUS
Status: DISCONTINUED | OUTPATIENT
Start: 2024-10-05 | End: 2024-10-06 | Stop reason: SDUPTHER

## 2024-10-05 RX ORDER — TERBUTALINE SULFATE 1 MG/ML
0.25 INJECTION, SOLUTION SUBCUTANEOUS
Status: ACTIVE | OUTPATIENT
Start: 2024-10-05 | End: 2024-10-06

## 2024-10-05 RX ORDER — MISOPROSTOL 100 UG/1
400 TABLET ORAL PRN
Status: DISCONTINUED | OUTPATIENT
Start: 2024-10-05 | End: 2024-10-07 | Stop reason: HOSPADM

## 2024-10-05 RX ORDER — NALBUPHINE HYDROCHLORIDE 10 MG/ML
10 INJECTION, SOLUTION INTRAMUSCULAR; INTRAVENOUS; SUBCUTANEOUS
Status: DISCONTINUED | OUTPATIENT
Start: 2024-10-05 | End: 2024-10-07 | Stop reason: HOSPADM

## 2024-10-05 RX ORDER — FAMOTIDINE 10 MG/ML
20 INJECTION, SOLUTION INTRAVENOUS 2 TIMES DAILY PRN
Status: DISCONTINUED | OUTPATIENT
Start: 2024-10-05 | End: 2024-10-07 | Stop reason: HOSPADM

## 2024-10-05 RX ORDER — METHYLERGONOVINE MALEATE 0.2 MG/ML
200 INJECTION INTRAVENOUS PRN
Status: DISCONTINUED | OUTPATIENT
Start: 2024-10-05 | End: 2024-10-07 | Stop reason: HOSPADM

## 2024-10-05 RX ORDER — SODIUM CHLORIDE, SODIUM LACTATE, POTASSIUM CHLORIDE, CALCIUM CHLORIDE 600; 310; 30; 20 MG/100ML; MG/100ML; MG/100ML; MG/100ML
INJECTION, SOLUTION INTRAVENOUS CONTINUOUS
Status: DISCONTINUED | OUTPATIENT
Start: 2024-10-05 | End: 2024-10-07 | Stop reason: HOSPADM

## 2024-10-05 RX ORDER — BUPIVACAINE HYDROCHLORIDE 2.5 MG/ML
INJECTION, SOLUTION EPIDURAL; INFILTRATION; INTRACAUDAL
Status: DISCONTINUED | OUTPATIENT
Start: 2024-10-05 | End: 2024-10-06 | Stop reason: SDUPTHER

## 2024-10-05 RX ORDER — SEVOFLURANE 250 ML/250ML
1 LIQUID RESPIRATORY (INHALATION) CONTINUOUS PRN
Status: DISCONTINUED | OUTPATIENT
Start: 2024-10-05 | End: 2024-10-07 | Stop reason: HOSPADM

## 2024-10-05 RX ORDER — FENTANYL CITRATE 50 UG/ML
INJECTION, SOLUTION INTRAMUSCULAR; INTRAVENOUS
Status: DISCONTINUED | OUTPATIENT
Start: 2024-10-05 | End: 2024-10-06 | Stop reason: SDUPTHER

## 2024-10-05 RX ORDER — CARBOPROST TROMETHAMINE 250 UG/ML
250 INJECTION, SOLUTION INTRAMUSCULAR PRN
Status: DISCONTINUED | OUTPATIENT
Start: 2024-10-05 | End: 2024-10-07 | Stop reason: HOSPADM

## 2024-10-05 RX ORDER — ONDANSETRON 4 MG/1
4 TABLET, ORALLY DISINTEGRATING ORAL EVERY 6 HOURS PRN
Status: DISCONTINUED | OUTPATIENT
Start: 2024-10-05 | End: 2024-10-07 | Stop reason: SDUPTHER

## 2024-10-05 RX ORDER — TRANEXAMIC ACID 10 MG/ML
1000 INJECTION, SOLUTION INTRAVENOUS
Status: ACTIVE | OUTPATIENT
Start: 2024-10-05 | End: 2024-10-06

## 2024-10-05 RX ORDER — ONDANSETRON 2 MG/ML
4 INJECTION INTRAMUSCULAR; INTRAVENOUS EVERY 6 HOURS PRN
Status: DISCONTINUED | OUTPATIENT
Start: 2024-10-05 | End: 2024-10-07 | Stop reason: SDUPTHER

## 2024-10-05 RX ORDER — SODIUM CHLORIDE 0.9 % (FLUSH) 0.9 %
5-40 SYRINGE (ML) INJECTION EVERY 12 HOURS SCHEDULED
Status: DISCONTINUED | OUTPATIENT
Start: 2024-10-05 | End: 2024-10-07 | Stop reason: HOSPADM

## 2024-10-05 RX ORDER — FENTANYL CITRATE 50 UG/ML
INJECTION, SOLUTION INTRAMUSCULAR; INTRAVENOUS
Status: COMPLETED
Start: 2024-10-05 | End: 2024-10-05

## 2024-10-05 RX ORDER — SODIUM CHLORIDE, SODIUM LACTATE, POTASSIUM CHLORIDE, AND CALCIUM CHLORIDE .6; .31; .03; .02 G/100ML; G/100ML; G/100ML; G/100ML
500 INJECTION, SOLUTION INTRAVENOUS PRN
Status: DISCONTINUED | OUTPATIENT
Start: 2024-10-05 | End: 2024-10-07 | Stop reason: HOSPADM

## 2024-10-05 RX ADMIN — BUPIVACAINE HYDROCHLORIDE 5 MG: 2.5 INJECTION, SOLUTION EPIDURAL; INFILTRATION; INTRACAUDAL; PERINEURAL at 13:47

## 2024-10-05 RX ADMIN — FENTANYL CITRATE 100 MCG: 50 INJECTION, SOLUTION INTRAMUSCULAR; INTRAVENOUS at 13:52

## 2024-10-05 RX ADMIN — Medication 12 ML/HR: at 13:54

## 2024-10-05 RX ADMIN — FAMOTIDINE 20 MG: 10 INJECTION, SOLUTION INTRAVENOUS at 22:54

## 2024-10-05 RX ADMIN — Medication 1 MILLI-UNITS/MIN: at 08:40

## 2024-10-05 RX ADMIN — ONDANSETRON 4 MG: 2 INJECTION INTRAMUSCULAR; INTRAVENOUS at 12:16

## 2024-10-05 RX ADMIN — SODIUM CHLORIDE, POTASSIUM CHLORIDE, SODIUM LACTATE AND CALCIUM CHLORIDE: 600; 310; 30; 20 INJECTION, SOLUTION INTRAVENOUS at 22:08

## 2024-10-05 RX ADMIN — BUPIVACAINE HYDROCHLORIDE 5 MG: 2.5 INJECTION, SOLUTION EPIDURAL; INFILTRATION; INTRACAUDAL; PERINEURAL at 13:52

## 2024-10-05 RX ADMIN — ACETAMINOPHEN 650 MG: 325 TABLET ORAL at 21:07

## 2024-10-05 RX ADMIN — SODIUM CHLORIDE, POTASSIUM CHLORIDE, SODIUM LACTATE AND CALCIUM CHLORIDE: 600; 310; 30; 20 INJECTION, SOLUTION INTRAVENOUS at 13:46

## 2024-10-05 RX ADMIN — DEXMEDETOMIDINE HYDROCHLORIDE 40 MCG: 100 INJECTION, SOLUTION INTRAVENOUS at 13:52

## 2024-10-05 RX ADMIN — Medication 1 MILLI-UNITS/MIN: at 13:50

## 2024-10-05 RX ADMIN — SODIUM CHLORIDE, POTASSIUM CHLORIDE, SODIUM LACTATE AND CALCIUM CHLORIDE: 600; 310; 30; 20 INJECTION, SOLUTION INTRAVENOUS at 05:15

## 2024-10-05 RX ADMIN — NALBUPHINE HYDROCHLORIDE 10 MG: 10 INJECTION, SOLUTION INTRAMUSCULAR; INTRAVENOUS; SUBCUTANEOUS at 06:49

## 2024-10-05 RX ADMIN — NALBUPHINE HYDROCHLORIDE 10 MG: 10 INJECTION, SOLUTION INTRAMUSCULAR; INTRAVENOUS; SUBCUTANEOUS at 11:05

## 2024-10-05 RX ADMIN — LIDOCAINE HYDROCHLORIDE AND EPINEPHRINE 3 ML: 20; 10 INJECTION, SOLUTION INFILTRATION; PERINEURAL at 13:45

## 2024-10-05 RX ADMIN — SODIUM CHLORIDE, POTASSIUM CHLORIDE, SODIUM LACTATE AND CALCIUM CHLORIDE: 600; 310; 30; 20 INJECTION, SOLUTION INTRAVENOUS at 09:23

## 2024-10-05 ASSESSMENT — PAIN DESCRIPTION - LOCATION: LOCATION: HEAD

## 2024-10-05 ASSESSMENT — PAIN SCALES - GENERAL: PAINLEVEL_OUTOF10: 4

## 2024-10-05 ASSESSMENT — PAIN DESCRIPTION - DESCRIPTORS: DESCRIPTORS: DULL

## 2024-10-05 NOTE — TELEPHONE ENCOUNTER
Spoke with the patient through the answering service.     Rep[place] and around noon noted contractions that were every 10 minutes.  They have since increased in intensity and frequency.  For the past hour (starting around 2300) contractions have been every 4 minutes, lasting 45 seconds.  Has noted some pink tinged discharge with wiping.  Denies VB, LOF.  +FM.  Denies chest pain, shortness of breath, fever, chills, nausea, vomiting.  Denies headache, vision changes, RUQ pain.     Will present to L&D for r/o labor.     L&D made aware.

## 2024-10-05 NOTE — PROGRESS NOTES
Brief Labor note:     24 y.o.  @ 40w0d, here for latent labor, hx of CS x 1, hopeful for .   Pt seen and examined but becoming more uncomfortable.   Denies HA / vision changes, RUQ pain or worsening edema at this time.     FHT:   140 bpm   mod variability  (+) accels, (-) decels   Contractions q irregular min     VE:    /-3     IV Pitocin: 4 mU     Plan:  Discussed options for care as she has not made much change on her own ... These included chopra bulb, continued IV pitocin, DC to Home until labor progessres   She is considering an epidural and IV pitocin vs RCS   Will continue IV pitocin for now unless she decides otherwise   Reassuring fetal status / Continuous fetal monitoring     Please call with questions or concerns   Karlie Higgins, DO

## 2024-10-05 NOTE — FLOWSHEET NOTE
10/05/24 0628   Provider Notification   Name of Team Member Notified Talia SOTO   Treatment Team Role Attending Provider   Method of Communication Call   Response See orders   Notification Time 0628     Aware of SVE, FHR strip and ctx patterns as well as pts pain level.   Orders to recheck in 4 hours.   Also received order for arely Higgins MD taking over at 0700.

## 2024-10-05 NOTE — FLOWSHEET NOTE
10/05/24 0710   Handoff   Handoff Given To Carrie RN   Handoff Received From Magen RN   Handoff Communication Face to Face;In department   Time Handoff Given 0710

## 2024-10-05 NOTE — PLAN OF CARE
Problem: Vaginal Birth or  Section  Goal: Fetal and maternal status remain reassuring during the birth process  Description:  Birth OB-Pregnancy care plan goal which identifies if the fetal and maternal status remain reassuring during the birth process  Outcome: Progressing     Problem: Safety - Adult  Goal: Free from fall injury  Outcome: Progressing

## 2024-10-05 NOTE — FLOWSHEET NOTE
10/05/24 0649 10/05/24 0650   Maternal Vitals (MEW-T)   Temp  --  98 °F (36.7 °C)   Temp Source  --  Oral   Pulse  --  90   Heart Rate Source  --  Monitor   Respirations  --  18   BP  --  133/79   BP Method  --  Automatic   Patient Position  --  Semi fowlers   SpO2  --  98 %   Pulse Oximeter Device Mode  --  Intermittent   Pulse Oximeter Device Location  --  Right;Finger   Level of Consciousness  --  Alert   MEW-T Score  --  0   Pain Assessment   Pain Assessment 0-10  --    Pain Level 7  --    Patient's Stated Pain Goal 0 - No pain  --    Pain Location Abdomen  --    Pain Descriptors   (contraction)  --    Fetal Heart Rate   Interventions   (nubain administered)  --

## 2024-10-05 NOTE — FLOWSHEET NOTE
10/05/24 0612   Cervical Exam   Dilation (cm) 1   Effacement 60   Cervical Characteristics Posterior   Station -3   Station (Labor Curve Graph) 8   OB Examiner Rack RN

## 2024-10-05 NOTE — ANESTHESIA PRE PROCEDURE
(97.8 °F)     TempSrc:  Oral     SpO2:   98% 99%   Weight:       Height:                                                  BP Readings from Last 3 Encounters:   10/05/24 135/83   10/02/24 121/75   09/25/24 118/70       NPO Status:                                                                                 BMI:   Wt Readings from Last 3 Encounters:   10/05/24 84.1 kg (185 lb 6.4 oz)   10/02/24 84.1 kg (185 lb 6.4 oz)   09/25/24 83.5 kg (184 lb)     Body mass index is 29.92 kg/m².    CBC:   Lab Results   Component Value Date/Time    WBC 14.3 10/05/2024 02:25 AM    RBC 4.42 10/05/2024 02:25 AM    HGB 13.9 10/05/2024 02:25 AM    HCT 40.8 10/05/2024 02:25 AM    MCV 92.4 10/05/2024 02:25 AM    RDW 13.5 10/05/2024 02:25 AM     10/05/2024 02:25 AM       CMP:   Lab Results   Component Value Date/Time     10/30/2023 11:17 AM    K 4.5 10/30/2023 11:17 AM    CL 99 10/30/2023 11:17 AM    CO2 27 10/30/2023 11:17 AM    BUN 5 10/30/2023 11:17 AM    CREATININE <0.5 10/30/2023 11:17 AM    GFRAA >60 07/21/2022 07:07 AM    AGRATIO 1.2 10/30/2023 11:17 AM    LABGLOM >60 10/30/2023 11:17 AM    GLUCOSE 85 10/30/2023 11:17 AM    CALCIUM 9.1 10/30/2023 11:17 AM    BILITOT <0.2 11/02/2023 10:56 AM    ALKPHOS 193 11/02/2023 10:56 AM    AST 20 11/02/2023 10:56 AM    ALT 44 11/02/2023 10:56 AM       POC Tests: No results for input(s): \"POCGLU\", \"POCNA\", \"POCK\", \"POCCL\", \"POCBUN\", \"POCHEMO\", \"POCHCT\" in the last 72 hours.    Coags: No results found for: \"PROTIME\", \"INR\", \"APTT\"    HCG (If Applicable):   Lab Results   Component Value Date    PREGSERUM Negative 10/22/2023    HCGQUANT 16094.0 02/09/2024        ABGs: No results found for: \"PHART\", \"PO2ART\", \"FRB2QDP\", \"IYF9XED\", \"BEART\", \"G9FUCSWA\"     Type & Screen (If Applicable):  Lab Results   Component Value Date    ABORH A POS 10/05/2024    LABANTI NEG 10/05/2024       Drug/Infectious Status (If Applicable):  Lab Results   Component Value Date/Time    HIV Non-Reactive

## 2024-10-05 NOTE — FLOWSHEET NOTE
Report received from Danny AMBROCIO.    10/05/24 0305   Fetal Heart Rate   Interventions   (Pt assisted to siting position on birthing ball, intermittently using nitrous oxide. Discussed  POC and future epidural placement. Pt verbalizes understanding and denies any further questions. FOB remains at bedside for labor support.)

## 2024-10-05 NOTE — FLOWSHEET NOTE
Dr. Melgar called by this RN and notified of patient's expected arrival to triage for complaints of contractions that started yesterday at 1530 and increased in intensity and frequency at 2230.     Pt contraction q4 minutes, moaning through contractions rating 5/10 on pain scale.  with moderate variability with accels. SVE Ft/60/-3 very posterior and firm.     Provider stated RN can offer admission or 4 hour observation to patient.

## 2024-10-05 NOTE — FLOWSHEET NOTE
Dr. Melgar notified of patient's desire to stay and be admitted.    Provider gave telephone orders for routine labor and use of nitrous. Asked RN to have labor RN to recheck patient in 4 hours and call provider back. Patient to remain on continuous EFM and toco.

## 2024-10-05 NOTE — ANESTHESIA PROCEDURE NOTES
Epidural Block    Patient location during procedure: OB  Start time: 10/5/2024 1:25 PM  End time: 10/5/2024 1:35 PM  Reason for block: labor epidural  Staffing  Performed: resident/CRNA   Resident/CRNA: Nahum Avilez APRN - CRNA  Performed by: Nahum Avilez APRN - CRNA  Authorized by: Brendon Walker MD    Epidural  Patient position: sitting  Prep: Betadine  Patient monitoring: cardiac monitor, continuous pulse ox and frequent blood pressure checks  Approach: midline  Location: L4-5  Injection technique: CARRILLO air  Provider prep: mask and sterile gloves  Needle  Needle type: Tuohy   Needle gauge: 18 G  Needle length: 3.5 in  Needle insertion depth: 6 cm  Catheter type: side hole  Catheter size: 18 G (20 G)  Catheter at skin depth: 14 cm  Test dose: negativeCatheter Secured: tegaderm and tape  Assessment  Sensory level: T10  Hemodynamics: stable  Attempts: 1  Outcomes: uncomplicated  Preanesthetic Checklist  Completed: patient identified, IV checked, site marked, risks and benefits discussed, surgical/procedural consents, equipment checked, pre-op evaluation, timeout performed, anesthesia consent given, oxygen available and monitors applied/VS acknowledged

## 2024-10-05 NOTE — FLOWSHEET NOTE
Delayed entry d/t patient care    PT presented to OB triage unit with complaints of contractions with hx of LTCS for fetal intolerance in 7/2022 . PT ambulated to T1. Oriented to room and telephone. Pt provided urine sample and was placed on EFM. Pt states she is feeling the baby move. Denies any leaking of fluid or vaginal bleeding. Pt started chris at 1530 yesterday with ctxs increasing in intensity and frequency at 1600.. Rates her pain a 5 out of 10 during a contraction. Denies any additional pain/concerns at this time.

## 2024-10-05 NOTE — PROGRESS NOTES
EFM and ctx reviewed with Dr Higgins. Requested SVE, pt declined at this time. Plan to initiate pitocin. Pt agreeable. New order placed.

## 2024-10-06 PROBLEM — O34.219 VBAC, DELIVERED: Status: ACTIVE | Noted: 2024-10-06

## 2024-10-06 PROCEDURE — 6360000002 HC RX W HCPCS

## 2024-10-06 PROCEDURE — 88307 TISSUE EXAM BY PATHOLOGIST: CPT

## 2024-10-06 PROCEDURE — 2500000003 HC RX 250 WO HCPCS: Performed by: OBSTETRICS & GYNECOLOGY

## 2024-10-06 PROCEDURE — 6370000000 HC RX 637 (ALT 250 FOR IP): Performed by: OBSTETRICS & GYNECOLOGY

## 2024-10-06 PROCEDURE — 6360000002 HC RX W HCPCS: Performed by: OBSTETRICS & GYNECOLOGY

## 2024-10-06 PROCEDURE — 51701 INSERT BLADDER CATHETER: CPT

## 2024-10-06 PROCEDURE — 1220000000 HC SEMI PRIVATE OB R&B

## 2024-10-06 PROCEDURE — 0KQM0ZZ REPAIR PERINEUM MUSCLE, OPEN APPROACH: ICD-10-PCS | Performed by: OBSTETRICS & GYNECOLOGY

## 2024-10-06 PROCEDURE — 7200000001 HC VAGINAL DELIVERY

## 2024-10-06 PROCEDURE — 2580000003 HC RX 258: Performed by: OBSTETRICS & GYNECOLOGY

## 2024-10-06 RX ORDER — ONDANSETRON 2 MG/ML
4 INJECTION INTRAMUSCULAR; INTRAVENOUS EVERY 6 HOURS PRN
Status: DISCONTINUED | OUTPATIENT
Start: 2024-10-06 | End: 2024-10-07 | Stop reason: HOSPADM

## 2024-10-06 RX ORDER — SODIUM CHLORIDE 0.9 % (FLUSH) 0.9 %
5-40 SYRINGE (ML) INJECTION PRN
Status: DISCONTINUED | OUTPATIENT
Start: 2024-10-06 | End: 2024-10-07 | Stop reason: HOSPADM

## 2024-10-06 RX ORDER — SODIUM CHLORIDE 0.9 % (FLUSH) 0.9 %
5-40 SYRINGE (ML) INJECTION EVERY 12 HOURS SCHEDULED
Status: DISCONTINUED | OUTPATIENT
Start: 2024-10-06 | End: 2024-10-07 | Stop reason: HOSPADM

## 2024-10-06 RX ORDER — KETOROLAC TROMETHAMINE 30 MG/ML
INJECTION, SOLUTION INTRAMUSCULAR; INTRAVENOUS
Status: COMPLETED
Start: 2024-10-06 | End: 2024-10-06

## 2024-10-06 RX ORDER — FAMOTIDINE 10 MG/ML
20 INJECTION, SOLUTION INTRAVENOUS 2 TIMES DAILY
Status: DISCONTINUED | OUTPATIENT
Start: 2024-10-06 | End: 2024-10-07 | Stop reason: HOSPADM

## 2024-10-06 RX ORDER — FERROUS SULFATE 325(65) MG
325 TABLET ORAL EVERY OTHER DAY
Status: DISCONTINUED | OUTPATIENT
Start: 2024-10-06 | End: 2024-10-07 | Stop reason: HOSPADM

## 2024-10-06 RX ORDER — HYDROXYCHLOROQUINE SULFATE 200 MG/1
200 TABLET, FILM COATED ORAL DAILY
Status: DISCONTINUED | OUTPATIENT
Start: 2024-10-06 | End: 2024-10-06

## 2024-10-06 RX ORDER — LIDOCAINE HYDROCHLORIDE 10 MG/ML
INJECTION, SOLUTION INFILTRATION; PERINEURAL
Status: DISPENSED
Start: 2024-10-06 | End: 2024-10-06

## 2024-10-06 RX ORDER — SODIUM CHLORIDE 9 MG/ML
INJECTION, SOLUTION INTRAVENOUS PRN
Status: DISCONTINUED | OUTPATIENT
Start: 2024-10-06 | End: 2024-10-07 | Stop reason: HOSPADM

## 2024-10-06 RX ORDER — METRONIDAZOLE 250 MG/1
500 TABLET ORAL EVERY 8 HOURS SCHEDULED
Status: COMPLETED | OUTPATIENT
Start: 2024-10-06 | End: 2024-10-06

## 2024-10-06 RX ORDER — ACETAMINOPHEN 500 MG
1000 TABLET ORAL EVERY 8 HOURS SCHEDULED
Status: DISCONTINUED | OUTPATIENT
Start: 2024-10-06 | End: 2024-10-07 | Stop reason: HOSPADM

## 2024-10-06 RX ORDER — KETOROLAC TROMETHAMINE 30 MG/ML
30 INJECTION, SOLUTION INTRAMUSCULAR; INTRAVENOUS ONCE
Status: COMPLETED | OUTPATIENT
Start: 2024-10-06 | End: 2024-10-06

## 2024-10-06 RX ORDER — IBUPROFEN 800 MG/1
800 TABLET, FILM COATED ORAL EVERY 8 HOURS SCHEDULED
Status: DISCONTINUED | OUTPATIENT
Start: 2024-10-06 | End: 2024-10-07 | Stop reason: HOSPADM

## 2024-10-06 RX ORDER — DOCUSATE SODIUM 100 MG/1
100 CAPSULE, LIQUID FILLED ORAL 2 TIMES DAILY
Status: DISCONTINUED | OUTPATIENT
Start: 2024-10-06 | End: 2024-10-07 | Stop reason: HOSPADM

## 2024-10-06 RX ORDER — ONDANSETRON 4 MG/1
4 TABLET, ORALLY DISINTEGRATING ORAL EVERY 6 HOURS PRN
Status: DISCONTINUED | OUTPATIENT
Start: 2024-10-06 | End: 2024-10-07 | Stop reason: HOSPADM

## 2024-10-06 RX ORDER — HYDROXYCHLOROQUINE SULFATE 200 MG/1
200 TABLET, FILM COATED ORAL NIGHTLY
Status: DISCONTINUED | OUTPATIENT
Start: 2024-10-06 | End: 2024-10-07 | Stop reason: HOSPADM

## 2024-10-06 RX ADMIN — DOCUSATE SODIUM 100 MG: 100 CAPSULE, LIQUID FILLED ORAL at 20:42

## 2024-10-06 RX ADMIN — WITCH HAZEL: 500 SOLUTION RECTAL; TOPICAL at 20:40

## 2024-10-06 RX ADMIN — SODIUM CHLORIDE, PRESERVATIVE FREE 10 ML: 5 INJECTION INTRAVENOUS at 08:42

## 2024-10-06 RX ADMIN — IBUPROFEN 800 MG: 800 TABLET, FILM COATED ORAL at 08:42

## 2024-10-06 RX ADMIN — IBUPROFEN 800 MG: 800 TABLET, FILM COATED ORAL at 16:38

## 2024-10-06 RX ADMIN — SODIUM CHLORIDE, PRESERVATIVE FREE 10 ML: 5 INJECTION INTRAVENOUS at 20:44

## 2024-10-06 RX ADMIN — ACETAMINOPHEN 1000 MG: 500 TABLET ORAL at 04:46

## 2024-10-06 RX ADMIN — WITCH HAZEL: 500 SOLUTION RECTAL; TOPICAL at 03:18

## 2024-10-06 RX ADMIN — METRONIDAZOLE 500 MG: 250 TABLET ORAL at 13:38

## 2024-10-06 RX ADMIN — BENZOCAINE AND LEVOMENTHOL: 200; 5 SPRAY TOPICAL at 03:18

## 2024-10-06 RX ADMIN — ACETAMINOPHEN 1000 MG: 500 TABLET ORAL at 20:42

## 2024-10-06 RX ADMIN — GENTAMICIN SULFATE 126 MG: 40 INJECTION, SOLUTION INTRAMUSCULAR; INTRAVENOUS at 02:12

## 2024-10-06 RX ADMIN — ACETAMINOPHEN 1000 MG: 500 TABLET ORAL at 12:39

## 2024-10-06 RX ADMIN — DOCUSATE SODIUM 100 MG: 100 CAPSULE, LIQUID FILLED ORAL at 08:42

## 2024-10-06 RX ADMIN — METRONIDAZOLE 500 MG: 250 TABLET ORAL at 21:52

## 2024-10-06 RX ADMIN — METRONIDAZOLE 500 MG: 250 TABLET ORAL at 06:07

## 2024-10-06 RX ADMIN — FAMOTIDINE 20 MG: 10 INJECTION, SOLUTION INTRAVENOUS at 18:14

## 2024-10-06 RX ADMIN — KETOROLAC TROMETHAMINE 30 MG: 30 INJECTION, SOLUTION INTRAMUSCULAR at 00:33

## 2024-10-06 RX ADMIN — KETOROLAC TROMETHAMINE 30 MG: 30 INJECTION, SOLUTION INTRAMUSCULAR; INTRAVENOUS at 00:33

## 2024-10-06 ASSESSMENT — PAIN - FUNCTIONAL ASSESSMENT
PAIN_FUNCTIONAL_ASSESSMENT: ACTIVITIES ARE NOT PREVENTED

## 2024-10-06 ASSESSMENT — PAIN SCALES - GENERAL
PAINLEVEL_OUTOF10: 3
PAINLEVEL_OUTOF10: 7
PAINLEVEL_OUTOF10: 1
PAINLEVEL_OUTOF10: 2
PAINLEVEL_OUTOF10: 4

## 2024-10-06 ASSESSMENT — PAIN DESCRIPTION - LOCATION
LOCATION: ABDOMEN
LOCATION: HIP
LOCATION: PERINEUM
LOCATION: ABDOMEN
LOCATION: PERINEUM
LOCATION: ABDOMEN

## 2024-10-06 ASSESSMENT — PAIN DESCRIPTION - DESCRIPTORS
DESCRIPTORS: CRAMPING
DESCRIPTORS: CRAMPING
DESCRIPTORS: SORE
DESCRIPTORS: SORE
DESCRIPTORS: CRAMPING

## 2024-10-06 ASSESSMENT — PAIN DESCRIPTION - ORIENTATION: ORIENTATION: RIGHT

## 2024-10-06 NOTE — PROGRESS NOTES
10/06/24 0642   Rituals, Rites and Sacraments   Type Sacrament (Comment)  (Patient is on the Holy Communion list.)     Eucharistic  will see patient on next visit (scheduled for today).

## 2024-10-06 NOTE — PROGRESS NOTES
Pt able to ambulate to bathroom with stand by assist.  Pt unable to urinate still.  Will attempt again in 1-2 hours and if pt is still not able to urinate will straight cath pt.

## 2024-10-06 NOTE — PLAN OF CARE
Problem: Pain  Goal: Verbalizes/displays adequate comfort level or baseline comfort level  10/6/2024 0810 by Rachna Bender RN  Outcome: Progressing  10/6/2024 0152 by Piter Askew RN  Outcome: Progressing  10/5/2024 1938 by Piter Askew RN  Outcome: Progressing     Problem: Postpartum  Goal: Experiences normal postpartum course  Description:  Postpartum OB-Pregnancy care plan goal which identifies if the mother is experiencing a normal postpartum course  10/6/2024 0810 by Rachna Bender RN  Outcome: Progressing  10/6/2024 0152 by Piter Askew RN  Outcome: Progressing  Goal: Appropriate maternal -  bonding  Description:  Postpartum OB-Pregnancy care plan goal which identifies if the mother and  are bonding appropriately  10/6/2024 0810 by Rachna Bender RN  Outcome: Progressing  10/6/2024 0152 by Piter Askew RN  Outcome: Progressing  Goal: Establishment of infant feeding pattern  Description:  Postpartum OB-Pregnancy care plan goal which identifies if the mother is establishing a feeding pattern with their   10/6/2024 0810 by Rachna Bender RN  Outcome: Progressing  10/6/2024 0152 by Piter Askew RN  Outcome: Progressing  Goal: Incisions, wounds, or drain sites healing without S/S of infection  10/6/2024 0810 by Rachna Bender RN  Outcome: Progressing  10/6/2024 0152 by Piter Askew RN  Outcome: Progressing     Problem: Infection - Adult  Goal: Absence of infection at discharge  10/6/2024 0810 by Rachna Bender RN  Outcome: Progressing  10/6/2024 0152 by Piter Askew RN  Outcome: Progressing  10/5/2024 1938 by Piter Askew RN  Outcome: Progressing  Goal: Absence of infection during hospitalization  10/6/2024 0810 by Rachna Bender RN  Outcome: Progressing  10/6/2024 0152 by Piter Askew RN  Outcome: Progressing  10/5/2024 1938 by Piter Askew RN  Outcome: Progressing  Goal: Absence of fever/infection

## 2024-10-06 NOTE — PLAN OF CARE
Problem: Pain  Goal: Verbalizes/displays adequate comfort level or baseline comfort level  10/6/2024 0152 by Piter Askew RN  Outcome: Progressing  10/5/2024 1938 by Piter Askew RN  Outcome: Progressing     Problem: Postpartum  Goal: Experiences normal postpartum course  Description:  Postpartum OB-Pregnancy care plan goal which identifies if the mother is experiencing a normal postpartum course  Outcome: Progressing  Goal: Appropriate maternal -  bonding  Description:  Postpartum OB-Pregnancy care plan goal which identifies if the mother and  are bonding appropriately  Outcome: Progressing  Goal: Establishment of infant feeding pattern  Description:  Postpartum OB-Pregnancy care plan goal which identifies if the mother is establishing a feeding pattern with their   Outcome: Progressing  Goal: Incisions, wounds, or drain sites healing without S/S of infection  Outcome: Progressing     Problem: Infection - Adult  Goal: Absence of infection at discharge  10/6/2024 0152 by Piter Askew RN  Outcome: Progressing  10/5/2024 1938 by Piter Askew RN  Outcome: Progressing  Goal: Absence of infection during hospitalization  10/6/2024 0152 by Piter Askew RN  Outcome: Progressing  10/5/2024 1938 by Piter Askew RN  Outcome: Progressing  Goal: Absence of fever/infection during anticipated neutropenic period  10/6/2024 0152 by Piter Askew RN  Outcome: Progressing  10/5/2024 1938 by Piter Askew RN  Outcome: Progressing     Problem: Safety - Adult  Goal: Free from fall injury  10/6/2024 0152 by Piter Askew RN  Outcome: Progressing  10/5/2024 1938 by Piter Askew RN  Outcome: Progressing     Problem: Discharge Planning  Goal: Discharge to home or other facility with appropriate resources  10/6/2024 0152 by Piter Askew RN  Outcome: Progressing  10/5/2024 1938 by Piter Askew RN  Outcome: Progressing     Problem: Chronic Conditions and

## 2024-10-06 NOTE — PROGRESS NOTES
Pt assisted to bathroom at 0555 to attempt to urinate.  Unable to again.  Straight cath perform at 0615 with return of 1000 ml clear yellow urine.  Fundus firm with scant bleeding.  Pt told she will need to try to get up to urinate in next 1-2 hours before bladder fills up again.

## 2024-10-06 NOTE — PROGRESS NOTES
Pt desires to get up to bathroom.  Pt unable to securely stand of feet.  Stedy used for transport to bathroom.  Pt unable to urinate at this time.  Elizabeth care performed.  Pt returned to bed per stedy.  Will try again after pt rests.  Pt tolerates well.

## 2024-10-06 NOTE — H&P
Buprenorphine Urine 10/05/2024 Neg     pH, Urine 10/05/2024 6.0     FENTANYL SCREEN, URINE 10/05/2024 Neg     Drug Screen Comment: 10/05/2024 see below        Assessment/Plan:   24 y.o.  at 40w0d EGA in latent labor:     Gave patient the option to continue to labor at home as she is in latent phase vs active mng on LD with augmentation as needed. She declines RCS in favor of TOLAC. Pt would like to stay for obs / possible augmentation / TOLAC.   Admit to labor and delivery  Routine labor orders / labs pending   A+ / RI / GBS(-)   IOL: Will continue to monitor for now and recheck every few hours, with no cervical change will either recommend IV Pitocin or Butler catheter.   Pain control: IV/PO meds, NO, epidural as needed for pain   Reassuring fetal status -- Cat 1   2. Inflammatory Arthritis -- continue daily medications     Please call with any questions or concerns.   Karlie Higgins,

## 2024-10-06 NOTE — L&D DELIVERY SUMMARY NOTE
[facility] Obstetrics and Gynecology  Spontaneous Vaginal Delivery Note        Pre-operative Diagnosis:    24 y.o.  @ 40w1d, here for latent labor, hx of CS x 1  Desires TOLAC   Pregnancy complicated by Size dates discrepancy, prolonged fever/virus late 1st, early 2nd trimester, Covid-19, prednisone use, polyhydramnios--resolved (26 cm, 22.79 cm, current 19.23 cm), and previous  (desires TOLAC)   A+ / RI / GBS (-)      Post-operative Diagnosis:   Same   Successful   2nd degree + bilateral labial lacerations              Anesthesia:  Epidural       Admission and Delivery:       Patient presented to [facility] Labor and Delivery for Latent labor and desire for augmentation/ TOLAC.  She received IV Pitocin and Epidural for comfort.      Patient progressed spontaneously to complete cervical dilation.  Began pushing and with good maternal effort.  The infant was delivered in the OLIVER position. Nuchal cord absent. The shoulders and body were quickly to follow with maternal efforts. Infant was delivered with good tone and spontaneous cry without complication. Mouth and nose were bulb suctioned at maternal abdomen. Delayed cord clamping, partner cut cord after 1 min as cord no longer pulsating. Cord segment and cord blood were obtained.  Cord gasses were collected, however due to fetal wellbeing were discarded.  APGARS were noted to be 8 and 9. Delivery Date and Time: 10/6/2024 0007 .      The placenta was delivered spontaneously with manual massage of the uterus and was noted to be intact with a 3 vessel cord. Pitocin was started immediately after placenta delivery. Bleeding noted to be appropriate.     2nd degree laceration with bilateral labial extensions were noted and repaired  in usual fashion with 2-0 Vicryl.      The patient tolerated well and is in stable condition following delivery.      EBL: 100 ml     Infant Wt: Pending     APGARS: 8, 9      Specimen:  Placenta / Cord segment

## 2024-10-07 VITALS
HEART RATE: 78 BPM | TEMPERATURE: 98.1 F | HEIGHT: 66 IN | DIASTOLIC BLOOD PRESSURE: 78 MMHG | BODY MASS INDEX: 29.8 KG/M2 | WEIGHT: 185.4 LBS | RESPIRATION RATE: 18 BRPM | SYSTOLIC BLOOD PRESSURE: 131 MMHG | OXYGEN SATURATION: 98 %

## 2024-10-07 LAB
HCT VFR BLD AUTO: 31.5 % (ref 36–48)
HGB BLD-MCNC: 10.9 G/DL (ref 12–16)

## 2024-10-07 PROCEDURE — 85018 HEMOGLOBIN: CPT

## 2024-10-07 PROCEDURE — 99024 POSTOP FOLLOW-UP VISIT: CPT | Performed by: OBSTETRICS & GYNECOLOGY

## 2024-10-07 PROCEDURE — 36415 COLL VENOUS BLD VENIPUNCTURE: CPT

## 2024-10-07 PROCEDURE — 6370000000 HC RX 637 (ALT 250 FOR IP): Performed by: OBSTETRICS & GYNECOLOGY

## 2024-10-07 PROCEDURE — 85014 HEMATOCRIT: CPT

## 2024-10-07 RX ORDER — FERROUS SULFATE 325(65) MG
325 TABLET ORAL EVERY OTHER DAY
Qty: 30 TABLET | Refills: 3 | Status: SHIPPED | OUTPATIENT
Start: 2024-10-08

## 2024-10-07 RX ORDER — PSEUDOEPHEDRINE HCL 30 MG
100 TABLET ORAL 2 TIMES DAILY PRN
Qty: 30 CAPSULE | Refills: 1 | Status: SHIPPED | OUTPATIENT
Start: 2024-10-07

## 2024-10-07 RX ORDER — IBUPROFEN 800 MG/1
800 TABLET, FILM COATED ORAL EVERY 8 HOURS PRN
Qty: 40 TABLET | Refills: 1 | Status: SHIPPED | OUTPATIENT
Start: 2024-10-07

## 2024-10-07 RX ADMIN — BENZOCAINE AND LEVOMENTHOL: 200; 5 SPRAY TOPICAL at 10:11

## 2024-10-07 RX ADMIN — IBUPROFEN 800 MG: 800 TABLET, FILM COATED ORAL at 10:12

## 2024-10-07 RX ADMIN — IBUPROFEN 800 MG: 800 TABLET, FILM COATED ORAL at 00:46

## 2024-10-07 RX ADMIN — ACETAMINOPHEN 1000 MG: 500 TABLET ORAL at 04:37

## 2024-10-07 RX ADMIN — DOCUSATE SODIUM 100 MG: 100 CAPSULE, LIQUID FILLED ORAL at 10:12

## 2024-10-07 RX ADMIN — WITCH HAZEL: 500 SOLUTION RECTAL; TOPICAL at 10:11

## 2024-10-07 ASSESSMENT — PAIN SCALES - GENERAL: PAINLEVEL_OUTOF10: 0

## 2024-10-07 NOTE — PROGRESS NOTES
Post Partum Progress Note    Subjective:  Sharon You is a 24 y.o.  status-post  uncomplicated . The pregnancy was complicated by  hx of CS x 1 (desires TOLAC), Uterine Size-Dates discrepancy, Poly (resolved), CARY (resolved), Inflammatory arthritis (Plaquenil) .     Patient is doing well with no concerns.  Pain is well controlled with current regimen.  Lochia moderate.  Tolerating regular diet without difficulty.  Ambulating without difficulty, denies dizziness on standing.  Voiding without difficulty.  She is breast feeding.  Denies chest pain, SOB, or leg pain.    Objective:  /86   Pulse 83   Temp 98 °F (36.7 °C) (Oral)   Resp 18   Ht 1.676 m (5' 6\")   Wt 84.1 kg (185 lb 6.4 oz)   LMP 2023 (Exact Date)   SpO2 98%   Breastfeeding Unknown   BMI 29.92 kg/m²     GENERAL APPEARANCE: alert, well appearing, in no apparent distress  LUNGS: nonlabored  HEART: regular rate and rhythm  ABDOMEN POSTPARTUM: soft, nontender, fundus firm at U  EXTREMITIES: no redness or tenderness in the calves or thighs, no edema    I/O last 3 completed shifts:  In: 2649.2 [I.V.:2549.2; IV Piggyback:100]  Out:  [Urine:1725; Blood:374]    Pertinent Labs:   CBC:   Recent Labs     10/05/24  0225 10/07/24  0617   WBC 14.3*  --    HGB 13.9 10.9*   HCT 40.8 31.5*     --      BMP:  No results for input(s): \"NA\", \"K\", \"CL\", \"CO2\", \"BUN\", \"CREATININE\", \"GLUCOSE\" in the last 72 hours.  Hepatic: No results for input(s): \"AST\", \"ALT\", \"BILITOT\", \"ALKPHOS\" in the last 72 hours.    Invalid input(s): \"ALB\"  Mag:    No results for input(s): \"MG\" in the last 72 hours.   Phos:   No results for input(s): \"PHOS\" in the last 72 hours.   INR: No results for input(s): \"INR\" in the last 72 hours.    Assessment/Plan:  Sharon You is a 24 y.o.  status-post  uncomplicated .     1. PPD #1: doing well, routine care  - Hgb 10.9 this AM, acute blood loss anemia, will d/c with iron and colace    2. Infant: M, ,

## 2024-10-07 NOTE — PROGRESS NOTES
Discharge Phone Call    Patient Name: Sharon You     OB Care Provider: Elda Melgar DO Discharge Date: 10/7/2024    Disposition of baby:    Phone Number: [phone] (home)     Attempts to Contact:  Date:    Caller  Date:    Caller  Date:    Caller    Information for the patient's :  Antione You [[id number]]   Delivery Method: , Spontaneous    1.  Now that you are at home is your pain being well controlled?   Y/N   If no, instruct to call       provider.      2. Are you breastfeeding?    Y/N    Do you need any extra support from our lactation staff?      Y/N    If yes, provide number for lactation.  3. Have you made or already had your first appointment with the baby's doctor? Y/N   If no, do      you know when to schedule it?  Y/N    4. Have you scheduled your follow-up appointment?  Y/N  If no, do you know when to schedule       it?    Y/N   If no, they can find it on printed discharge instructions.  5. Did staff discuss safe sleep during your stay? Y/N   6. Did we explain things in a way you could understand?  Y/N  7. Were we respectful of your preferences for labor and birth and include you in the plan of       care?  Y/N  If no, please explain _______________________________________________  8. Is there anyone in particular you would like to mention who provided care for you? _______      _________________________________________________________________________     9. Were you given a Post-Birth Warning Signs handout?  Y/N  Do you have it somewhere      easily accessible?  Y/N  If no, please send them a copy and ask them to put it somewhere      easily found.  10. Have you been crying excessively, having anger or mood swings that feel out of control, or       feel like you can't cope with caring for yourself or baby? Y/N   If yes, they may be showing       signs of postpartum depression and should call provider. There is also a        depression test on page C5 in their

## 2024-10-07 NOTE — DISCHARGE SUMMARY
kit 1 kit by Does not apply route daily  Qty: 1 kit, Refills: 0    Associated Diagnoses: Polyhydramnios affecting pregnancy      Lancets MISC 1 each by Does not apply route daily  Qty: 100 each, Refills: 1    Associated Diagnoses: Polyhydramnios affecting pregnancy           STOP taking these medications       blood glucose monitor strips Comments:   Reason for Stopping:         clotrimazole (LOTRIMIN) 2 % CREA vaginal cream Comments:   Reason for Stopping:         Omega-3 Fatty Acids (FISH OIL) 1000 MG CAPS Comments:   Reason for Stopping:               Electronically signed by Jayla Corona MD on 10/7/2024 at 10:04 AM

## 2024-10-07 NOTE — ANESTHESIA POSTPROCEDURE EVALUATION
Department of Anesthesiology  Postprocedure Note    Patient: Sharon You  MRN: [id number]  YOB: 2000  Date of evaluation: 10/7/2024    Procedure Summary       Date: 10/05/24 Room / Location:     Anesthesia Start: 1325 Anesthesia Stop: 10/06/24 0007    Procedure: Labor Analgesia Diagnosis:     Scheduled Providers:  Responsible Provider: Brendon Walker MD    Anesthesia Type: general, spinal, epidural, CSE ASA Status: 3            Anesthesia Type: No value filed.    Eliu Phase I: Eliu Score: 9    Eliu Phase II: Eliu Score: 10    Anesthesia Post Evaluation    Nausea & Vomiting: no nausea  Cardiovascular status: hemodynamically stable  Comments: Pt. D/C'd to home. Flow-sheet and notes reviewed. No apparent anesthesia related complications        No notable events documented.

## 2024-10-07 NOTE — LACTATION NOTE
This note was copied from a baby's chart.  LACTATION CONSULTATION  Initial Lactation Consult:   Referred by: Lactation consultant follow up     Name: Antione You       MRN: [id number]         YOB: 2026   Time of Birth: 12:07 AM   Gestational age: Gestational Age: 40w1d   Birth Weight: Birth Weight: 3.572 kg (7 lb 14 oz) Most Recent Weight: Weight: 3.572 kg (7 lb 14 oz) (Filed from Delivery Summary)   Weight Change from Birth: 0%           Maternal Assessment:  Maternal Data:  Information for the patient's mother:  Sharon You [[id number]]   26 y.o.  /Para:   Information for the patient's mother:  Sharon You [[id number]]     Information for the patient's mother:  Sharon You [[id number]]   40w1d    Prenatal Breastfeeding Education: Prior Success in Breastfeeding     Prior Breastfeeding Experience:  other Children  and  for: 1.5 years    Breastfeeding Goal: Exclusively Breastfeed     Breast Assessment  Right Breast: Breasts not assessed this encounter   Left Breast: Breasts not assessed this encounter     Medications of Concern:Zoloft (L2), Plaquenil (L2) infant considerations with prolonged exposure monitor vision, and Medications Acceptable for breastfeeding     Maternal Toxicology:   Information for the patient's mother:  Sharon You [[id number]]     Barbiturate Screen, Ur   Date Value Ref Range Status   10/05/2024 Neg Negative <200 ng/mL Final     Benzodiazepine Screen, Urine   Date Value Ref Range Status   10/05/2024 Neg Negative <200 ng/mL Final     Cannabinoid Scrn, Ur   Date Value Ref Range Status   10/05/2024 Neg Negative <50 ng/mL Final     Cocaine Metabolite Screen, Urine   Date Value Ref Range Status   10/05/2024 Neg Negative <300 ng/mL Final     Methadone Screen, Urine   Date Value Ref Range Status   10/05/2024 Neg Negative <300 ng/mL Final     Propoxyphene Scrn, Ur   Date Value Ref Range Status   2022 Neg Negative <300 ng/mL 
Lactation Services , and [facility]           Action/Plan:  Breastfeed on cue and at least 8 times/24 hours, unlimited timing. May not nurse this often in the first 24 hours. Wake baby and offer breastfeeding if it has been 3 hours since the beginning of last feeding. Place infant skin to skin if infant will not breastfeed at 3 hours.   Hand express prior to latch to charly nipple and entice infant to the breast.   It is important to use gentle stimulation during feeding to promote active eating. Offer both breasts at every feeding. Burp infant in between sides. Alternate which breast is used first.   Offer STS often while awake. Mother holding infant skin to skin between feedings will promote milk supply and allow infant to rest more deeply.   Maintain a feeding log until infant is gaining weight and seen by primary care physician.   Request breastfeeding assistance from LC or RN as needed.     Feeding Plan reviewed with: Mom    Response:   Verbalized understanding of education and instruction, Active in care, Demonstrates latch well , Bonding well , and Pleased

## 2024-10-07 NOTE — DISCHARGE INSTRUCTIONS
Thank you for the opportunity to care for you and your family.    We hope that you are happy with the care we provided during your stay in the [facility]. We want to ensure that you have the help you need when you leave the hospital. If there is anything we can assist you with, please let us know.    Breastfeeding mothers may contact our lactation specialists with any problems or questions.  [facility] lactation services phone number is [phone].  Leave a message and your call will be returned.    Please refer to the information provided in the postpartum care booklet.  The following are warning signs to remember.    Call 911 if you have:    Chest pain or pressure  Shortness of breath, even at rest  Thoughts of harming yourself or others  Seizures    Call your healthcare provider if you have:    Temperature of 100.4 degrees or higher  Stitches that are not healing        -- Swelling, bleeding, drainage, foul odor, redness or warmth in/around your           stitches, staples, or incision (scar)        -- Bad smelling blood or discharge from the vagina  Vaginal bleeding that has increased         -- Soaking through one pad in an hour        -- You are passing clots larger than the size of a lemon  Red, warm tender area(s) in your breast or calf  Headache that does not get better, even after taking medicine; or headache with vision changes    Remember to notify all healthcare providers from your date of delivery to up to one year after giving birth!    CARING FOR YOURSELF        DIET/ACTIVITY    Eat a well balanced diet focusing on foods high in fiber and protein.  Drink plenty of fluids, especially water.  To avoid constipation you may take a mild stool softener as recommended by your doctor or midwife.  Gradually increase your activity. Resume an exercise regime only after being advised by your doctor or midwife.  When sitting or lying down, keep your legs elevated to reduce swelling.  Avoid

## 2024-10-23 ENCOUNTER — POSTPARTUM VISIT (OUTPATIENT)
Dept: OBGYN CLINIC | Age: 24
End: 2024-10-23

## 2024-10-23 VITALS
BODY MASS INDEX: 27.47 KG/M2 | DIASTOLIC BLOOD PRESSURE: 70 MMHG | SYSTOLIC BLOOD PRESSURE: 128 MMHG | WEIGHT: 170.2 LBS | HEART RATE: 79 BPM

## 2024-10-23 DIAGNOSIS — Z98.891 HISTORY OF CESAREAN SECTION, LOW TRANSVERSE: ICD-10-CM

## 2024-10-23 DIAGNOSIS — M19.90 INFLAMMATORY ARTHRITIS: ICD-10-CM

## 2024-10-23 DIAGNOSIS — O34.219 VBAC, DELIVERED: ICD-10-CM

## 2024-10-23 DIAGNOSIS — L70.8 OTHER ACNE: ICD-10-CM

## 2024-10-23 DIAGNOSIS — F41.9 ANXIETY: ICD-10-CM

## 2024-10-23 RX ORDER — IBUPROFEN 800 MG/1
800 TABLET, FILM COATED ORAL EVERY 8 HOURS PRN
Qty: 40 TABLET | Refills: 1 | Status: SHIPPED | OUTPATIENT
Start: 2024-10-23

## 2024-11-02 NOTE — ED PROVIDER NOTES
EKG: Sinus tachycardia, rate of 125 poor R wave progression. Rhythm strip shows sinus tachycardia with a rate of 125, AR interval 146 ms, QRS of 80 ms with no other ectopy as interpreted by me. No old EKG available for comparison.      Henrique Groves MD  10/22/23 Deyvi Mujica
couple of days. The patient and her father did express understanding of the diagnosis and the treatment plan. I am the Primary Clinician of Record. FINAL IMPRESSION      1. Viral illness    2. Fever in adult    3. Elevated liver enzymes    4. Dehydration          DISPOSITION/PLAN     DISPOSITION Decision To Discharge 10/22/2023 06:20:27 PM      PATIENT REFERRED TO:  Celena Jaimes MD  [address] [facility] [address] [phone]    Schedule an appointment as soon as possible for a visit in 2 days      [facility]  [address] [address] [phone]  Go to   If symptoms worsen      DISCHARGE MEDICATIONS:  Discharge Medication List as of 10/22/2023  7:00 PM        START taking these medications    Details   ondansetron (ZOFRAN-ODT) 4 MG disintegrating tablet Place 1 tablet under the tongue every 8 hours as needed for Nausea or Vomiting, Disp-10 tablet, R-0Normal      cefUROXime (CEFTIN) 250 MG tablet Take 1 tablet by mouth 2 times daily for 7 days, Disp-14 tablet, R-0Normal             DISCONTINUED MEDICATIONS:  Discharge Medication List as of 10/22/2023  7:00 PM        STOP taking these medications       Omega-3 Fatty Acids (FISH OIL) 1000 MG CAPS Comments:   Reason for Stopping:                      (Please note that portions of this note were completed with a voice recognition program.  Efforts were made to edit the dictations but occasionally words are mis-transcribed. )    Mercedes Abreu PA-C (electronically signed)        Mercedes Abreu PA-C  10/23/23 [id number]       Mercedes Abreu PA-C  10/23/23 [id number]
Normal vision: sees adequately in most situations; can see medication labels, newsprint

## 2024-11-03 DIAGNOSIS — F41.9 ANXIETY: ICD-10-CM

## 2024-11-03 PROBLEM — Z34.83 PRENATAL CARE, SUBSEQUENT PREGNANCY IN THIRD TRIMESTER: Status: RESOLVED | Noted: 2024-03-30 | Resolved: 2024-11-03

## 2024-11-03 PROBLEM — O26.849 UTERINE SIZE DATE DISCREPANCY PREGNANCY: Status: RESOLVED | Noted: 2024-05-24 | Resolved: 2024-11-03

## 2024-11-03 PROBLEM — O40.3XX0 POLYHYDRAMNIOS IN THIRD TRIMESTER: Status: RESOLVED | Noted: 2024-07-31 | Resolved: 2024-11-03

## 2024-11-03 PROBLEM — O98.512 COVID-19 AFFECTING PREGNANCY IN SECOND TRIMESTER: Status: RESOLVED | Noted: 2024-05-24 | Resolved: 2024-11-03

## 2024-11-03 PROBLEM — U07.1 COVID-19 AFFECTING PREGNANCY IN SECOND TRIMESTER: Status: RESOLVED | Noted: 2024-05-24 | Resolved: 2024-11-03

## 2024-11-03 PROBLEM — O34.219 DESIRES VBAC (VAGINAL BIRTH AFTER CESAREAN) TRIAL: Status: RESOLVED | Noted: 2024-10-05 | Resolved: 2024-11-03

## 2024-11-04 RX ORDER — SERTRALINE HYDROCHLORIDE 25 MG/1
TABLET, FILM COATED ORAL
Qty: 90 TABLET | Refills: 1 | Status: SHIPPED | OUTPATIENT
Start: 2024-11-04

## 2024-11-06 ENCOUNTER — POSTPARTUM VISIT (OUTPATIENT)
Dept: OBGYN CLINIC | Age: 24
End: 2024-11-06

## 2024-11-06 VITALS
SYSTOLIC BLOOD PRESSURE: 126 MMHG | HEIGHT: 66 IN | WEIGHT: 170.8 LBS | HEART RATE: 90 BPM | BODY MASS INDEX: 27.45 KG/M2 | DIASTOLIC BLOOD PRESSURE: 68 MMHG

## 2024-11-06 DIAGNOSIS — Z98.891 HISTORY OF CESAREAN SECTION, LOW TRANSVERSE: ICD-10-CM

## 2024-11-06 DIAGNOSIS — O34.219 VBAC, DELIVERED: ICD-10-CM

## 2024-11-10 ENCOUNTER — TELEPHONE (OUTPATIENT)
Dept: OBGYN CLINIC | Age: 24
End: 2024-11-10

## 2024-11-10 RX ORDER — DICLOXACILLIN SODIUM 500 MG/1
500 CAPSULE ORAL 4 TIMES DAILY
Qty: 28 CAPSULE | Refills: 0 | Status: SHIPPED | OUTPATIENT
Start: 2024-11-10 | End: 2024-11-17

## 2024-11-22 ENCOUNTER — POSTPARTUM VISIT (OUTPATIENT)
Dept: OBGYN CLINIC | Age: 24
End: 2024-11-22

## 2024-11-22 VITALS
DIASTOLIC BLOOD PRESSURE: 62 MMHG | BODY MASS INDEX: 27.93 KG/M2 | HEART RATE: 95 BPM | HEIGHT: 66 IN | SYSTOLIC BLOOD PRESSURE: 132 MMHG | WEIGHT: 173.8 LBS

## 2024-11-22 DIAGNOSIS — Z98.891 HISTORY OF CESAREAN SECTION, LOW TRANSVERSE: ICD-10-CM

## 2024-11-22 DIAGNOSIS — O34.219 VBAC, DELIVERED: ICD-10-CM

## 2024-11-22 PROCEDURE — 0503F POSTPARTUM CARE VISIT: CPT | Performed by: OBSTETRICS & GYNECOLOGY

## 2024-11-25 ENCOUNTER — OFFICE VISIT (OUTPATIENT)
Dept: FAMILY MEDICINE CLINIC | Age: 24
End: 2024-11-25

## 2024-11-25 VITALS
BODY MASS INDEX: 28.45 KG/M2 | WEIGHT: 177 LBS | TEMPERATURE: 97.5 F | RESPIRATION RATE: 16 BRPM | HEIGHT: 66 IN | OXYGEN SATURATION: 98 % | HEART RATE: 80 BPM

## 2024-11-25 DIAGNOSIS — H66.005 RECURRENT ACUTE SUPPURATIVE OTITIS MEDIA WITHOUT SPONTANEOUS RUPTURE OF LEFT TYMPANIC MEMBRANE: Primary | ICD-10-CM

## 2024-11-25 DIAGNOSIS — H92.02 LEFT EAR PAIN: ICD-10-CM

## 2024-11-25 RX ORDER — FLUCONAZOLE 150 MG/1
150 TABLET ORAL
Qty: 2 TABLET | Refills: 0 | Status: SHIPPED | OUTPATIENT
Start: 2024-11-25 | End: 2024-12-01

## 2024-11-25 NOTE — PROGRESS NOTES
erythema     Nose: Congestion present.      Mouth/Throat:      Mouth: Mucous membranes are moist.   Cardiovascular:      Rate and Rhythm: Normal rate and regular rhythm.   Pulmonary:      Effort: Pulmonary effort is normal.      Breath sounds: Normal breath sounds.   Lymphadenopathy:      Cervical: No cervical adenopathy.         Wt Readings from Last 3 Encounters:   11/25/24 80.3 kg (177 lb)   11/22/24 78.8 kg (173 lb 12.8 oz)   11/06/24 77.5 kg (170 lb 12.8 oz)     BP Readings from Last 3 Encounters:   11/22/24 132/62   11/06/24 126/68   10/23/24 128/70     Assessment/Plan:  1. Recurrent acute suppurative otitis media without spontaneous rupture of left tympanic membrane  - amoxicillin-clavulanate (AUGMENTIN) 875-125 MG per tablet; Take 1 tablet by mouth 2 times daily for 10 days  Dispense: 20 tablet; Refill: 0    2. Left ear pain    Treat otitis, likely secondary from viral infection  Call if symptoms worsen or fail to improve

## 2024-12-02 ASSESSMENT — ENCOUNTER SYMPTOMS
NAUSEA: 0
CONSTIPATION: 0
VOMITING: 0
SHORTNESS OF BREATH: 0
DIARRHEA: 0
GASTROINTESTINAL NEGATIVE: 1
RESPIRATORY NEGATIVE: 1
ABDOMINAL PAIN: 0

## 2024-12-02 NOTE — PROGRESS NOTES
Subjective   Patient ID: Sharon You is a 24 y.o. female.    HPI  23 y/o  female presents for postpartum visit.  Patient is 6 weeks status-post  uncomplicated  on 10/6/24. The pregnancy was complicated by hx of CS x 1 (desired TOLAC), Uterine Size-Dates discrepancy, Poly (resolved), CARY (resolved), Inflammatory arthritis (Plaquenil).   Denies bleeding.  Denies menses.  Sutures are still present.  Perineum continues to improve.    Denies headache, vision changes, RUQ pain, fever, chills, chest pain, shortness of breath, nausea, vomiting, dysuria and hematuria.   Breast feeding.   Treated for mastitis last week--resolved.     Review of Systems   Constitutional: Negative.  Negative for activity change, appetite change, chills, fatigue, fever and unexpected weight change.   Eyes:  Negative for visual disturbance.   Respiratory: Negative.  Negative for shortness of breath.    Cardiovascular: Negative.  Negative for chest pain.   Gastrointestinal: Negative.  Negative for abdominal pain, constipation, diarrhea, nausea and vomiting.   Endocrine: Negative for cold intolerance and heat intolerance.   Genitourinary:  Negative for difficulty urinating, dysuria, hematuria, pelvic pain, vaginal bleeding, vaginal discharge and vaginal pain.   Skin: Negative.    Neurological:  Negative for headaches.   Hematological:  Does not bruise/bleed easily.   Psychiatric/Behavioral: Negative.  Negative for dysphoric mood and suicidal ideas. The patient is not nervous/anxious.           Objective   Physical Exam  Vitals and nursing note reviewed. Exam conducted with a chaperone present.   Constitutional:       General: She is not in acute distress.     Appearance: Normal appearance. She is well-developed. She is not ill-appearing, toxic-appearing or diaphoretic.   Pulmonary:      Effort: Pulmonary effort is normal.   Genitourinary:     General: Normal vulva.      Exam position: Lithotomy position.      Pubic Area: No rash.

## 2024-12-04 ENCOUNTER — TELEPHONE (OUTPATIENT)
Dept: FAMILY MEDICINE CLINIC | Age: 24
End: 2024-12-04

## 2024-12-04 DIAGNOSIS — H66.005 RECURRENT ACUTE SUPPURATIVE OTITIS MEDIA WITHOUT SPONTANEOUS RUPTURE OF LEFT TYMPANIC MEMBRANE: ICD-10-CM

## 2024-12-04 NOTE — TELEPHONE ENCOUNTER
Sharon was seen 11/25 for ear and throat pain  On day 7 of abx and woke up with ear pain again, not as bad as previously but had resolved until today.  Please advise

## 2024-12-19 ENCOUNTER — PATIENT MESSAGE (OUTPATIENT)
Dept: FAMILY MEDICINE CLINIC | Age: 24
End: 2024-12-19

## 2024-12-19 DIAGNOSIS — F41.9 ANXIETY: ICD-10-CM

## 2024-12-23 ENCOUNTER — OFFICE VISIT (OUTPATIENT)
Dept: FAMILY MEDICINE CLINIC | Age: 24
End: 2024-12-23
Payer: COMMERCIAL

## 2024-12-23 VITALS
DIASTOLIC BLOOD PRESSURE: 68 MMHG | BODY MASS INDEX: 27.92 KG/M2 | OXYGEN SATURATION: 98 % | HEART RATE: 79 BPM | WEIGHT: 173 LBS | SYSTOLIC BLOOD PRESSURE: 122 MMHG | RESPIRATION RATE: 17 BRPM

## 2024-12-23 DIAGNOSIS — B34.9 VIRAL ILLNESS: Primary | ICD-10-CM

## 2024-12-23 DIAGNOSIS — Z12.83 SCREENING EXAM FOR SKIN CANCER: ICD-10-CM

## 2024-12-23 PROCEDURE — 99203 OFFICE O/P NEW LOW 30 MIN: CPT | Performed by: INTERNAL MEDICINE

## 2024-12-23 ASSESSMENT — ENCOUNTER SYMPTOMS: COUGH: 1

## 2025-01-07 ENCOUNTER — PATIENT MESSAGE (OUTPATIENT)
Dept: FAMILY MEDICINE CLINIC | Age: 25
End: 2025-01-07

## 2025-01-07 RX ORDER — POLYMYXIN B SULFATE AND TRIMETHOPRIM 1; 10000 MG/ML; [USP'U]/ML
1 SOLUTION OPHTHALMIC EVERY 6 HOURS
Qty: 2 ML | Refills: 0 | Status: SHIPPED | OUTPATIENT
Start: 2025-01-07 | End: 2025-01-17

## 2025-01-08 DIAGNOSIS — F41.9 ANXIETY: ICD-10-CM

## 2025-01-09 ENCOUNTER — OFFICE VISIT (OUTPATIENT)
Dept: FAMILY MEDICINE CLINIC | Age: 25
End: 2025-01-09

## 2025-01-09 VITALS
HEIGHT: 66 IN | SYSTOLIC BLOOD PRESSURE: 122 MMHG | BODY MASS INDEX: 27.74 KG/M2 | TEMPERATURE: 98.2 F | RESPIRATION RATE: 18 BRPM | OXYGEN SATURATION: 98 % | WEIGHT: 172.6 LBS | HEART RATE: 89 BPM | DIASTOLIC BLOOD PRESSURE: 74 MMHG

## 2025-01-09 DIAGNOSIS — J06.9 URI WITH COUGH AND CONGESTION: Primary | ICD-10-CM

## 2025-01-09 LAB
Lab: NORMAL
PERFORMING INSTRUMENT: NORMAL
QC PASS/FAIL: NORMAL
SARS-COV-2, POC: NORMAL

## 2025-01-09 ASSESSMENT — ENCOUNTER SYMPTOMS
DIARRHEA: 0
COUGH: 1
SHORTNESS OF BREATH: 0
SINUS PRESSURE: 1
ABDOMINAL PAIN: 0
SINUS PAIN: 1
RHINORRHEA: 1
VOMITING: 0
NAUSEA: 0

## 2025-01-09 ASSESSMENT — PATIENT HEALTH QUESTIONNAIRE - PHQ9
SUM OF ALL RESPONSES TO PHQ QUESTIONS 1-9: 0
SUM OF ALL RESPONSES TO PHQ QUESTIONS 1-9: 0
SUM OF ALL RESPONSES TO PHQ9 QUESTIONS 1 & 2: 0
1. LITTLE INTEREST OR PLEASURE IN DOING THINGS: NOT AT ALL
SUM OF ALL RESPONSES TO PHQ9 QUESTIONS 1 & 2: 0
2. FEELING DOWN, DEPRESSED OR HOPELESS: NOT AT ALL
1. LITTLE INTEREST OR PLEASURE IN DOING THINGS: NOT AT ALL
2. FEELING DOWN, DEPRESSED OR HOPELESS: NOT AT ALL
SUM OF ALL RESPONSES TO PHQ QUESTIONS 1-9: 0
SUM OF ALL RESPONSES TO PHQ QUESTIONS 1-9: 0

## 2025-01-09 NOTE — PROGRESS NOTES
2025    This is a 24 y.o. female   Chief Complaint   Patient presents with    Cough     Started . Cough.  Congestion.  Sinus pressure and drainage. Had nausea and vomiting Friday overnight. Headache.  Green mucous.  Fatigue. Ear popping.   .    HPI  Patient reports that on Friday 1/3 had nausea and vomiting and that resolved by . On  started with sinus congestion, rhinorrhea, PND, cough. Has had green mucus. Started with pink eye symptoms and started polytrim drop on . She reports that eye symptoms have been improving.   Denies fever, shortness of breath, abd pain.   Has taken tylenol and mucinex to help with symptoms.      Patient Active Problem List   Diagnosis    Family history of ulcerative colitis - father    Anxiety    Other acne    History of  section, low transverse    Lactating mother    Low testosterone level in female    Low serum progesterone    Atypical nevus of abdominal wall - 4x4mm, monitor    Inflammatory arthritis    , delivered       Current Outpatient Medications   Medication Sig Dispense Refill    sertraline (ZOLOFT) 50 MG tablet TAKE 1 TABLET BY MOUTH DAILY 90 tablet 0    trimethoprim-polymyxin b (POLYTRIM) 42511-6.1 UNIT/ML-% ophthalmic solution Place 1 drop into both eyes every 6 hours for 10 days 2 mL 0    ibuprofen (ADVIL;MOTRIN) 800 MG tablet Take 1 tablet by mouth every 8 hours as needed for Pain 40 tablet 1    ferrous sulfate (IRON 325) 325 (65 Fe) MG tablet Take 1 tablet by mouth every other day 30 tablet 3    docusate sodium (COLACE, DULCOLAX) 100 MG CAPS Take 100 mg by mouth 2 times daily as needed for Constipation 30 capsule 1    Prenatal Multivit-Min-Fe-FA (PRENATAL 1 + IRON PO) Take 1 tablet by mouth daily      glucose monitoring kit 1 kit by Does not apply route daily 1 kit 0    Lancets MISC 1 each by Does not apply route daily 100 each 1    clindamycin 1 % gel Apply topically 2 times daily Apply topically 2 times daily. 2 each 5

## 2025-02-15 ENCOUNTER — PATIENT MESSAGE (OUTPATIENT)
Dept: FAMILY MEDICINE CLINIC | Age: 25
End: 2025-02-15

## 2025-02-15 DIAGNOSIS — F41.9 ANXIETY AND DEPRESSION: Primary | ICD-10-CM

## 2025-02-15 DIAGNOSIS — F32.A ANXIETY AND DEPRESSION: Primary | ICD-10-CM

## 2025-02-21 ENCOUNTER — OFFICE VISIT (OUTPATIENT)
Dept: OBGYN CLINIC | Age: 25
End: 2025-02-21
Payer: COMMERCIAL

## 2025-02-21 VITALS
WEIGHT: 176.6 LBS | HEIGHT: 65 IN | OXYGEN SATURATION: 98 % | SYSTOLIC BLOOD PRESSURE: 130 MMHG | DIASTOLIC BLOOD PRESSURE: 72 MMHG | HEART RATE: 69 BPM | BODY MASS INDEX: 29.42 KG/M2

## 2025-02-21 DIAGNOSIS — Z98.891 HISTORY OF CESAREAN SECTION, LOW TRANSVERSE: ICD-10-CM

## 2025-02-21 DIAGNOSIS — Z98.891 HISTORY OF VBAC: ICD-10-CM

## 2025-02-21 DIAGNOSIS — M19.90 INFLAMMATORY ARTHRITIS: ICD-10-CM

## 2025-02-21 DIAGNOSIS — Z01.419 WOMEN'S ANNUAL ROUTINE GYNECOLOGICAL EXAMINATION: Primary | ICD-10-CM

## 2025-02-21 DIAGNOSIS — Z53.8 PAP SMEAR OF CERVIX NOT NEEDED: ICD-10-CM

## 2025-02-21 PROCEDURE — 99395 PREV VISIT EST AGE 18-39: CPT | Performed by: OBSTETRICS & GYNECOLOGY

## 2025-02-26 ENCOUNTER — PATIENT MESSAGE (OUTPATIENT)
Dept: FAMILY MEDICINE CLINIC | Age: 25
End: 2025-02-26

## 2025-02-26 DIAGNOSIS — F41.9 ANXIETY AND DEPRESSION: ICD-10-CM

## 2025-02-26 DIAGNOSIS — F32.A ANXIETY AND DEPRESSION: ICD-10-CM

## 2025-02-26 NOTE — TELEPHONE ENCOUNTER
Called Sharon and let her know that they do not make a 75 mg tablet  She will continue taking 1.5 of the 50 mg    Would like to use mail order in the future so this is pending  She will call [facility] when ready for refill

## 2025-03-04 PROBLEM — Z98.891 HISTORY OF VBAC: Status: ACTIVE | Noted: 2024-10-06

## 2025-03-04 ASSESSMENT — ENCOUNTER SYMPTOMS
NAUSEA: 0
ABDOMINAL PAIN: 0
DIARRHEA: 0
CONSTIPATION: 0
ABDOMINAL DISTENTION: 0
VOMITING: 0
SHORTNESS OF BREATH: 0

## 2025-03-04 NOTE — PROGRESS NOTES
of  section, low transverse        4. Inflammatory arthritis        5. Lactating mother        6. History of                 Plan   Options for contraception reviewed.   Appropriate pregnancy spacing discussed.  Referral for pelvic floor PT.            Cinthya Bunn DO

## 2025-07-13 ENCOUNTER — PATIENT MESSAGE (OUTPATIENT)
Dept: FAMILY MEDICINE CLINIC | Age: 25
End: 2025-07-13

## 2025-07-14 RX ORDER — ONDANSETRON 4 MG/1
4 TABLET, ORALLY DISINTEGRATING ORAL 3 TIMES DAILY PRN
Qty: 21 TABLET | Refills: 3 | Status: SHIPPED | OUTPATIENT
Start: 2025-07-14

## 2025-08-21 ENCOUNTER — PATIENT MESSAGE (OUTPATIENT)
Dept: FAMILY MEDICINE CLINIC | Age: 25
End: 2025-08-21

## 2025-08-21 DIAGNOSIS — F41.9 ANXIETY AND DEPRESSION: ICD-10-CM

## 2025-08-21 DIAGNOSIS — F32.A ANXIETY AND DEPRESSION: ICD-10-CM

## 2025-08-21 RX ORDER — SERTRALINE HYDROCHLORIDE 25 MG/1
25 TABLET, FILM COATED ORAL DAILY
Qty: 90 TABLET | Refills: 0 | Status: SHIPPED | OUTPATIENT
Start: 2025-08-21

## (undated) DEVICE — CHLORAPREP 26ML ORANGE

## (undated) DEVICE — SUTURE MCRYL SZ 3-0 L27IN ABSRB UD L60MM KS STR REV CUT Y523H

## (undated) DEVICE — S/USE RESUS KIT W/O MASK (10): Brand: FISHER & PAYKEL HEALTHCARE

## (undated) DEVICE — Device

## (undated) DEVICE — BLADE CLIPPER GEN PURP NS

## (undated) DEVICE — TRAY URIN CATH 16FR DRNGE BG STATLOK STBL DEV F SURSTP

## (undated) DEVICE — SUTURE ABSORBABLE BRAIDED 2-0 CT-1 27 IN UD VICRYL J259H

## (undated) DEVICE — DRESSING COMP IS W4XL10IN PD W2XL8IN CNTCT LAYR ADH

## (undated) DEVICE — 3M™ STERI-STRIP™ COMPOUND BENZOIN TINCTURE 40 BAGS/CARTON 4 CARTONS/CASE C1544: Brand: 3M™ STERI-STRIP™

## (undated) DEVICE — GARMENT COMPR L FOR 23IN CALF FLOTRN

## (undated) DEVICE — SUTURE VCRL 2-0 XLH 27IN ABSRB BRAID VLT J581G

## (undated) DEVICE — PAD,NON-ADHERENT,3X8,STERILE,LF,1/PK: Brand: MEDLINE

## (undated) DEVICE — GLOVE SURG SZ 65 THK91MIL LTX FREE SYN POLYISOPRENE

## (undated) DEVICE — SUTURE VCRL SZ 3-0 L36IN ABSRB UD L36MM CT-1 1/2 CIR J944H

## (undated) DEVICE — SOLUTION IV IRRIG POUR BRL 0.9% SODIUM CHL 2F7124

## (undated) DEVICE — SUTURE VCRL SZ 0 L36IN ABSRB UD L36MM CT-1 1/2 CIR J946H